# Patient Record
Sex: FEMALE | Race: BLACK OR AFRICAN AMERICAN | NOT HISPANIC OR LATINO | Employment: UNEMPLOYED | URBAN - METROPOLITAN AREA
[De-identification: names, ages, dates, MRNs, and addresses within clinical notes are randomized per-mention and may not be internally consistent; named-entity substitution may affect disease eponyms.]

---

## 2017-01-05 ENCOUNTER — CLINICAL SUPPORT (OUTPATIENT)
Dept: OTHER | Facility: CLINIC | Age: 52
End: 2017-01-05
Payer: COMMERCIAL

## 2017-01-05 VITALS
BODY MASS INDEX: 27.13 KG/M2 | WEIGHT: 179 LBS | SYSTOLIC BLOOD PRESSURE: 146 MMHG | DIASTOLIC BLOOD PRESSURE: 92 MMHG | HEIGHT: 68 IN

## 2017-01-05 DIAGNOSIS — Z00.8 HEALTH EXAMINATION IN POPULATION SURVEYS: Primary | ICD-10-CM

## 2017-01-05 LAB
GLUCOSE SERPL-MCNC: 115 MG/DL (ref 60–140)
POC CHOLESTEROL, HDL: 81 MG/DL (ref 40–?)
POC CHOLESTEROL, LDL: 98 MG/DL (ref ?–160)
POC CHOLESTEROL, TOTAL: 200 MG/DL (ref ?–240)
POC GLUCOSE FASTING: NORMAL MG/DL (ref 60–110)
POC TOTAL CHOLESTEROL / HDL RATIO: 2.5 (ref ?–6)
POC TRIGLYCERIDES: 104 MG/DL (ref ?–160)

## 2017-01-05 PROCEDURE — 82947 ASSAY GLUCOSE BLOOD QUANT: CPT | Mod: QW,S$GLB,, | Performed by: INTERNAL MEDICINE

## 2017-01-05 PROCEDURE — 80061 LIPID PANEL: CPT | Mod: QW,S$GLB,, | Performed by: INTERNAL MEDICINE

## 2017-01-05 PROCEDURE — 99401 PREV MED CNSL INDIV APPRX 15: CPT | Mod: S$GLB,,, | Performed by: INTERNAL MEDICINE

## 2017-01-05 NOTE — PROGRESS NOTES
Biometrics completed.     Results reviewed with a Registered Nurse; understanding of results and educational materials was verbalized.

## 2017-01-06 ENCOUNTER — TELEPHONE (OUTPATIENT)
Dept: OTHER | Facility: CLINIC | Age: 52
End: 2017-01-06

## 2017-01-09 ENCOUNTER — OFFICE VISIT (OUTPATIENT)
Dept: FAMILY MEDICINE | Facility: CLINIC | Age: 52
End: 2017-01-09
Payer: COMMERCIAL

## 2017-01-09 VITALS
HEART RATE: 85 BPM | SYSTOLIC BLOOD PRESSURE: 160 MMHG | BODY MASS INDEX: 27.45 KG/M2 | HEIGHT: 68 IN | OXYGEN SATURATION: 99 % | DIASTOLIC BLOOD PRESSURE: 100 MMHG | WEIGHT: 181.13 LBS | TEMPERATURE: 98 F

## 2017-01-09 DIAGNOSIS — Z12.11 COLON CANCER SCREENING: ICD-10-CM

## 2017-01-09 DIAGNOSIS — N89.8 VAGINAL DISCHARGE: ICD-10-CM

## 2017-01-09 DIAGNOSIS — Z12.4 PAP SMEAR FOR CERVICAL CANCER SCREENING: ICD-10-CM

## 2017-01-09 DIAGNOSIS — Z00.00 ANNUAL PHYSICAL EXAM: Primary | ICD-10-CM

## 2017-01-09 DIAGNOSIS — Z12.31 ENCOUNTER FOR SCREENING MAMMOGRAM FOR BREAST CANCER: ICD-10-CM

## 2017-01-09 PROCEDURE — 99999 PR PBB SHADOW E&M-EST. PATIENT-LVL IV: CPT | Mod: PBBFAC,,, | Performed by: FAMILY MEDICINE

## 2017-01-09 PROCEDURE — 88175 CYTOPATH C/V AUTO FLUID REDO: CPT

## 2017-01-09 PROCEDURE — 87480 CANDIDA DNA DIR PROBE: CPT

## 2017-01-09 PROCEDURE — 99386 PREV VISIT NEW AGE 40-64: CPT | Mod: S$GLB,,, | Performed by: FAMILY MEDICINE

## 2017-01-09 PROCEDURE — 87591 N.GONORRHOEAE DNA AMP PROB: CPT

## 2017-01-09 NOTE — MR AVS SNAPSHOT
[facility]  [address] [place] [address]  Phone: [phone]  Fax: [phone]                  Chris De La Paz   2017 2:30 PM   Office Visit    Description:  Female : 1965   Provider:  Ammy Jordan MD   Department:  [facility]           Reason for Visit     Annual Exam     Vaginal Discharge           Diagnoses this Visit        Comments    Encounter for screening mammogram for breast cancer    -  Primary     Annual physical exam         Colon cancer screening         Pap smear for cervical cancer screening         Vaginal discharge                To Do List           Future Appointments        Provider Department Dept Phone    2017 10:00 AM LAB, [place] [facility] [phone]    2017 5:45 PM [facility] MAMMO1 [facility] [phone]      Goals (5 Years of Data)     None      [facility]     [facility] Nurse Care Line -  Assistance  Registered nurses in the [facility] provide clinical advisement, health education, appointment booking, and other advisory services.  Call for this free service at [phone].             Medications           Message regarding Medications     Verify the changes and/or additions to your medication regime listed below are the same as discussed with your clinician today.  If any of these changes or additions are incorrect, please notify your healthcare provider.             Verify that the below list of medications is an accurate representation of the medications you are currently taking.  If none reported, the list may be blank. If incorrect, please contact your healthcare provider. Carry this list with you in case of emergency.                Clinical Reference Information           Vital Signs - Last Recorded  Most recent update: 2017  2:08 PM by Clovis Cedillo MA    BP Pulse Temp Ht Wt LMP    (!) 160/100 (BP Location: Left arm, Patient  "Position: Sitting, BP Method: Manual) 85 98 °F (36.7 °C) (Oral) 5' 8" (1.727 m) 82.2 kg (181 lb 1.7 oz) 09/13/2016    SpO2 BMI             99% 27.54 kg/m2         Blood Pressure          Most Recent Value    BP  (!)  160/100      Allergies as of 1/9/2017     No Known Allergies      Immunizations Administered on Date of Encounter - 1/9/2017     None      Orders Placed During Today's Visit      Normal Orders This Visit    C. trachomatis/N. gonorrhoeae by AMP DNA Cervix     Case request GI: COLONOSCOPY     Liquid-based pap smear, screening     Vaginosis Screen by DNA Probe     Future Labs/Procedures Expected by Expires    CBC auto differential  1/9/2017 3/10/2018    Comprehensive metabolic panel  1/9/2017 3/10/2018    Hemoglobin A1c  1/9/2017 3/10/2018    Lipid panel  1/9/2017 3/10/2018    Mammo Digital Screening Bilat with CAD  1/9/2017 3/12/2018    TSH  1/9/2017 3/10/2018      [facility] Sign-Up     Activating your [facility] account is as easy as 1-2-3!     1) Visit [email or web address], select Sign Up Now, enter this activation code and your date of birth, then select Next.  [email or web address]  Expires: 2/23/2017  2:41 PM      2) Create a username and password to use when you visit [facility] in the future and select a security question in case you lose your password and select Next.    3) Enter your e-mail address and click Sign Up!    Additional Information  If you have questions, please e-mail [email or web address] or call [phone] to talk to our [facility] staff. Remember, [facility] is NOT to be used for urgent needs. For medical emergencies, dial 911.         Smoking Cessation     If you would like to quit smoking:   You may be eligible for free services if you are a [place] resident and started smoking cigarettes before September 1, 1988.  Call the [employer]oking [employer]ess[employer] ([employer]) toll free at (565) 353-9197 or (859) 392-7507.   Call 5-762-QUIT-NOW if you do not meet the above criteria.            "

## 2017-01-10 ENCOUNTER — PATIENT MESSAGE (OUTPATIENT)
Dept: FAMILY MEDICINE | Facility: CLINIC | Age: 52
End: 2017-01-10

## 2017-01-10 LAB
CANDIDA RRNA VAG QL PROBE: POSITIVE
G VAGINALIS RRNA GENITAL QL PROBE: POSITIVE
T VAGINALIS RRNA GENITAL QL PROBE: NEGATIVE

## 2017-01-10 NOTE — PROGRESS NOTES
Routine Office Visit    Patient Name: Chris De La Paz    : 1965  MRN: [id number]    Subjective:  Chris is a 51 y.o. female who presents today for     1. Annual exam / establish care / new to me  2. Vaginal discharge - pt started a few days ago - pt is sexually active with same partner. No pain with sex.       Review of Systems   Constitutional: Negative for chills and fever.   HENT: Negative for congestion.    Eyes: Negative for blurred vision.   Respiratory: Negative for cough.    Cardiovascular: Negative for chest pain.   Gastrointestinal: Negative for abdominal pain, constipation, diarrhea, heartburn, nausea and vomiting.   Genitourinary: Negative for dysuria.   Musculoskeletal: Negative for myalgias.   Skin: Negative for itching and rash.   Neurological: Negative for dizziness and headaches.   Psychiatric/Behavioral: Negative for depression.       Active Problem List  There is no problem list on file for this patient.      Past Surgical History  Past Surgical History   Procedure Laterality Date    Tubal ligation         Family History  Family History   Problem Relation Age of Onset    Hypertension Mother     Hypotension Father        Social History  Social History     Social History    Marital status: Single     Spouse name: N/A    Number of children: N/A    Years of education: N/A     Occupational History    Not on file.     Social History Main Topics    Smoking status: Current Every Day Smoker     Packs/day: 0.50     Types: Cigarettes    Smokeless tobacco: Never Used    Alcohol use Yes      Comment: socially    Drug use: No    Sexual activity: Yes     Partners: Male     Birth control/ protection: None     Other Topics Concern    Not on file     Social History Narrative       Medications and Allergies  Reviewed and updated.       Physical Exam  Visit Vitals    BP (!) 160/100 (BP Location: Left arm, Patient Position: Sitting, BP Method: Manual)    Pulse 85    Temp 98 °F (36.7 °C)  "(Oral)    Ht 5' 8" (1.727 m)    Wt 82.2 kg (181 lb 1.7 oz)    LMP 09/13/2016    SpO2 99%    BMI 27.54 kg/m2     Physical Exam   Constitutional: She is oriented to person, place, and time. She appears well-developed and well-nourished.   HENT:   Head: Normocephalic and atraumatic.   Eyes: Conjunctivae and EOM are normal. Pupils are equal, round, and reactive to light.   Neck: Normal range of motion. Neck supple.   Cardiovascular: Normal rate, regular rhythm and normal heart sounds.  Exam reveals no gallop and no friction rub.    No murmur heard.  Pulmonary/Chest: Breath sounds normal. No respiratory distress. Breasts are symmetrical. There is no breast swelling.   Abdominal: Soft. Bowel sounds are normal. She exhibits no distension. There is no tenderness.   Genitourinary: Vagina normal and uterus normal. No breast tenderness, discharge or bleeding. Cervix exhibits friability. Right adnexum displays no mass. Left adnexum displays no mass.   Musculoskeletal: Normal range of motion.   Lymphadenopathy:     She has no cervical adenopathy.   Neurological: She is alert and oriented to person, place, and time.   Skin: Skin is warm.   Psychiatric: She has a normal mood and affect.         Assessment/Plan:  Chirs De La Paz is a 51 y.o. female who presents today for :    Annual physical exam  -     Lipid panel; Future; Expected date: 1/9/17  -     TSH; Future; Expected date: 1/9/17  -     CBC auto differential; Future; Expected date: 1/9/17  -     Comprehensive metabolic panel; Future; Expected date: 1/9/17  -     Hemoglobin A1c; Future; Expected date: 1/9/17    Encounter for screening mammogram for breast cancer  -     Mammo Digital Screening Bilat with CAD; Future; Expected date: 1/9/17    Colon cancer screening  -     Case request GI: COLONOSCOPY    Pap smear for cervical cancer screening  -     Liquid-based pap smear, screening    Vaginal discharge  -     Vaginosis Screen by DNA Probe  -     C. trachomatis/N. " gonorrhoeae by AMP DNA Cervix    Elevated blood pressure  Pt had elevated blood pressure. Reviewed previous blood pressures in chart - wnl   The patient is asked to make an attempt to improve diet and exercise patterns to aid in medical management of this problem.      Return in about 3 months (around 4/9/2017), or if symptoms worsen or fail to improve.

## 2017-01-11 LAB
C TRACH DNA SPEC QL NAA+PROBE: NEGATIVE
N GONORRHOEA DNA SPEC QL NAA+PROBE: NEGATIVE

## 2017-01-13 RX ORDER — METRONIDAZOLE 500 MG/1
500 TABLET ORAL EVERY 12 HOURS
Qty: 14 TABLET | Refills: 0 | Status: SHIPPED | OUTPATIENT
Start: 2017-01-13 | End: 2017-01-20

## 2017-01-13 RX ORDER — FLUCONAZOLE 150 MG/1
150 TABLET ORAL ONCE
Qty: 1 TABLET | Refills: 0 | Status: SHIPPED | OUTPATIENT
Start: 2017-01-13 | End: 2017-01-13

## 2017-01-22 ENCOUNTER — PATIENT MESSAGE (OUTPATIENT)
Dept: FAMILY MEDICINE | Facility: CLINIC | Age: 52
End: 2017-01-22

## 2017-01-22 ENCOUNTER — TELEPHONE (OUTPATIENT)
Dept: FAMILY MEDICINE | Facility: CLINIC | Age: 52
End: 2017-01-22

## 2017-01-22 NOTE — TELEPHONE ENCOUNTER
----- Message from Deandrajaneth Nevarez sent at 1/12/2017  3:59 PM CST -----  Contact: self  Pt want to know if her medication is being call into the pharmacy. She came for an ov and has some test done and stating that there is supposed to be medication send to the pharmacy for her. Please contact the pt at [phone]. Thanks!

## 2017-05-18 ENCOUNTER — PATIENT MESSAGE (OUTPATIENT)
Dept: FAMILY MEDICINE | Facility: CLINIC | Age: 52
End: 2017-05-18

## 2017-05-18 ENCOUNTER — OFFICE VISIT (OUTPATIENT)
Dept: FAMILY MEDICINE | Facility: CLINIC | Age: 52
End: 2017-05-18
Payer: COMMERCIAL

## 2017-05-18 ENCOUNTER — LAB VISIT (OUTPATIENT)
Dept: LAB | Facility: HOSPITAL | Age: 52
End: 2017-05-18
Attending: FAMILY MEDICINE
Payer: COMMERCIAL

## 2017-05-18 VITALS
HEART RATE: 110 BPM | BODY MASS INDEX: 26.12 KG/M2 | WEIGHT: 172.31 LBS | TEMPERATURE: 99 F | OXYGEN SATURATION: 97 % | HEIGHT: 68 IN | SYSTOLIC BLOOD PRESSURE: 160 MMHG | DIASTOLIC BLOOD PRESSURE: 98 MMHG

## 2017-05-18 DIAGNOSIS — M54.50 ACUTE BILATERAL LOW BACK PAIN WITHOUT SCIATICA: Primary | ICD-10-CM

## 2017-05-18 DIAGNOSIS — Z13.220 SCREENING CHOLESTEROL LEVEL: ICD-10-CM

## 2017-05-18 DIAGNOSIS — Z12.11 COLON CANCER SCREENING: ICD-10-CM

## 2017-05-18 DIAGNOSIS — R03.0 ELEVATED BLOOD PRESSURE READING WITHOUT DIAGNOSIS OF HYPERTENSION: ICD-10-CM

## 2017-05-18 DIAGNOSIS — Z23 NEED FOR TDAP VACCINATION: ICD-10-CM

## 2017-05-18 DIAGNOSIS — Z00.00 ANNUAL PHYSICAL EXAM: ICD-10-CM

## 2017-05-18 DIAGNOSIS — Z12.31 OTHER SCREENING MAMMOGRAM: ICD-10-CM

## 2017-05-18 DIAGNOSIS — Z23 NEED FOR PNEUMOCOCCAL VACCINE: ICD-10-CM

## 2017-05-18 LAB
ALBUMIN SERPL BCP-MCNC: 3.3 G/DL
ALP SERPL-CCNC: 96 U/L
ALT SERPL W/O P-5'-P-CCNC: 23 U/L
ANION GAP SERPL CALC-SCNC: 10 MMOL/L
AST SERPL-CCNC: 27 U/L
BASOPHILS # BLD AUTO: 0.01 K/UL
BASOPHILS NFR BLD: 0.1 %
BILIRUB SERPL-MCNC: 0.4 MG/DL
BUN SERPL-MCNC: 12 MG/DL
CALCIUM SERPL-MCNC: 9.7 MG/DL
CHLORIDE SERPL-SCNC: 104 MMOL/L
CHOLEST/HDLC SERPL: 2.1 {RATIO}
CO2 SERPL-SCNC: 24 MMOL/L
CREAT SERPL-MCNC: 0.9 MG/DL
DIFFERENTIAL METHOD: ABNORMAL
EOSINOPHIL # BLD AUTO: 0 K/UL
EOSINOPHIL NFR BLD: 0 %
ERYTHROCYTE [DISTWIDTH] IN BLOOD BY AUTOMATED COUNT: 20.3 %
EST. GFR  (AFRICAN AMERICAN): >60 ML/MIN/1.73 M^2
EST. GFR  (NON AFRICAN AMERICAN): >60 ML/MIN/1.73 M^2
ESTIMATED AVG GLUCOSE: 117 MG/DL
GLUCOSE SERPL-MCNC: 87 MG/DL
HBA1C MFR BLD HPLC: 5.7 %
HCT VFR BLD AUTO: 29.1 %
HDL/CHOLESTEROL RATIO: 47.7 %
HDLC SERPL-MCNC: 174 MG/DL
HDLC SERPL-MCNC: 83 MG/DL
HGB BLD-MCNC: 8.7 G/DL
LDLC SERPL CALC-MCNC: 79 MG/DL
LYMPHOCYTES # BLD AUTO: 1.3 K/UL
LYMPHOCYTES NFR BLD: 17.4 %
MCH RBC QN AUTO: 21.7 PG
MCHC RBC AUTO-ENTMCNC: 29.9 %
MCV RBC AUTO: 73 FL
MONOCYTES # BLD AUTO: 0.7 K/UL
MONOCYTES NFR BLD: 9.6 %
NEUTROPHILS # BLD AUTO: 5.6 K/UL
NEUTROPHILS NFR BLD: 72.8 %
NONHDLC SERPL-MCNC: 91 MG/DL
PLATELET # BLD AUTO: 359 K/UL
PMV BLD AUTO: 9.3 FL
POTASSIUM SERPL-SCNC: 3.8 MMOL/L
PROT SERPL-MCNC: 7.6 G/DL
RBC # BLD AUTO: 4.01 M/UL
SODIUM SERPL-SCNC: 138 MMOL/L
TRIGL SERPL-MCNC: 60 MG/DL
TSH SERPL DL<=0.005 MIU/L-ACNC: 1.15 UIU/ML
WBC # BLD AUTO: 7.69 K/UL

## 2017-05-18 PROCEDURE — 80053 COMPREHEN METABOLIC PANEL: CPT

## 2017-05-18 PROCEDURE — 85025 COMPLETE CBC W/AUTO DIFF WBC: CPT

## 2017-05-18 PROCEDURE — 83036 HEMOGLOBIN GLYCOSYLATED A1C: CPT

## 2017-05-18 PROCEDURE — 99999 PR PBB SHADOW E&M-EST. PATIENT-LVL III: CPT | Mod: PBBFAC,,, | Performed by: NURSE PRACTITIONER

## 2017-05-18 PROCEDURE — 90732 PPSV23 VACC 2 YRS+ SUBQ/IM: CPT | Mod: S$GLB,,, | Performed by: NURSE PRACTITIONER

## 2017-05-18 PROCEDURE — 36415 COLL VENOUS BLD VENIPUNCTURE: CPT | Mod: PO

## 2017-05-18 PROCEDURE — 99214 OFFICE O/P EST MOD 30 MIN: CPT | Mod: 25,S$GLB,, | Performed by: NURSE PRACTITIONER

## 2017-05-18 PROCEDURE — 84443 ASSAY THYROID STIM HORMONE: CPT

## 2017-05-18 PROCEDURE — 80061 LIPID PANEL: CPT

## 2017-05-18 PROCEDURE — 1160F RVW MEDS BY RX/DR IN RCRD: CPT | Mod: S$GLB,,, | Performed by: NURSE PRACTITIONER

## 2017-05-18 PROCEDURE — 96372 THER/PROPH/DIAG INJ SC/IM: CPT | Mod: S$GLB,,, | Performed by: NURSE PRACTITIONER

## 2017-05-18 PROCEDURE — 90471 IMMUNIZATION ADMIN: CPT | Mod: S$GLB,,, | Performed by: NURSE PRACTITIONER

## 2017-05-18 PROCEDURE — 90715 TDAP VACCINE 7 YRS/> IM: CPT | Mod: S$GLB,,, | Performed by: NURSE PRACTITIONER

## 2017-05-18 PROCEDURE — 90472 IMMUNIZATION ADMIN EACH ADD: CPT | Mod: S$GLB,,, | Performed by: NURSE PRACTITIONER

## 2017-05-18 RX ORDER — NAPROXEN 500 MG/1
500 TABLET ORAL 2 TIMES DAILY PRN
Qty: 30 TABLET | Refills: 0 | Status: SHIPPED | OUTPATIENT
Start: 2017-05-18 | End: 2017-11-05

## 2017-05-18 RX ORDER — DEXAMETHASONE SODIUM PHOSPHATE 4 MG/ML
8 INJECTION, SOLUTION INTRA-ARTICULAR; INTRALESIONAL; INTRAMUSCULAR; INTRAVENOUS; SOFT TISSUE
Status: COMPLETED | OUTPATIENT
Start: 2017-05-18 | End: 2017-05-18

## 2017-05-18 RX ORDER — METHOCARBAMOL 500 MG/1
500 TABLET, FILM COATED ORAL EVERY 12 HOURS PRN
Qty: 30 TABLET | Refills: 0 | Status: SHIPPED | OUTPATIENT
Start: 2017-05-18 | End: 2017-05-21

## 2017-05-18 RX ADMIN — DEXAMETHASONE SODIUM PHOSPHATE 8 MG: 4 INJECTION, SOLUTION INTRA-ARTICULAR; INTRALESIONAL; INTRAMUSCULAR; INTRAVENOUS; SOFT TISSUE at 08:05

## 2017-05-18 NOTE — PATIENT INSTRUCTIONS
Self-Care for Low Back Pain    Most people have low back pain now and then. In many cases, it isnt serious and self-care can help. Sometimes low back pain can be a sign of a bigger problem. Call your healthcare provider if your pain returns often or gets worse over time. For the long-term care of your back, get regular exercise, lose any excess weight and learn good posture.  Take a short rest  Lying down during the day may be beneficial for short periods of time if severe pain increases with sitting or standing. Long-term bed rest could be detrimental.  Reduce pain and swelling  Cold reduces swelling. Both cold and heat can reduce pain. Protect your skin by placing a towel between your body and the ice or heat source.  · For the first few days, apply an ice pack for 15 to 20 minutes .  · After the first few days, try heat for 15 minutes at a time to ease pain. Never sleep on a heating pad.  · Over-the-counter medicine can help control pain and swelling. Try aspirin or ibuprofen.  Exercise  Exercise can help your back heal. It also helps your back get stronger and more flexible, preventing any reinjury. Ask your healthcare provider about specific exercises for your back.  Use good posture to avoid reinjury  · When moving, bend at the hips and knees. Dont bend at the waist or twist around.  · When lifting, keep the object close to your body. Dont try to lift more than you can handle.  · When sitting, keep your lower back supported. Use a rolled-up towel as needed.  Seek immediate medical care if:  · Youre unable to stand or walk.  · You have a temperature over 100.4°F (38.0°C)  · You have frequent, painful, or bloody urination.  · You have severe abdominal pain.  · You have a sharp, stabbing pain.  · Your pain is constant.  · You have pain or numbness in your leg.  · You feel pain in a new area of your back.  · You notice that the pain isnt decreasing after more than a week.   Date Last Reviewed: 9/29/2015  ©  8307-6362 [employer]. [address] [place] [address], [place] [address]. All rights reserved. This information is not intended as a substitute for professional medical care. Always follow your healthcare professional's instructions.        Back Pain (Acute or Chronic)    Back pain is one of the most common problems. The good news is that most people feel better in 1 to 2 weeks, and most of the rest in 1 to 2 months. Most people can remain active.  People experience and describe pain differently; not everyone is the same.  · The pain can be sharp, stabbing, shooting, aching, cramping or burning.  · Movement, standing, bending, lifting, sitting, or walking may worsen pain.  · It can be localized to one spot or area, or it can be more generalized.  · It can spread or radiate upwards, to the front, or go down your arms or legs (sciatica).  · It can cause muscle spasm.  Most of the time, mechanical problems with the muscles or spine cause the pain. Mechanical problems are usually caused by an injury to the muscles or ligaments. While illness can cause back pain, it is usually not caused by a serious illness. Mechanical problems include:   · Physical activity such as sports, exercise, work, or normal activity  · Overexertion, lifting, pushing, pulling incorrectly or too aggressively  · Sudden twisting, bending, or stretching from an accident, or accidental movement  · Poor posture  · Stretching or moving wrong, without noticing pain at the time  · Poor coordination, lack of regular exercise (check with your doctor about this)  · Spinal disc disease or arthritis  · Stress  Pain can also be related to pregnancy, or illness like appendicitis, bladder or kidney infections, pelvic infections, and many other things.  Acute back pain usually gets better in 1 to 2 weeks. Back pain related to disk disease, arthritis in the spinal joints or spinal stenosis (narrowing of the spinal canal) can become chronic and last for  months or years.  Unless you had a physical injury (for example, a car accident or fall) X-rays are usually not needed for the initial evaluation of back pain. If pain continues and does not respond to medical treatment, X-rays and other tests may be needed.  Home care  Try these home care recommendations:  · When in bed, try to find a position of comfort. A firm mattress is best. Try lying flat on your back with pillows under your knees. You can also try lying on your side with your knees bent up towards your chest and a pillow between your knees.  · At first, do not try to stretch out the sore spots. If there is a strain, it is not like the good soreness you get after exercising without an injury. In this case, stretching may make it worse.  · Avoid prolong sitting, long car rides, or travel. This puts more stress on the lower back than standing or walking.  · During the first 24 to 72 hours after an acute injury or flare up of chronic back pain, apply an ice pack to the painful area for 20 minutes and then remove it for 20 minutes. Do this over a period of 60 to 90 minutes or several times a day. This will reduce swelling and pain. Wrap the ice pack in a thin towel or plastic to protect your skin.  · You can start with ice, then switch to heat. Heat (hot shower, hot bath, or heating pad) reduces pain and works well for muscle spasms. Heat can be applied to the painful area for 20 minutes then remove it for 20 minutes. Do this over a period of 60 to 90 minutes or several times a day. Do not sleep on a heating pad. It can lead to skin burns or tissue damage.  · You can alternate ice and heat therapy. Talk with your doctor about the best treatment for your back pain.  · Therapeutic massage can help relax the back muscles without stretching them.  · Be aware of safe lifting methods and do not lift anything without stretching first.  Medicines  Talk to your doctor before using medicine, especially if you have other  medical problems or are taking other medicines.  · You may use over-the-counter medicine as directed on the bottle to control pain, unless another pain medicine was prescribed. If you have chronic conditions like diabetes, liver or kidney disease, stomach ulcers, or gastrointestinal bleeding, or are taking blood thinners, talk to your doctor before taking any medicine.  · Be careful if you are given a prescription medicines, narcotics, or medicine for muscle spasms. They can cause drowsiness, affect your coordination, reflexes, and judgement. Do not drive or operate heavy machinery.  Follow-up care  Follow up with your healthcare provider, or as advised.   A radiologist will review any X-rays that were taken. Your provide will notify you of any new findings that may affect your care.  Call 911  Call emergency services if any of the following occur:  · Trouble breathing  · Confusion  · Very drowsy or trouble awakening  · Fainting or loss of consciousness  · Rapid or very slow heart rate  · Loss of bowel or bladder control  When to seek medical advice  Call your healthcare provider right away if any of these occur:   · Pain becomes worse or spreads to your legs  · Weakness or numbness in one or both legs  · Numbness in the groin or genital area  Date Last Reviewed: 7/1/2016  © 1234-3546 [employer]. [address] [place] [address], [place] [address]. All rights reserved. This information is not intended as a substitute for professional medical care. Always follow your healthcare professional's instructions.        Back Spasm (No Trauma)    Spasm of the back muscles can occur after a sudden forceful twisting or bending force (such as in a car accident), after a simple awkward movement, or after lifting something heavy with poor body positioning. In any case, muscle spasm adds to the pain. Sleeping in an awkward position or on a poor quality mattress can also cause this. Some people respond to emotional stress by  tensing the muscles of their back.  Pain that continues may need further evaluation or other types of treatment such as physical therapy.  You don't always need X-rays for the initial evaluation of back pain, unless you had a physical injury such as from a car accident or fall. If your pain continues and doesn't respond to medical treatment, X-rays and other tests may then be done.   Home care  · As soon as possible, start sitting or walking again to avoid problems from prolonged bed rest (muscle weakness, worsening back stiffness and pain, blood clots in the legs).  · When in bed, try to find a position of comfort. A firm mattress is best. Try lying flat on your back with pillows under your knees. You can also try lying on your side with your knees bent up toward your chest and a pillow between your knees.  · Avoid prolonged sitting, long car rides, or travel. This puts more stress on the lower back than standing or walking.   · During the first 24 to 72 hours after an injury or flare-up, apply an ice pack to the painful area for 20 minutes, then remove it for 20 minutes. Do this over a period of 60 to 90 minutes or several times a day. This will reduce swelling and pain. Always wrap ice packs in a thin towel.  · You can start with ice, then switch to heat. Heat (hot shower, hot bath, or heating pad) reduces pain, and works well for muscle spasms. Apply heat to the painful area for 20 minutes, then remove it for 20 minutes. Do this over a period of 60 to 90 minutes or several times a day. Do not sleep on a heating pad as it can burn or damage skin.  · Alternate ice and heat therapies.  · Be aware of safe lifting methods and do not lift anything over 15 pounds until all the pain is gone.  Gentle stretching will help your back heal faster. Do this simple routine 2 to 3 times a day until your back is feeling better.  · Lie on your back with your knees bent and both feet on the ground  · Slowly raise your left knee to  your chest as you flatten your lower back against the floor. Hold for 20 to 30 seconds.  · Relax and repeat the exercise with your right knee.  · Do 2 to 3 of these exercises for each leg.  · Repeat, hugging both knees to your chest at the same time.  · Do not bounce, but use a gentle pull.  Medicines  Talk to your doctor before using medicine, especially if you have other medical problems or are taking other medicines.  You may use acetaminophen or ibuprofen to control pain, unless your healthcare provider prescribed another pain medicine. If you have a chronic condition such as diabetes, liver or kidney disease, stomach ulcer, or gastrointestinal bleeding, or are taking blood thinners, talk with your healthcare provider before taking any medicines.  Be careful if you are given prescription pain medicine, narcotics, or medicine for muscle spasm. They can cause drowsiness, affect your coordination, reflexes, or judgment. Do not drive or operate heavy machinery when taking these medicines. Take pain medicine only as prescribed by your healthcare provider.  Follow-up care  Follow up with your doctor, or as advised. Physical therapy or further tests may be needed.  If X-rays were taken, they may be reviewed by a radiologist. You will be notified of any new findings that may affect your care.  Call 911  Seek emergency medical care if any of these occur:  · Trouble breathing  · Confusion  · Drowsiness or trouble awakening  · Fainting or loss of consciousness  · Rapid or very slow heart rate  · Loss of bowel or bladder control  When to seek medical advice  Call your healthcare provider right away if any of these occur:  · Pain becomes worse or spreads to your legs  · Weakness or numbness in one or both legs  · Numbness in the groin or genital area  · Unexplained fever over 100.4ºF (38.0ºC)  · Burning or pain when passing urine  Date Last Reviewed: 6/1/2016  © 5935-0204 [employer]. [address] [facility],  [place] [address]. All rights reserved. This information is not intended as a substitute for professional medical care. Always follow your healthcare professional's instructions.      Back Exercise to Keep Fit for Low Back Pain  To help in your recovery and to prevent further back problems, keep your back, abdominal muscles and legs strong. Walk daily as soon as you can. Gradually add other physical activities such as swimming and biking, which can help improve lower back strength. Begin as soon as you can do them comfortably. Do not do any exercises that make your pain a lot worse. The following are some back exercises that can help relieve low back pain.     Pelvic tilt   Repeat 5-10 times, twice per day.  Lie flat on your back (or stand with your back to a wall), knees bent, feet flat on the floor, body relaxed. Tighten your abdominal and buttock muscles, and tilt your pelvis. The curve of the small of your back should flatten towards the floor (or wall). Hold 10 seconds and then relax.       Knee raise     Repeat 5-10 times, twice per day.    Lie flat on your back, knees bent. Bring one knee slowly to your chest. Hug your knee gently. Then lower your leg toward the floor, keeping your knee bent. Do not straighten your legs. Repeat exercise with your other leg.              Partial press-up     Lie face down on a soft, firm surface. Do not turn your head to either side. Rest your arms bent at the elbows alongside your body. Relax for a few minutes. Then raise your upper body enough to lean on your elbows. Relax your lower back and legs as much as possible. Hold this position for 30 seconds at first. Gradually work up to five minutes. Or try slow press-ups. Hold each for five seconds, and repeat five to six times.              Copyright © 2012 by [employer]   Evangelina MACIEL, Abel LA, Gina JARRELL, Joby B, Shiva R, Layo B, Cody K, Mitchell C, Ron B, Malik S, Vasquez RHODES,  Monique CANTRELL [employer]. Adult Acute and Subacute Low Back Pain. Updated November 2012.     Back Exercises: Lower Back Rotation    To start, lie on your back with your knees bent and feet flat on the floor. Dont press your neck or lower back to the floor. Breathe deeply. You should feel comfortable and relaxed in this position.  · Drop both knees to one side. Turn your head to the other side. Keep your shoulders flat on the floor.  · Do not push through pain.  · Hold for 20 seconds.  · Slowly switch sides.  · Repeat 2 to 5 times.  Date Last Reviewed: 10/11/2015  © 7311-9185 [employer]. [address] [place] [address]. All rights reserved. This information is not intended as a substitute for professional medical care. Always follow your healthcare professional's instructions.        Back Exercises: Lower Back Stretch                        To start, sit in a chair with your feet flat on the floor. Shift your weight slightly forward. Relax, and keep your ears, shoulders, and hips aligned.  · Sit with your feet well apart.  · Bend forward and touch the floor with the backs of your hands. Relax and let your body drop.  · Hold for 20 seconds. Return to starting position.  · Repeat 2 times.   Date Last Reviewed: 8/16/2015  © 3365-2810 [employer]. [address] [place] [address]. All rights reserved. This information is not intended as a substitute for professional medical care. Always follow your healthcare professional's instructions.

## 2017-05-18 NOTE — LETTER
May 18, 2017      Chris De La Paz   [address]   [place] [address]             [facility]  [address] [facility] [facility]  [place] [address]  Phone: [phone]  Fax: [phone] Chris De La Paz    Was treated here on 05/18/2017    May Return to work/school on 05/22/2017    No Restrictions        REGINE BernardC

## 2017-05-18 NOTE — MR AVS SNAPSHOT
[facility]  [address]  [place] [address]  Phone: [phone]  Fax: [phone]                  Chris De La Paz   2017 8:00 AM   Office Visit    Description:  Female : 1965   Provider:  LISSA Connelly   Department:  [facility]           Reason for Visit     Low-back Pain     Letter for School/Work           Diagnoses this Visit        Comments    Acute bilateral low back pain without sciatica    -  Primary     Other screening mammogram         Colon cancer screening         Screening cholesterol level                To Do List           Goals (5 Years of Data)     None      Follow-Up and Disposition     Return if symptoms worsen or fail to improve.       These Medications        Disp Refills Start End    methocarbamol (ROBAXIN) 500 MG Tab 30 tablet 0 2017    Take 1 tablet (500 mg total) by mouth every 12 (twelve) hours as needed. - Oral    Pharmacy: [facility] [address] [place] [address] [address] [facility] Ph #: [phone]       naproxen (NAPROSYN) 500 MG tablet 30 tablet 0 2017     Take 1 tablet (500 mg total) by mouth 2 (two) times daily as needed. - Oral    Pharmacy: [facility] - [place] - [address] [facility] Ph #: [phone]         [facility]     [facility] On Call Nurse Care Line - 24/7 Assistance  Unless otherwise directed by your provider, please contact [facility], our nurse care line that is available for 24/7 assistance.     Registered nurses in the [facility] provide: appointment scheduling, clinical advisement, health education, and other advisory services.  Call: [phone] (toll free)               Medications           Message regarding Medications     Verify the changes and/or additions to your medication regime listed below are the same as discussed with your clinician today.  If any of these changes or additions are incorrect, please  "notify your healthcare provider.        START taking these NEW medications        Refills    methocarbamol (ROBAXIN) 500 MG Tab 0    Sig: Take 1 tablet (500 mg total) by mouth every 12 (twelve) hours as needed.    Class: Normal    Route: Oral    naproxen (NAPROSYN) 500 MG tablet 0    Sig: Take 1 tablet (500 mg total) by mouth 2 (two) times daily as needed.    Class: Normal    Route: Oral      These medications were administered today        Dose Freq    dexamethasone injection 8 mg 8 mg Clinic/HOD 1 time    Sig: Inject 2 mLs (8 mg total) into the muscle one time.    Class: Normal    Route: Intramuscular           Verify that the below list of medications is an accurate representation of the medications you are currently taking.  If none reported, the list may be blank. If incorrect, please contact your healthcare provider. Carry this list with you in case of emergency.           Current Medications     methocarbamol (ROBAXIN) 500 MG Tab Take 1 tablet (500 mg total) by mouth every 12 (twelve) hours as needed.    naproxen (NAPROSYN) 500 MG tablet Take 1 tablet (500 mg total) by mouth 2 (two) times daily as needed.           Clinical Reference Information           Your Vitals Were     BP Pulse Temp Height Weight Last Period    160/98 (BP Location: Right arm, Patient Position: Sitting, BP Method: Manual) 110 98.5 °F (36.9 °C) (Oral) 5' 8" (1.727 m) 78.1 kg (172 lb 4.6 oz) 09/13/2016    SpO2 BMI             97% 26.2 kg/m2         Blood Pressure          Most Recent Value    BP  (!)  160/98      Allergies as of 5/18/2017     No Known Allergies      Immunizations Administered on Date of Encounter - 5/18/2017     Name Date Dose VIS Date Route    Pneumococcal Polysaccharide - 23 Valent  Incomplete 0.5 mL 4/24/2015 Intramuscular      Orders Placed During Today's Visit      Normal Orders This Visit    Case request GI: COLONOSCOPY     Pneumococcal Polysaccharide Vaccine (23 Valent) (SQ/IM)     Future Labs/Procedures Expected " by Expires    Mammo Digital Screening Bilat with CAD  5/18/2017 7/16/2018      Instructions      Self-Care for Low Back Pain    Most people have low back pain now and then. In many cases, it isnt serious and self-care can help. Sometimes low back pain can be a sign of a bigger problem. Call your healthcare provider if your pain returns often or gets worse over time. For the long-term care of your back, get regular exercise, lose any excess weight and learn good posture.  Take a short rest  Lying down during the day may be beneficial for short periods of time if severe pain increases with sitting or standing. Long-term bed rest could be detrimental.  Reduce pain and swelling  Cold reduces swelling. Both cold and heat can reduce pain. Protect your skin by placing a towel between your body and the ice or heat source.  · For the first few days, apply an ice pack for 15 to 20 minutes .  · After the first few days, try heat for 15 minutes at a time to ease pain. Never sleep on a heating pad.  · Over-the-counter medicine can help control pain and swelling. Try aspirin or ibuprofen.  Exercise  Exercise can help your back heal. It also helps your back get stronger and more flexible, preventing any reinjury. Ask your healthcare provider about specific exercises for your back.  Use good posture to avoid reinjury  · When moving, bend at the hips and knees. Dont bend at the waist or twist around.  · When lifting, keep the object close to your body. Dont try to lift more than you can handle.  · When sitting, keep your lower back supported. Use a rolled-up towel as needed.  Seek immediate medical care if:  · Youre unable to stand or walk.  · You have a temperature over 100.4°F (38.0°C)  · You have frequent, painful, or bloody urination.  · You have severe abdominal pain.  · You have a sharp, stabbing pain.  · Your pain is constant.  · You have pain or numbness in your leg.  · You feel pain in a new area of your back.  · You  notice that the pain isnt decreasing after more than a week.   Date Last Reviewed: 9/29/2015  © 0170-8323 [employer]. [address] [place] [address], [place] [address]. All rights reserved. This information is not intended as a substitute for professional medical care. Always follow your healthcare professional's instructions.        Back Pain (Acute or Chronic)    Back pain is one of the most common problems. The good news is that most people feel better in 1 to 2 weeks, and most of the rest in 1 to 2 months. Most people can remain active.  People experience and describe pain differently; not everyone is the same.  · The pain can be sharp, stabbing, shooting, aching, cramping or burning.  · Movement, standing, bending, lifting, sitting, or walking may worsen pain.  · It can be localized to one spot or area, or it can be more generalized.  · It can spread or radiate upwards, to the front, or go down your arms or legs (sciatica).  · It can cause muscle spasm.  Most of the time, mechanical problems with the muscles or spine cause the pain. Mechanical problems are usually caused by an injury to the muscles or ligaments. While illness can cause back pain, it is usually not caused by a serious illness. Mechanical problems include:   · Physical activity such as sports, exercise, work, or normal activity  · Overexertion, lifting, pushing, pulling incorrectly or too aggressively  · Sudden twisting, bending, or stretching from an accident, or accidental movement  · Poor posture  · Stretching or moving wrong, without noticing pain at the time  · Poor coordination, lack of regular exercise (check with your doctor about this)  · Spinal disc disease or arthritis  · Stress  Pain can also be related to pregnancy, or illness like appendicitis, bladder or kidney infections, pelvic infections, and many other things.  Acute back pain usually gets better in 1 to 2 weeks. Back pain related to disk disease, arthritis in the  spinal joints or spinal stenosis (narrowing of the spinal canal) can become chronic and last for months or years.  Unless you had a physical injury (for example, a car accident or fall) X-rays are usually not needed for the initial evaluation of back pain. If pain continues and does not respond to medical treatment, X-rays and other tests may be needed.  Home care  Try these home care recommendations:  · When in bed, try to find a position of comfort. A firm mattress is best. Try lying flat on your back with pillows under your knees. You can also try lying on your side with your knees bent up towards your chest and a pillow between your knees.  · At first, do not try to stretch out the sore spots. If there is a strain, it is not like the good soreness you get after exercising without an injury. In this case, stretching may make it worse.  · Avoid prolong sitting, long car rides, or travel. This puts more stress on the lower back than standing or walking.  · During the first 24 to 72 hours after an acute injury or flare up of chronic back pain, apply an ice pack to the painful area for 20 minutes and then remove it for 20 minutes. Do this over a period of 60 to 90 minutes or several times a day. This will reduce swelling and pain. Wrap the ice pack in a thin towel or plastic to protect your skin.  · You can start with ice, then switch to heat. Heat (hot shower, hot bath, or heating pad) reduces pain and works well for muscle spasms. Heat can be applied to the painful area for 20 minutes then remove it for 20 minutes. Do this over a period of 60 to 90 minutes or several times a day. Do not sleep on a heating pad. It can lead to skin burns or tissue damage.  · You can alternate ice and heat therapy. Talk with your doctor about the best treatment for your back pain.  · Therapeutic massage can help relax the back muscles without stretching them.  · Be aware of safe lifting methods and do not lift anything without  stretching first.  Medicines  Talk to your doctor before using medicine, especially if you have other medical problems or are taking other medicines.  · You may use over-the-counter medicine as directed on the bottle to control pain, unless another pain medicine was prescribed. If you have chronic conditions like diabetes, liver or kidney disease, stomach ulcers, or gastrointestinal bleeding, or are taking blood thinners, talk to your doctor before taking any medicine.  · Be careful if you are given a prescription medicines, narcotics, or medicine for muscle spasms. They can cause drowsiness, affect your coordination, reflexes, and judgement. Do not drive or operate heavy machinery.  Follow-up care  Follow up with your healthcare provider, or as advised.   A radiologist will review any X-rays that were taken. Your provide will notify you of any new findings that may affect your care.  Call 911  Call emergency services if any of the following occur:  · Trouble breathing  · Confusion  · Very drowsy or trouble awakening  · Fainting or loss of consciousness  · Rapid or very slow heart rate  · Loss of bowel or bladder control  When to seek medical advice  Call your healthcare provider right away if any of these occur:   · Pain becomes worse or spreads to your legs  · Weakness or numbness in one or both legs  · Numbness in the groin or genital area  Date Last Reviewed: 7/1/2016 © 2000-2016 [employer]. [address] [place] [address], [place] [address]. All rights reserved. This information is not intended as a substitute for professional medical care. Always follow your healthcare professional's instructions.        Back Spasm (No Trauma)    Spasm of the back muscles can occur after a sudden forceful twisting or bending force (such as in a car accident), after a simple awkward movement, or after lifting something heavy with poor body positioning. In any case, muscle spasm adds to the pain. Sleeping in an awkward  position or on a poor quality mattress can also cause this. Some people respond to emotional stress by tensing the muscles of their back.  Pain that continues may need further evaluation or other types of treatment such as physical therapy.  You don't always need X-rays for the initial evaluation of back pain, unless you had a physical injury such as from a car accident or fall. If your pain continues and doesn't respond to medical treatment, X-rays and other tests may then be done.   Home care  · As soon as possible, start sitting or walking again to avoid problems from prolonged bed rest (muscle weakness, worsening back stiffness and pain, blood clots in the legs).  · When in bed, try to find a position of comfort. A firm mattress is best. Try lying flat on your back with pillows under your knees. You can also try lying on your side with your knees bent up toward your chest and a pillow between your knees.  · Avoid prolonged sitting, long car rides, or travel. This puts more stress on the lower back than standing or walking.   · During the first 24 to 72 hours after an injury or flare-up, apply an ice pack to the painful area for 20 minutes, then remove it for 20 minutes. Do this over a period of 60 to 90 minutes or several times a day. This will reduce swelling and pain. Always wrap ice packs in a thin towel.  · You can start with ice, then switch to heat. Heat (hot shower, hot bath, or heating pad) reduces pain, and works well for muscle spasms. Apply heat to the painful area for 20 minutes, then remove it for 20 minutes. Do this over a period of 60 to 90 minutes or several times a day. Do not sleep on a heating pad as it can burn or damage skin.  · Alternate ice and heat therapies.  · Be aware of safe lifting methods and do not lift anything over 15 pounds until all the pain is gone.  Gentle stretching will help your back heal faster. Do this simple routine 2 to 3 times a day until your back is feeling  better.  · Lie on your back with your knees bent and both feet on the ground  · Slowly raise your left knee to your chest as you flatten your lower back against the floor. Hold for 20 to 30 seconds.  · Relax and repeat the exercise with your right knee.  · Do 2 to 3 of these exercises for each leg.  · Repeat, hugging both knees to your chest at the same time.  · Do not bounce, but use a gentle pull.  Medicines  Talk to your doctor before using medicine, especially if you have other medical problems or are taking other medicines.  You may use acetaminophen or ibuprofen to control pain, unless your healthcare provider prescribed another pain medicine. If you have a chronic condition such as diabetes, liver or kidney disease, stomach ulcer, or gastrointestinal bleeding, or are taking blood thinners, talk with your healthcare provider before taking any medicines.  Be careful if you are given prescription pain medicine, narcotics, or medicine for muscle spasm. They can cause drowsiness, affect your coordination, reflexes, or judgment. Do not drive or operate heavy machinery when taking these medicines. Take pain medicine only as prescribed by your healthcare provider.  Follow-up care  Follow up with your doctor, or as advised. Physical therapy or further tests may be needed.  If X-rays were taken, they may be reviewed by a radiologist. You will be notified of any new findings that may affect your care.  Call 911  Seek emergency medical care if any of these occur:  · Trouble breathing  · Confusion  · Drowsiness or trouble awakening  · Fainting or loss of consciousness  · Rapid or very slow heart rate  · Loss of bowel or bladder control  When to seek medical advice  Call your healthcare provider right away if any of these occur:  · Pain becomes worse or spreads to your legs  · Weakness or numbness in one or both legs  · Numbness in the groin or genital area  · Unexplained fever over 100.4ºF (38.0ºC)  · Burning or pain when  passing urine  Date Last Reviewed: 6/1/2016 © 2000-2016 [employer]. [address] [place] [address], [place] [address]. All rights reserved. This information is not intended as a substitute for professional medical care. Always follow your healthcare professional's instructions.      Back Exercise to Keep Fit for Low Back Pain  To help in your recovery and to prevent further back problems, keep your back, abdominal muscles and legs strong. Walk daily as soon as you can. Gradually add other physical activities such as swimming and biking, which can help improve lower back strength. Begin as soon as you can do them comfortably. Do not do any exercises that make your pain a lot worse. The following are some back exercises that can help relieve low back pain.     Pelvic tilt   Repeat 5-10 times, twice per day.  Lie flat on your back (or stand with your back to a wall), knees bent, feet flat on the floor, body relaxed. Tighten your abdominal and buttock muscles, and tilt your pelvis. The curve of the small of your back should flatten towards the floor (or wall). Hold 10 seconds and then relax.       Knee raise     Repeat 5-10 times, twice per day.    Lie flat on your back, knees bent. Bring one knee slowly to your chest. Hug your knee gently. Then lower your leg toward the floor, keeping your knee bent. Do not straighten your legs. Repeat exercise with your other leg.              Partial press-up     Lie face down on a soft, firm surface. Do not turn your head to either side. Rest your arms bent at the elbows alongside your body. Relax for a few minutes. Then raise your upper body enough to lean on your elbows. Relax your lower back and legs as much as possible. Hold this position for 30 seconds at first. Gradually work up to five minutes. Or try slow press-ups. Hold each for five seconds, and repeat five to six times.              Copyright © 2012 by [employer]   Abel Jewell  D, Gina JARRELL, Joby B, Shiva R, Layo B, Cody K, Mitchell C, Ron B, Malik S, Vasquez L, Monique R. [employer]. Adult Acute and Subacute Low Back Pain. Updated November 2012.     Back Exercises: Lower Back Rotation    To start, lie on your back with your knees bent and feet flat on the floor. Dont press your neck or lower back to the floor. Breathe deeply. You should feel comfortable and relaxed in this position.  · Drop both knees to one side. Turn your head to the other side. Keep your shoulders flat on the floor.  · Do not push through pain.  · Hold for 20 seconds.  · Slowly switch sides.  · Repeat 2 to 5 times.  Date Last Reviewed: 10/11/2015  © 9669-6312 [employer]. [address] [place] [address]. All rights reserved. This information is not intended as a substitute for professional medical care. Always follow your healthcare professional's instructions.        Back Exercises: Lower Back Stretch                        To start, sit in a chair with your feet flat on the floor. Shift your weight slightly forward. Relax, and keep your ears, shoulders, and hips aligned.  · Sit with your feet well apart.  · Bend forward and touch the floor with the backs of your hands. Relax and let your body drop.  · Hold for 20 seconds. Return to starting position.  · Repeat 2 times.   Date Last Reviewed: 8/16/2015  © 5767-7105 [employer]. [address] [place] [address]. All rights reserved. This information is not intended as a substitute for professional medical care. Always follow your healthcare professional's instructions.             Smoking Cessation     If you would like to quit smoking:   You may be eligible for free services if you are a [place] resident and started smoking cigarettes before September 1, 1988.  Call the Smoking Cessation Trust (SCT) toll free at (377) 411-0824 or (737) 367-5756.   Call 9-769-QUIT-NOW if  you do not meet the above criteria.   Contact us via email: [email or web address]   View our website for more information: [email or web address]        Language Assistance Services     ATTENTION: Language assistance services are available, free of charge. Please call [phone].      ATENCIÓN: Si habstephanie hobbs, tiene a vincent disposición servicios gratuitos de asistencia lingüística. Llame al [phone].     CHÚ Ý: N?u b?n nói Ti?ng Vi?t, có các d?ch v? h? tr? ngôn ng? mi?n phí dành cho b?n. G?i s? [phone].         [facility] complies with applicable Federal civil rights laws and does not discriminate on the basis of race, color, national origin, age, disability, or sex.

## 2017-05-18 NOTE — PROGRESS NOTES
"Subjective:       Patient ID: Chris De La Paz is a 51 y.o. female.    Chief Complaint: Low-back Pain and Letter for School/Work (Need  Note)    Back Pain   This is a new problem. The current episode started in the past 7 days. The problem occurs constantly. The problem has been gradually worsening since onset. The pain is present in the lumbar spine. The pain is at a severity of 9/10. The symptoms are aggravated by bending and sitting. Associated symptoms include numbness. Pertinent negatives include no bladder incontinence, bowel incontinence or tingling. She has tried muscle relaxant for the symptoms. The treatment provided mild relief.       Past Medical History:   Diagnosis Date    Hypertension        Social History     Social History    Marital status: Single     Spouse name: N/A    Number of children: N/A    Years of education: N/A     Occupational History    Not on file.     Social History Main Topics    Smoking status: Current Every Day Smoker     Packs/day: 0.50     Types: Cigarettes    Smokeless tobacco: Never Used    Alcohol use Yes      Comment: socially    Drug use: No    Sexual activity: Yes     Partners: Male     Birth control/ protection: None     Other Topics Concern    Not on file     Social History Narrative       Past Surgical History:   Procedure Laterality Date    TUBAL LIGATION         Review of Systems   Gastrointestinal: Negative for bowel incontinence.   Genitourinary: Negative for bladder incontinence.   Musculoskeletal: Positive for back pain. Negative for gait problem and joint swelling.   Neurological: Positive for numbness. Negative for tingling.       Objective:   BP (!) 160/98 (BP Location: Right arm, Patient Position: Sitting, BP Method: Manual)  Pulse 110  Temp 98.5 °F (36.9 °C) (Oral)   Ht 5' 8" (1.727 m)  Wt 78.1 kg (172 lb 4.6 oz)  LMP 09/13/2016  SpO2 97%  BMI 26.2 kg/m2     Physical Exam   Constitutional: She is oriented to person, place, and time. She " appears well-developed and well-nourished.   HENT:   Head: Normocephalic and atraumatic.   Cardiovascular: Normal rate, regular rhythm and normal heart sounds.    Musculoskeletal:        Lumbar back: She exhibits pain and spasm. She exhibits no swelling and no edema.   Neurological: She is alert and oriented to person, place, and time.   Skin: Skin is warm, dry and intact. She is not diaphoretic. No pallor.   Psychiatric: She has a normal mood and affect. Her speech is normal and behavior is normal.       Assessment:       1. Acute bilateral low back pain without sciatica    2. Elevated blood pressure reading without diagnosis of hypertension    3. Colon cancer screening    4. Other screening mammogram    5. Screening cholesterol level    6. Need for pneumococcal vaccine    7. Need for Tdap vaccination        Plan:       Chris was seen today for low-back pain and letter for school/work.    Diagnoses and all orders for this visit:    Acute bilateral low back pain without sciatica  -     methocarbamol (ROBAXIN) 500 MG Tab; Take 1 tablet (500 mg total) by mouth every 12 (twelve) hours as needed.  -     naproxen (NAPROSYN) 500 MG tablet; Take 1 tablet (500 mg total) by mouth 2 (two) times daily as needed.  -     dexamethasone injection 8 mg; Inject 2 mLs (8 mg total) into the muscle one time.    Elevated blood pressure reading without diagnosis of hypertension  -     She will monitor her blood pressure at home. If no improvement in her blood pressure, she is to follow up in 2 weeks.    Colon cancer screening  -     Case request GI: COLONOSCOPY    Other screening mammogram  -     Mammo Digital Screening Bilat with CAD; Future    Screening cholesterol level  -     Cancel: Lipid panel; Future    Need for pneumococcal vaccine  -     Pneumococcal Polysaccharide Vaccine (23 Valent) (SQ/IM)    Need for Tdap vaccination  -     Tdap Vaccine        Return if symptoms worsen or fail to improve.

## 2017-11-05 ENCOUNTER — HOSPITAL ENCOUNTER (INPATIENT)
Facility: HOSPITAL | Age: 52
LOS: 2 days | Discharge: HOME | DRG: 066 | End: 2017-11-07
Attending: EMERGENCY MEDICINE | Admitting: EMERGENCY MEDICINE
Payer: COMMERCIAL

## 2017-11-05 DIAGNOSIS — I63.9 ACUTE CVA (CEREBROVASCULAR ACCIDENT): ICD-10-CM

## 2017-11-05 DIAGNOSIS — I63.9 CEREBROVASCULAR ACCIDENT (CVA), UNSPECIFIED MECHANISM: Primary | ICD-10-CM

## 2017-11-05 DIAGNOSIS — I10 MALIGNANT HYPERTENSION: ICD-10-CM

## 2017-11-05 DIAGNOSIS — R47.01 EXPRESSIVE APHASIA: ICD-10-CM

## 2017-11-05 DIAGNOSIS — I10 ESSENTIAL HYPERTENSION: Chronic | ICD-10-CM

## 2017-11-05 DIAGNOSIS — D64.9 ANEMIA, UNSPECIFIED TYPE: ICD-10-CM

## 2017-11-05 PROBLEM — Z72.0 TOBACCO ABUSE: Chronic | Status: ACTIVE | Noted: 2017-11-05

## 2017-11-05 PROBLEM — R47.1 DYSARTHRIA: Status: ACTIVE | Noted: 2017-11-05

## 2017-11-05 PROBLEM — D50.9 MICROCYTIC ANEMIA: Chronic | Status: ACTIVE | Noted: 2017-11-05

## 2017-11-05 LAB
ALBUMIN SERPL BCP-MCNC: 3.4 G/DL
ALP SERPL-CCNC: 94 U/L
ALT SERPL W/O P-5'-P-CCNC: 23 U/L
ANION GAP SERPL CALC-SCNC: 9 MMOL/L
ANISOCYTOSIS BLD QL SMEAR: SLIGHT
AST SERPL-CCNC: 27 U/L
BASOPHILS # BLD AUTO: 0.01 K/UL
BASOPHILS NFR BLD: 0.2 %
BILIRUB SERPL-MCNC: 0.4 MG/DL
BNP SERPL-MCNC: 54 PG/ML
BUN SERPL-MCNC: 13 MG/DL
CALCIUM SERPL-MCNC: 9.9 MG/DL
CHLORIDE SERPL-SCNC: 107 MMOL/L
CHOLEST SERPL-MCNC: 183 MG/DL
CHOLEST/HDLC SERPL: 2.4 {RATIO}
CO2 SERPL-SCNC: 23 MMOL/L
CREAT SERPL-MCNC: 1.1 MG/DL
DIFFERENTIAL METHOD: ABNORMAL
EOSINOPHIL # BLD AUTO: 0 K/UL
EOSINOPHIL NFR BLD: 0 %
ERYTHROCYTE [DISTWIDTH] IN BLOOD BY AUTOMATED COUNT: 18.7 %
EST. GFR  (AFRICAN AMERICAN): >60 ML/MIN/1.73 M^2
EST. GFR  (NON AFRICAN AMERICAN): 59 ML/MIN/1.73 M^2
GLUCOSE SERPL-MCNC: 131 MG/DL
HCT VFR BLD AUTO: 28.5 %
HDLC SERPL-MCNC: 76 MG/DL
HDLC SERPL: 41.5 %
HGB BLD-MCNC: 8.7 G/DL
HYPOCHROMIA BLD QL SMEAR: ABNORMAL
INR PPP: 0.9
LDLC SERPL CALC-MCNC: 81.8 MG/DL
LYMPHOCYTES # BLD AUTO: 1.8 K/UL
LYMPHOCYTES NFR BLD: 30.9 %
MCH RBC QN AUTO: 20.8 PG
MCHC RBC AUTO-ENTMCNC: 30.5 G/DL
MCV RBC AUTO: 68 FL
MONOCYTES # BLD AUTO: 0.6 K/UL
MONOCYTES NFR BLD: 9.9 %
NEUTROPHILS # BLD AUTO: 3.4 K/UL
NEUTROPHILS NFR BLD: 59 %
NONHDLC SERPL-MCNC: 107 MG/DL
PLATELET # BLD AUTO: 324 K/UL
PMV BLD AUTO: 9.7 FL
POCT GLUCOSE: 144 MG/DL (ref 70–110)
POTASSIUM SERPL-SCNC: 3.5 MMOL/L
PROT SERPL-MCNC: 7.9 G/DL
PROTHROMBIN TIME: 10 SEC
RBC # BLD AUTO: 4.18 M/UL
SODIUM SERPL-SCNC: 139 MMOL/L
TRIGL SERPL-MCNC: 126 MG/DL
TROPONIN I SERPL DL<=0.01 NG/ML-MCNC: 0.03 NG/ML
TSH SERPL DL<=0.005 MIU/L-ACNC: 0.98 UIU/ML
WBC # BLD AUTO: 5.83 K/UL

## 2017-11-05 PROCEDURE — 99285 EMERGENCY DEPT VISIT HI MDM: CPT | Mod: 25

## 2017-11-05 PROCEDURE — 83036 HEMOGLOBIN GLYCOSYLATED A1C: CPT

## 2017-11-05 PROCEDURE — 93005 ELECTROCARDIOGRAM TRACING: CPT

## 2017-11-05 PROCEDURE — 80053 COMPREHEN METABOLIC PANEL: CPT

## 2017-11-05 PROCEDURE — 36415 COLL VENOUS BLD VENIPUNCTURE: CPT

## 2017-11-05 PROCEDURE — 85610 PROTHROMBIN TIME: CPT

## 2017-11-05 PROCEDURE — 83880 ASSAY OF NATRIURETIC PEPTIDE: CPT

## 2017-11-05 PROCEDURE — 84484 ASSAY OF TROPONIN QUANT: CPT

## 2017-11-05 PROCEDURE — 25000003 PHARM REV CODE 250: Performed by: EMERGENCY MEDICINE

## 2017-11-05 PROCEDURE — 63600175 PHARM REV CODE 636 W HCPCS: Performed by: INTERNAL MEDICINE

## 2017-11-05 PROCEDURE — 85025 COMPLETE CBC W/AUTO DIFF WBC: CPT

## 2017-11-05 PROCEDURE — 82962 GLUCOSE BLOOD TEST: CPT

## 2017-11-05 PROCEDURE — 84443 ASSAY THYROID STIM HORMONE: CPT

## 2017-11-05 PROCEDURE — 21400001 HC TELEMETRY ROOM

## 2017-11-05 PROCEDURE — 93010 ELECTROCARDIOGRAM REPORT: CPT | Mod: ,,, | Performed by: INTERNAL MEDICINE

## 2017-11-05 PROCEDURE — A4216 STERILE WATER/SALINE, 10 ML: HCPCS | Performed by: EMERGENCY MEDICINE

## 2017-11-05 PROCEDURE — 80061 LIPID PANEL: CPT

## 2017-11-05 RX ORDER — ENOXAPARIN SODIUM 100 MG/ML
40 INJECTION SUBCUTANEOUS EVERY 24 HOURS
Status: DISCONTINUED | OUTPATIENT
Start: 2017-11-05 | End: 2017-11-07 | Stop reason: HOSPADM

## 2017-11-05 RX ORDER — ASPIRIN 325 MG
325 TABLET, DELAYED RELEASE (ENTERIC COATED) ORAL
Status: COMPLETED | OUTPATIENT
Start: 2017-11-05 | End: 2017-11-05

## 2017-11-05 RX ORDER — LABETALOL HYDROCHLORIDE 5 MG/ML
10 INJECTION, SOLUTION INTRAVENOUS EVERY 6 HOURS PRN
Status: DISCONTINUED | OUTPATIENT
Start: 2017-11-05 | End: 2017-11-07 | Stop reason: HOSPADM

## 2017-11-05 RX ORDER — ASPIRIN 325 MG
325 TABLET, DELAYED RELEASE (ENTERIC COATED) ORAL DAILY
Status: DISCONTINUED | OUTPATIENT
Start: 2017-11-05 | End: 2017-11-07 | Stop reason: HOSPADM

## 2017-11-05 RX ORDER — RAMELTEON 8 MG/1
8 TABLET ORAL NIGHTLY PRN
Status: DISCONTINUED | OUTPATIENT
Start: 2017-11-05 | End: 2017-11-07 | Stop reason: HOSPADM

## 2017-11-05 RX ORDER — ONDANSETRON 2 MG/ML
8 INJECTION INTRAMUSCULAR; INTRAVENOUS EVERY 8 HOURS PRN
Status: DISCONTINUED | OUTPATIENT
Start: 2017-11-05 | End: 2017-11-07 | Stop reason: HOSPADM

## 2017-11-05 RX ORDER — SODIUM CHLORIDE 0.9 % (FLUSH) 0.9 %
3 SYRINGE (ML) INJECTION EVERY 8 HOURS
Status: DISCONTINUED | OUTPATIENT
Start: 2017-11-05 | End: 2017-11-05

## 2017-11-05 RX ORDER — ACETAMINOPHEN 500 MG
500 TABLET ORAL EVERY 6 HOURS PRN
Status: DISCONTINUED | OUTPATIENT
Start: 2017-11-05 | End: 2017-11-07 | Stop reason: HOSPADM

## 2017-11-05 RX ORDER — ROSUVASTATIN CALCIUM 10 MG/1
20 TABLET, COATED ORAL NIGHTLY
Status: DISCONTINUED | OUTPATIENT
Start: 2017-11-05 | End: 2017-11-07 | Stop reason: HOSPADM

## 2017-11-05 RX ADMIN — ROSUVASTATIN CALCIUM 20 MG: 10 TABLET, FILM COATED ORAL at 10:11

## 2017-11-05 RX ADMIN — Medication 3 ML: at 02:11

## 2017-11-05 RX ADMIN — ASPIRIN 325 MG: 325 TABLET, DELAYED RELEASE ORAL at 11:11

## 2017-11-05 RX ADMIN — ASPIRIN 325 MG: 325 TABLET, DELAYED RELEASE ORAL at 02:11

## 2017-11-05 RX ADMIN — ENOXAPARIN SODIUM 40 MG: 100 INJECTION SUBCUTANEOUS at 10:11

## 2017-11-05 NOTE — ED PROVIDER NOTES
Encounter Date: 11/5/2017    SCRIBE #1 NOTE: I, Barbie Monet, am scribing for, and in the presence of,  Osmany Grey MD. I have scribed the following portions of the note - Other sections scribed: HPI and ROS.       History     Chief Complaint   Patient presents with    Aphasia     slurred speech and right side facial numbness started last night at 9-10 pm    Numbness     CC: Slurred Speech and Facial Numbness    HPI: This 51 y.o. Female with PMHx of HTN presents to the ED c/o slurred speech and right side facial numbness that began last night; approximately 14 hours ago. Pt notes she has difficulty pronouncing words. Pt reports hands numbness with an onset that lasted half a day approximately 2 weeks ago. Pt reports a family hx of stroke (father). Pt denies a hx of stroke. Pt also denies shortness of breath, chest pain, and headaches.       The history is provided by the patient and the spouse. No  was used.     Review of patient's allergies indicates:  No Known Allergies  Past Medical History:   Diagnosis Date    Hypertension      Past Surgical History:   Procedure Laterality Date    TUBAL LIGATION       Family History   Problem Relation Age of Onset    Hypertension Mother     Hypotension Father      Social History   Substance Use Topics    Smoking status: Current Every Day Smoker     Packs/day: 0.50     Types: Cigarettes    Smokeless tobacco: Never Used    Alcohol use Yes      Comment: socially     Review of Systems   Constitutional: Negative for chills, diaphoresis and fever.   HENT: Negative for ear pain and sore throat.    Eyes: Negative for pain.   Respiratory: Negative for cough and shortness of breath.    Cardiovascular: Negative for chest pain.   Gastrointestinal: Negative for abdominal pain, diarrhea, nausea and vomiting.   Genitourinary: Negative for dysuria.   Musculoskeletal: Negative for back pain.   Skin: Negative for rash.   Neurological: Positive for speech  difficulty (slurred speech) and numbness (right side facial). Negative for headaches.       Physical Exam     Initial Vitals [11/05/17 0949]   BP Pulse Resp Temp SpO2   (!) 215/104 101 20 98.3 °F (36.8 °C) 100 %      MAP       141         Physical Exam    Nursing note and vitals reviewed.  Constitutional: She appears well-developed and well-nourished.   Eyes: EOM are normal. Pupils are equal, round, and reactive to light.   Neck: Normal range of motion. Neck supple. No thyromegaly present. No JVD present.   Cardiovascular: Normal rate, regular rhythm, normal heart sounds and intact distal pulses. Exam reveals no gallop and no friction rub.    No murmur heard.  Pulmonary/Chest: Breath sounds normal. No respiratory distress. She has no wheezes. She has no rhonchi. She has no rales. She exhibits no tenderness.   Abdominal: Soft. Bowel sounds are normal. There is no tenderness. There is no rebound and no guarding.   Musculoskeletal: Normal range of motion. She exhibits no edema or tenderness.   Neurological: She is alert and oriented to person, place, and time.   Mild weakness to the right arm.  Patient has mild right central facial droop and slurred speech.   Skin: Skin is warm and dry. Capillary refill takes less than 2 seconds.         ED Course   Critical Care  Date/Time: 11/5/2017 12:00 PM  Performed by: JONATHAN PRATT.  Authorized by: JONATHAN PRATT   Direct patient critical care time: 15 minutes  Additional history critical care time: 5 minutes  Ordering / reviewing critical care time: 10 minutes  Documentation critical care time: 10 minutes  Consulting other physicians critical care time: 5 minutes  Other critical care time: 8 (admission) minutes  Total critical care time (exclusive of procedural time) : 53 minutes  Critical care time was exclusive of separately billable procedures and treating other patients and teaching time.  Critical care was necessary to treat or prevent imminent or  life-threatening deterioration of the following conditions: CNS failure or compromise.  Critical care was time spent personally by me on the following activities: development of treatment plan with patient or surrogate, discussions with primary provider, interpretation of cardiac output measurements, evaluation of patient's response to treatment, examination of patient, ordering and performing treatments and interventions, obtaining history from patient or surrogate, ordering and review of laboratory studies, ordering and review of radiographic studies and re-evaluation of patient's condition.        Labs Reviewed   CBC W/ AUTO DIFFERENTIAL - Abnormal; Notable for the following:        Result Value    Hemoglobin 8.7 (*)     Hematocrit 28.5 (*)     MCV 68 (*)     MCH 20.8 (*)     MCHC 30.5 (*)     RDW 18.7 (*)     All other components within normal limits   COMPREHENSIVE METABOLIC PANEL - Abnormal; Notable for the following:     Glucose 131 (*)     Albumin 3.4 (*)     eGFR if non  59 (*)     All other components within normal limits   LIPID PANEL - Abnormal; Notable for the following:     HDL 76 (*)     All other components within normal limits   TROPONIN I - Abnormal; Notable for the following:     Troponin I 0.028 (*)     All other components within normal limits   POCT GLUCOSE - Abnormal; Notable for the following:     POCT Glucose 144 (*)     All other components within normal limits   PROTIME-INR   TSH   B-TYPE NATRIURETIC PEPTIDE   POCT GLUCOSE        Patient's symptoms are concerning for stroke.  Symptoms started yesterday evening and patient is outside the window for TPA.  We'll admit for stroke.                Scribe Attestation:   Scribe #1: I performed the above scribed service and the documentation accurately describes the services I performed. I attest to the accuracy of the note.    Attending Attestation:           Physician Attestation for Scribe:  Physician Attestation Statement for  Scribe #1: I, Osmany Grey MD, reviewed documentation, as scribed by Barbie Monet in my presence, and it is both accurate and complete.                 ED Course      Clinical Impression:   The primary encounter diagnosis was Cerebrovascular accident (CVA), unspecified mechanism. Diagnoses of Anemia, unspecified type, Malignant hypertension, and Acute CVA (cerebrovascular accident) were also pertinent to this visit.                           Osmany Grey MD  11/07/17 3391

## 2017-11-05 NOTE — LETTER
November 7, 2017         [place] [id number]  Phone: [phone]       Patient: Chris De La Paz   YOB: 1965  Date of Visit: 11/07/2017    To Whom It May Concern:    Juan De La Paz  was at [facility] on 11/5/2017 to  11/07/2017. She may return to work when cleared by PCP. If you have any questions or concerns, or if I can be of further assistance, please do not hesitate to contact me.    Sincerely,    Michell Leigh MD

## 2017-11-05 NOTE — ED TRIAGE NOTES
"Arrived via personal transportation. Slurred speech that started at about 9pm last night. Numbness to rt side of face. Pt states "my arm went numb about 2 weeks ago for about 1/2 a day but it's not numb anymore". Denies headache. Denies SOB and NVD. AAox3.  at bedside  "

## 2017-11-05 NOTE — PLAN OF CARE
11/05/17 1540   Discharge Assessment   Assessment Type Discharge Planning Assessment   Confirmed/corrected address and phone number on facesheet? Yes   Assessment information obtained from? Patient   Communicated expected length of stay with patient/caregiver no   Prior to hospitilization cognitive status: Alert/Oriented   Prior to hospitalization functional status: Independent   Current cognitive status: Alert/Oriented  (Slurred speech)   Current Functional Status: Independent   Facility Arrived From: Home   Lives With alone   Able to Return to Prior Arrangements yes  (Patient is unsure of needs at this time)   Is patient able to care for self after discharge? Yes  (Patient is alone; no one to assist; possible HH or higher level of care if apporpriate)   Who are your caregiver(s) and their phone number(s)? Betsy: [phone]   Patient's perception of discharge disposition home or selfcare  (Possible HH or higher level of care depending on progress)   Readmission Within The Last 30 Days no previous admission in last 30 days   Patient currently being followed by outpatient case management? No   Patient currently receives any other outside agency services? No   Equipment Currently Used at Home none   Is the patient taking medications as prescribed? yes   Does the patient have transportation home? Yes   Transportation Available car   Does the patient receive services at the [facility]? No   Discharge Plan A Home  (Possible HH or higher level of care if appropriate)   Discharge Plan B Other  (TBD)   Patient/Family In Agreement With Plan yes   Does the patient have transportation to healthcare appointments? Yes     [facility]: [facility]SH[facility] Sandoval    Patient alone at home and independent; no one available to help out or assist at home; patient is unsure of discharge needs, depending on progress    Warning signs/symptoms reviewed with and provided to patient-blue folder. Role and responsibilities of patient  to manage healthcare at home discussed, reviewed, and repeated.

## 2017-11-06 LAB
ALBUMIN SERPL BCP-MCNC: 3.2 G/DL
ALP SERPL-CCNC: 87 U/L
ALT SERPL W/O P-5'-P-CCNC: 20 U/L
ANION GAP SERPL CALC-SCNC: 7 MMOL/L
ANISOCYTOSIS BLD QL SMEAR: SLIGHT
AST SERPL-CCNC: 25 U/L
BASOPHILS # BLD AUTO: 0.01 K/UL
BASOPHILS NFR BLD: 0.2 %
BILIRUB SERPL-MCNC: 0.3 MG/DL
BUN SERPL-MCNC: 14 MG/DL
CALCIUM SERPL-MCNC: 9.8 MG/DL
CHLORIDE SERPL-SCNC: 106 MMOL/L
CO2 SERPL-SCNC: 25 MMOL/L
CREAT SERPL-MCNC: 1.1 MG/DL
DIASTOLIC DYSFUNCTION: NO
DIFFERENTIAL METHOD: ABNORMAL
EOSINOPHIL # BLD AUTO: 0 K/UL
EOSINOPHIL NFR BLD: 0 %
ERYTHROCYTE [DISTWIDTH] IN BLOOD BY AUTOMATED COUNT: 18.4 %
EST. GFR  (AFRICAN AMERICAN): >60 ML/MIN/1.73 M^2
EST. GFR  (NON AFRICAN AMERICAN): 59 ML/MIN/1.73 M^2
ESTIMATED AVG GLUCOSE: 105 MG/DL
FERRITIN SERPL-MCNC: 13 NG/ML
FOLATE SERPL-MCNC: 9.4 NG/ML
GLOBAL PERICARDIAL EFFUSION: NORMAL
GLUCOSE SERPL-MCNC: 103 MG/DL
HBA1C MFR BLD HPLC: 5.3 %
HCT VFR BLD AUTO: 27.4 %
HGB BLD-MCNC: 8.1 G/DL
HYPOCHROMIA BLD QL SMEAR: ABNORMAL
IRON SERPL-MCNC: 28 UG/DL
LYMPHOCYTES # BLD AUTO: 1.6 K/UL
LYMPHOCYTES NFR BLD: 29.4 %
MAGNESIUM SERPL-MCNC: 1.9 MG/DL
MCH RBC QN AUTO: 20.6 PG
MCHC RBC AUTO-ENTMCNC: 29.6 G/DL
MCV RBC AUTO: 70 FL
MITRAL VALVE MOBILITY: NORMAL
MITRAL VALVE REGURGITATION: NORMAL
MONOCYTES # BLD AUTO: 0.6 K/UL
MONOCYTES NFR BLD: 11.1 %
NEUTROPHILS # BLD AUTO: 3.1 K/UL
NEUTROPHILS NFR BLD: 59.5 %
PHOSPHATE SERPL-MCNC: 3.5 MG/DL
PLATELET # BLD AUTO: 290 K/UL
PMV BLD AUTO: 9.6 FL
POCT GLUCOSE: 80 MG/DL (ref 70–110)
POTASSIUM SERPL-SCNC: 3.7 MMOL/L
PROT SERPL-MCNC: 7.5 G/DL
RBC # BLD AUTO: 3.94 M/UL
RETICS/RBC NFR AUTO: 2.2 %
RETIRED EF AND QEF - SEE NOTES: 55 (ref 55–65)
SATURATED IRON: 5 %
SODIUM SERPL-SCNC: 138 MMOL/L
TARGETS BLD QL SMEAR: ABNORMAL
TOTAL IRON BINDING CAPACITY: 568 UG/DL
TRANSFERRIN SERPL-MCNC: 384 MG/DL
TRANSFERRIN SERPL-MCNC: 384 MG/DL
TRICUSPID VALVE REGURGITATION: NORMAL
VIT B12 SERPL-MCNC: 323 PG/ML
WBC # BLD AUTO: 5.3 K/UL

## 2017-11-06 PROCEDURE — 83735 ASSAY OF MAGNESIUM: CPT

## 2017-11-06 PROCEDURE — 92523 SPEECH SOUND LANG COMPREHEN: CPT

## 2017-11-06 PROCEDURE — 82607 VITAMIN B-12: CPT

## 2017-11-06 PROCEDURE — 80053 COMPREHEN METABOLIC PANEL: CPT

## 2017-11-06 PROCEDURE — 97165 OT EVAL LOW COMPLEX 30 MIN: CPT

## 2017-11-06 PROCEDURE — 82746 ASSAY OF FOLIC ACID SERUM: CPT

## 2017-11-06 PROCEDURE — 85025 COMPLETE CBC W/AUTO DIFF WBC: CPT

## 2017-11-06 PROCEDURE — 84100 ASSAY OF PHOSPHORUS: CPT

## 2017-11-06 PROCEDURE — 82728 ASSAY OF FERRITIN: CPT

## 2017-11-06 PROCEDURE — 97110 THERAPEUTIC EXERCISES: CPT

## 2017-11-06 PROCEDURE — 25000003 PHARM REV CODE 250: Performed by: INTERNAL MEDICINE

## 2017-11-06 PROCEDURE — 92610 EVALUATE SWALLOWING FUNCTION: CPT

## 2017-11-06 PROCEDURE — G9186 MOTOR SPEECH GOAL STATUS: HCPCS | Mod: CI

## 2017-11-06 PROCEDURE — 97161 PT EVAL LOW COMPLEX 20 MIN: CPT

## 2017-11-06 PROCEDURE — 85045 AUTOMATED RETICULOCYTE COUNT: CPT

## 2017-11-06 PROCEDURE — 21400001 HC TELEMETRY ROOM

## 2017-11-06 PROCEDURE — 99222 1ST HOSP IP/OBS MODERATE 55: CPT | Mod: ,,, | Performed by: PSYCHIATRY & NEUROLOGY

## 2017-11-06 PROCEDURE — 83540 ASSAY OF IRON: CPT

## 2017-11-06 PROCEDURE — G8999 MOTOR SPEECH CURRENT STATUS: HCPCS | Mod: CJ

## 2017-11-06 PROCEDURE — 25000003 PHARM REV CODE 250: Performed by: EMERGENCY MEDICINE

## 2017-11-06 PROCEDURE — 63600175 PHARM REV CODE 636 W HCPCS: Performed by: INTERNAL MEDICINE

## 2017-11-06 PROCEDURE — 93306 TTE W/DOPPLER COMPLETE: CPT | Mod: 26,,, | Performed by: INTERNAL MEDICINE

## 2017-11-06 PROCEDURE — 93306 TTE W/DOPPLER COMPLETE: CPT

## 2017-11-06 PROCEDURE — 36415 COLL VENOUS BLD VENIPUNCTURE: CPT

## 2017-11-06 PROCEDURE — 25500020 PHARM REV CODE 255: Performed by: HOSPITALIST

## 2017-11-06 PROCEDURE — A9585 GADOBUTROL INJECTION: HCPCS | Performed by: HOSPITALIST

## 2017-11-06 RX ORDER — GADOBUTROL 604.72 MG/ML
8.5 INJECTION INTRAVENOUS
Status: COMPLETED | OUTPATIENT
Start: 2017-11-06 | End: 2017-11-06

## 2017-11-06 RX ADMIN — ASPIRIN 325 MG: 325 TABLET, DELAYED RELEASE ORAL at 08:11

## 2017-11-06 RX ADMIN — RAMELTEON 8 MG: 8 TABLET, FILM COATED ORAL at 08:11

## 2017-11-06 RX ADMIN — ROSUVASTATIN CALCIUM 20 MG: 10 TABLET, FILM COATED ORAL at 08:11

## 2017-11-06 RX ADMIN — GADOBUTROL 8.5 ML: 604.72 INJECTION INTRAVENOUS at 10:11

## 2017-11-06 RX ADMIN — ENOXAPARIN SODIUM 40 MG: 100 INJECTION SUBCUTANEOUS at 05:11

## 2017-11-06 NOTE — PLAN OF CARE
Problem: Physical Therapy Goal  Goal: Physical Therapy Goal  Patient at baseline with ambulation and has no PT needs. Patient is able to ambulate with supervision with nursing staff.

## 2017-11-06 NOTE — H&P
[facility] Hospital Medicine  History & Physical    Patient Name: Chris De La Paz  MRN: [id number]  Admission Date: 11/5/2017  Attending Physician: Teo Peguero MD   Primary Care Provider: Ammy Jordan MD         Patient information was obtained from patient.     Subjective:     Principal Problem:Expressive aphasia    Chief Complaint: Slurred speech since last night.    HPI: Ms. Chris De La Paz is a 51 y.o. female with essential hypertension, microcytic anemia, and tobacco abuse who presents to [facility] ED with complaints of aphasia last night.  She reports that the symptoms started about 9:00 PM last night and was associated with right facial numbness without drooping.  She went to sleep and her symptoms did not improve, prompting her presentation here today.  She denies any headaches, neck stiffness, nor any swallowing difficulties, but did report some occasional blurry vision.  She denies any arm or leg numbness nor weakness, and did not have any gait abnormalities.  Interestingly, she had a similar episode about 2 weeks ago that comprised of right arm weakness and numbness, right facial numbness, and speech difficulties.  She said that these symptoms lasted about half a day and resolved spontaneously.  She had some recurring symptoms that wasn't as severe.  She denies any previous strokes but does say that her father had a heart attack and perhaps a stroke.    Chart Review:  Patient has not had any recent hospitalizations within the system.    Outpatient Follow-Up  Date of Visit Physician Service   Mar 2017 Jesse Martin NP Urgent Care   Jan 2017 Ammy Jordan MD Primary Care     Past Medical History:   Diagnosis Date    Hypertension        Past Surgical History:   Procedure Laterality Date    TUBAL LIGATION         Review of patient's allergies indicates:  No Known Allergies    No current facility-administered medications on file prior to encounter.      Current Outpatient Prescriptions on  File Prior to Encounter   Medication Sig    [DISCONTINUED] naproxen (NAPROSYN) 500 MG tablet Take 1 tablet (500 mg total) by mouth 2 (two) times daily as needed.     Family History     Problem Relation (Age of Onset)    Hypertension Mother    Hypotension Father        Social History Main Topics    Smoking status: Current Every Day Smoker     Packs/day: 0.50     Types: Cigarettes    Smokeless tobacco: Never Used    Alcohol use Yes      Comment: socially    Drug use: No    Sexual activity: Yes     Partners: Male     Birth control/ protection: None     Review of Systems   Constitutional: Negative for activity change, appetite change, chills, diaphoresis, fatigue, fever and unexpected weight change.   HENT: Negative.    Eyes: Negative.    Respiratory: Negative for cough, chest tightness, shortness of breath and wheezing.    Cardiovascular: Negative for chest pain, palpitations and leg swelling.   Gastrointestinal: Negative for abdominal distention, abdominal pain, blood in stool, constipation, diarrhea, nausea and vomiting.   Genitourinary: Negative for dysuria and hematuria.   Musculoskeletal: Negative.    Skin: Negative.    Neurological: Positive for speech difficulty, weakness and numbness. Negative for dizziness, tremors, seizures, syncope, facial asymmetry, light-headedness and headaches.   Psychiatric/Behavioral: Negative.      Objective:     Vital Signs (Most Recent):  Temp: 97.6 °F (36.4 °C) (11/05/17 2008)  Pulse: 86 (11/05/17 2008)  Resp: 18 (11/05/17 2008)  BP: (!) 162/88 (11/05/17 2008)  SpO2: 97 % (11/05/17 2008) Vital Signs (24h Range):  Temp:  [97.6 °F (36.4 °C)-98.6 °F (37 °C)] 97.6 °F (36.4 °C)  Pulse:  [] 86  Resp:  [16-20] 18  SpO2:  [97 %-100 %] 97 %  BP: (162-215)/() 162/88     Weight: 74.8 kg (165 lb)  Body mass index is 25.09 kg/m².    Physical Exam   Constitutional: She is oriented to person, place, and time. She appears well-developed and well-nourished. No distress.   HENT:    Head: Normocephalic and atraumatic.   Right Ear: External ear normal.   Left Ear: External ear normal.   Nose: Nose normal.   Eyes: Right eye exhibits no discharge. Left eye exhibits no discharge.   Neck: Normal range of motion.   Cardiovascular: Normal rate, regular rhythm, normal heart sounds and intact distal pulses.  Exam reveals no gallop and no friction rub.    No murmur heard.  Pulmonary/Chest: Effort normal and breath sounds normal. No respiratory distress. She has no wheezes. She has no rales. She exhibits no tenderness.   Abdominal: Soft. Bowel sounds are normal. She exhibits no distension. There is no tenderness. There is no rebound and no guarding.   Musculoskeletal: Normal range of motion. She exhibits no edema.   Neurological: She is alert and oriented to person, place, and time. She has normal strength. No cranial nerve deficit or sensory deficit. GCS eye subscore is 4. GCS verbal subscore is 5. GCS motor subscore is 6.   Reflex Scores:       Bicep reflexes are 2+ on the right side and 2+ on the left side.       Brachioradialis reflexes are 2+ on the right side and 2+ on the left side.       Patellar reflexes are 3+ on the right side and 3+ on the left side.  5/5 strength throughout, sensation intact, no dysmetria, no pronator drift, no facial asymmetry, notable for expressive aphasia, midline tongue   Skin: Skin is warm and dry. She is not diaphoretic. No erythema.   Psychiatric: She has a normal mood and affect. Her behavior is normal. Judgment and thought content normal.   Nursing note and vitals reviewed.       Significant Labs: All pertinent labs within the past 24 hours have been reviewed.    Significant Imaging: I have reviewed and interpreted all pertinent imaging results/findings within the past 24 hours.    Assessment/Plan:     * Expressive aphasia    Her symptoms are consistent with a stroke but definitive diagnosis is pending an MRI-brain.  CT-head was negative for acute intracranial  hemorrhage, and the MRI/MRA-brain are pending.  I have reviewed the EKG and it reveals normal sinus rhythm without evidence of tachyarrhythmias.  2D-echo is pending. Patient is not within the therapeutic window for tPA. Patient is aspirin-naive, so will start aspirin; obtain a lipid panel; allow for permissive hypertension in order not to extend the penumbra; consult PT/OT and Speech Therapy for evaluation; consult  for discharge planning; and consult Neurology for further recommendations.         Essential hypertension    As addressed above.        Microcytic anemia    The patient's H/H is stable and consistent with previous laboratory measurements, and the patient exhibits no signs or symptoms of acute bleeding; there is no indication for transfusion.  Will obtain an anemia panel to help elucidate the cause of her anemia.        Tobacco abuse    Patient was counseled on smoking cessation and she will be provided a nicotine transdermal patch applied while inpatient.  Will provide additional smoking cessation counseling prior to discharge.          VTE Risk Mitigation         Ordered     enoxaparin injection 40 mg  Daily     Route:  Subcutaneous        11/05/17 2020     Medium Risk of VTE  Once      11/05/17 2020     Place sequential compression device  Until discontinued      11/05/17 2020             Total time spent on case: 45 minutes.        Torres Jerez M.D.  Staff Nocturnist  Department of Hospital Medicine  [facility]  Pager: [phone]

## 2017-11-06 NOTE — SUBJECTIVE & OBJECTIVE
Past Medical History:   Diagnosis Date    Hypertension        Past Surgical History:   Procedure Laterality Date    TUBAL LIGATION         Review of patient's allergies indicates:  No Known Allergies    No current facility-administered medications on file prior to encounter.      Current Outpatient Prescriptions on File Prior to Encounter   Medication Sig    [DISCONTINUED] naproxen (NAPROSYN) 500 MG tablet Take 1 tablet (500 mg total) by mouth 2 (two) times daily as needed.     Family History     Problem Relation (Age of Onset)    Hypertension Mother    Hypotension Father        Social History Main Topics    Smoking status: Current Every Day Smoker     Packs/day: 0.50     Types: Cigarettes    Smokeless tobacco: Never Used    Alcohol use Yes      Comment: socially    Drug use: No    Sexual activity: Yes     Partners: Male     Birth control/ protection: None     Review of Systems   Constitutional: Negative for activity change, appetite change, chills, diaphoresis, fatigue, fever and unexpected weight change.   HENT: Negative.    Eyes: Negative.    Respiratory: Negative for cough, chest tightness, shortness of breath and wheezing.    Cardiovascular: Negative for chest pain, palpitations and leg swelling.   Gastrointestinal: Negative for abdominal distention, abdominal pain, blood in stool, constipation, diarrhea, nausea and vomiting.   Genitourinary: Negative for dysuria and hematuria.   Musculoskeletal: Negative.    Skin: Negative.    Neurological: Positive for speech difficulty, weakness and numbness. Negative for dizziness, tremors, seizures, syncope, facial asymmetry, light-headedness and headaches.   Psychiatric/Behavioral: Negative.      Objective:     Vital Signs (Most Recent):  Temp: 97.6 °F (36.4 °C) (11/05/17 2008)  Pulse: 86 (11/05/17 2008)  Resp: 18 (11/05/17 2008)  BP: (!) 162/88 (11/05/17 2008)  SpO2: 97 % (11/05/17 2008) Vital Signs (24h Range):  Temp:  [97.6 °F (36.4 °C)-98.6 °F (37 °C)] 97.6 °F  (36.4 °C)  Pulse:  [] 86  Resp:  [16-20] 18  SpO2:  [97 %-100 %] 97 %  BP: (162-215)/() 162/88     Weight: 74.8 kg (165 lb)  Body mass index is 25.09 kg/m².    Physical Exam   Constitutional: She is oriented to person, place, and time. She appears well-developed and well-nourished. No distress.   HENT:   Head: Normocephalic and atraumatic.   Right Ear: External ear normal.   Left Ear: External ear normal.   Nose: Nose normal.   Eyes: Right eye exhibits no discharge. Left eye exhibits no discharge.   Neck: Normal range of motion.   Cardiovascular: Normal rate, regular rhythm, normal heart sounds and intact distal pulses.  Exam reveals no gallop and no friction rub.    No murmur heard.  Pulmonary/Chest: Effort normal and breath sounds normal. No respiratory distress. She has no wheezes. She has no rales. She exhibits no tenderness.   Abdominal: Soft. Bowel sounds are normal. She exhibits no distension. There is no tenderness. There is no rebound and no guarding.   Musculoskeletal: Normal range of motion. She exhibits no edema.   Neurological: She is alert and oriented to person, place, and time. She has normal strength. No cranial nerve deficit or sensory deficit. GCS eye subscore is 4. GCS verbal subscore is 5. GCS motor subscore is 6.   Reflex Scores:       Bicep reflexes are 2+ on the right side and 2+ on the left side.       Brachioradialis reflexes are 2+ on the right side and 2+ on the left side.       Patellar reflexes are 3+ on the right side and 3+ on the left side.  5/5 strength throughout, sensation intact, no dysmetria, no pronator drift, no facial asymmetry, notable for expressive aphasia, midline tongue   Skin: Skin is warm and dry. She is not diaphoretic. No erythema.   Psychiatric: She has a normal mood and affect. Her behavior is normal. Judgment and thought content normal.   Nursing note and vitals reviewed.       Significant Labs: All pertinent labs within the past 24 hours have been  reviewed.    Significant Imaging: I have reviewed and interpreted all pertinent imaging results/findings within the past 24 hours.

## 2017-11-06 NOTE — PLAN OF CARE
Problem: Occupational Therapy Goal  Goal: Occupational Therapy Goal  Outcome: Outcome(s) achieved Date Met: 11/06/17  OT completed evaluation.  REC: Outpatient OT and Outpatient SLP.

## 2017-11-06 NOTE — PT/OT/SLP PROGRESS
Physical Therapy      Chris De La Paz  MRN: [id number]    Patient not seen today secondary to MRI. Will follow-up again as able.    Libra Mc, SPT

## 2017-11-06 NOTE — HPI
Ms. Chris De La Paz is a 51 y.o. female with essential hypertension, microcytic anemia, and tobacco abuse who presents to [facility] ED with complaints of aphasia last night.  She reports that the symptoms started about 9:00 PM last night and was associated with right facial numbness without drooping.  She went to sleep and her symptoms did not improve, prompting her presentation here today.  She denies any headaches, neck stiffness, nor any swallowing difficulties, but did report some occasional blurry vision.  She denies any arm or leg numbness nor weakness, and did not have any gait abnormalities.  Interestingly, she had a similar episode about 2 weeks ago that comprised of right arm weakness and numbness, right facial numbness, and speech difficulties.  She said that these symptoms lasted about half a day and resolved spontaneously.  She had some recurring symptoms that wasn't as severe.  She denies any previous strokes but does say that her father had a heart attack and perhaps a stroke.

## 2017-11-06 NOTE — PT/OT/SLP EVAL
Physical Therapy  Evaluation/Discharge    Chris De La Paz   MRN: [id number]   Admitting Diagnosis: Expressive aphasia    PT Received On: 11/06/17  PT Start Time: 1534     PT Stop Time: 1550    PT Total Time (min): 16 min       Billable Minutes:  Evaluation 8 and Therapeutic Activity 8    Diagnosis: Expressive aphasia    Past Medical History:   Diagnosis Date    Hypertension       Past Surgical History:   Procedure Laterality Date    TUBAL LIGATION         Referring physician: Teo Peguero MD  Date referred to PT: 11/5/17    General Precautions: Standard, aphasia, fall  Orthopedic Precautions: N/A   Braces: N/A       Do you have any cultural, spiritual, Confucianist conflicts, given your current situation?: none    Patient History:  Lives With: spouse  Living Arrangements: house  Home Accessibility: stairs within home  Home Layout: Bedroom on 2nd floor (1 flight of stairs)  Number of Stairs to Enter Home: 0  Stair Railings at Home: inside, present on right side  Transportation Available: family or friend will provide  Living Environment Comment: bedroom on second floor but no difficulties with ambulating around sections of the house. Patient does not have family assistance during the day due to work. 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  Equipment Currently Used at Home: none  DME owned (not currently used): none    Previous Level of Function:  Ambulation Skills: independent  Transfer Skills: independent  ADL Skills: independent  Work/Leisure Activity: independent    Subjective:  Communicated with nurse Rondon prior to session.  Patient was agreeable to PT evaluation.  Chief Complaint: R sided facial numbness and some difficulty speaking.  Patient goals: To get back home ASAP.    Objective:   Patient found with: peripheral IV, telemetry     Cognitive Exam:  Oriented to: Person, Place, Time and Situation    Follows Commands/attention: Follows multistep  commands  Communication: expressive  aphasia  Safety awareness/insight to disability: intact    Physical Exam:  Postural examination/scapula alignment: Rounded shoulder and Head forward    Skin integrity: Visible skin intact  Edema: None noted    Sensation:   Impaired  R side of face (~50% decrease in sensation as compared to L)    Lower Extremity Range of Motion:  Right Lower Extremity: WNL  Left Lower Extremity: WNL    Lower Extremity Strength:  Right Lower Extremity: 4/5  Left Lower Extremity: 4-/5     Fine motor coordination:  Intact    Gross motor coordination: WFL    Functional Mobility:  Bed Mobility:  Supine to sit: Independent    Transfers:  Sit <> Stand Assistance: Independent  Sit <> Stand Assistive Device: No Assistive Device    Gait:   Gait Distance: ~500ft  Assistance 1: Supervision  Gait Assistive Device: No device  Gait Pattern: swing-through gait  Gait Deviation(s): none    Stairs:  Pt ascended/descend 9 stairs with No Assistive Device with right handrailing with Supervision.    Balance:   Static Sit: GOOD+: Takes MAXIMAL challenges from all directions.    Dynamic Sit: GOOD+: Maintains balance through MAXIMAL excursions of active trunk motion  Static Stand: GOOD: Takes MODERATE challenges from all directions  Dynamic stand: GOOD: Needs SUPERVISION only during gait and able to self right with moderate     Therapeutic Activities and Exercises:  Patient performed standing LE therex: x30 hip flex, LAQ, hip abd/add. Patient instructed on when to discontinue if onset of symptoms and how to challenge therex by discontinuing holding onto support when supervised.     AM-PAC 6 CLICK MOBILITY  How much help from another person does this patient currently need?   1 = Unable, Total/Dependent Assistance  2 = A lot, Maximum/Moderate Assistance  3 = A little, Minimum/Contact Guard/Supervision  4 = None, Modified Brady/Independent    Turning over in bed (including adjusting bedclothes, sheets and blankets)?: 4  Sitting down on and standing up  from a chair with arms (e.g., wheelchair, bedside commode, etc.): 4  Moving from lying on back to sitting on the side of the bed?: 4  Moving to and from a bed to a chair (including a wheelchair)?: 4  Need to walk in hospital room?: 4  Climbing 3-5 steps with a railing?: 4  Total Score: 24     AM-PAC Raw Score CMS G-Code Modifier Level of Impairment Assistance   6 % Total / Unable   7 - 9 CM 80 - 100% Maximal Assist   10 - 14 CL 60 - 80% Moderate Assist   15 - 19 CK 40 - 60% Moderate Assist   20 - 22 CJ 20 - 40% Minimal Assist   23 CI 1-20% SBA / CGA   24 CH 0% Independent/ Mod I     Patient left seated at EOB with all lines intact, call button in reach and nurse Nela notified.    Assessment:   Chris De La Paz is a 51 y.o. female with a medical diagnosis of Expressive aphasia and presents with decreased sensation of R face. Patient at baseline with ambulation and stairs and has no complaints of pain. Patient currently has no PT needs and is able to ambulate with supervision with nursing staff. PT to sign off.    Rehab identified problem list/impairments: Rehab identified problem list/impairments: weakness, impaired sensation.    Rehab potential is good.    Activity tolerance: Good    Discharge recommendations: Discharge Facility/Level Of Care Needs: outpatient speech therapy     Barriers to discharge: Barriers to Discharge: None    Equipment recommendations: Equipment Needed After Discharge: none     GOALS:    Physical Therapy Goals        Problem: Physical Therapy Goal    Goal Priority Disciplines Outcome Goal Variances Interventions   Physical Therapy Goal     PT/OT, PT                      PLAN:    Plan of Care reviewed with: patient    Libra Mc, SPT  11/06/2017

## 2017-11-06 NOTE — PLAN OF CARE
Problem: Patient Care Overview  Goal: Plan of Care Review   11/05/17 1800   Coping/Psychosocial   Plan Of Care Reviewed With patient       Problem: Stroke (Ischemic) (Adult)  Goal: Signs and Symptoms of Listed Potential Problems Will be Absent, Minimized or Managed (Stroke)  Signs and symptoms of listed potential problems will be absent, minimized or managed by discharge/transition of care (reference Stroke (Ischemic) (Adult) CPG).  Outcome: Ongoing (interventions implemented as appropriate)   11/05/17 1800   Stroke (Ischemic)   Problems Assessed (Stroke (Ischemic)/TIA) all   Problems Present (Stroke (Ischemic)/TIA) communication impairment       Comments: Plan of care reviewed with patient and sister at bedside. Defecits assessed and include R sided facial droop and slurred speech. Educated on fall risk and instructed to call for assistance. To be seen by PT/OT/ST tomorrow. Will have MRI/MRA and be seen by neurologist. Reports no pain. BP remains 180s/90s. No concerns at this time. Will continue to monitor.    Keya Oneill RN  11/5/2017  5:56 PM

## 2017-11-06 NOTE — PROGRESS NOTES
[facility] Hospital Medicine  Progress Note    Patient Name: Chris De La Paz  MRN: [id number]  Patient Class: IP- Inpatient   Admission Date: 11/5/2017  Length of Stay: 1 days  Attending Physician: Teo Peguero MD  Primary Care Provider: Ammy Jordan MD        Subjective:     Principal Problem:Expressive aphasia    HPI:  Ms. Chris De La Paz is a 51 y.o. female with essential hypertension, microcytic anemia, and tobacco abuse who presents to [facility] ED with complaints of aphasia last night.  She reports that the symptoms started about 9:00 PM last night and was associated with right facial numbness without drooping.  She went to sleep and her symptoms did not improve, prompting her presentation here today.  She denies any headaches, neck stiffness, nor any swallowing difficulties, but did report some occasional blurry vision.  She denies any arm or leg numbness nor weakness, and did not have any gait abnormalities.  Interestingly, she had a similar episode about 2 weeks ago that comprised of right arm weakness and numbness, right facial numbness, and speech difficulties.  She said that these symptoms lasted about half a day and resolved spontaneously.  She had some recurring symptoms that wasn't as severe.  She denies any previous strokes but does say that her father had a heart attack and perhaps a stroke.    Hospital Course:  52 y/o female presented with expressive aphasia.  MRI showing acute left posterior frontal infarct.  PT/OT/ST evaluation.  Neurology consult.    Interval History: Feeling better.    Review of Systems   HENT: Negative for ear discharge and ear pain.    Eyes: Negative for pain and itching.   Cardiovascular: Negative for chest pain and palpitations.   Gastrointestinal: Negative for abdominal distention and abdominal pain.     Objective:     Vital Signs (Most Recent):  Temp: 98.8 °F (37.1 °C) (11/06/17 1136)  Pulse: 92 (11/06/17 1136)  Resp: 18 (11/06/17 1136)  BP: (!) 156/89  (11/06/17 1136)  SpO2: 100 % (11/06/17 1136) Vital Signs (24h Range):  Temp:  [97.6 °F (36.4 °C)-98.8 °F (37.1 °C)] 98.8 °F (37.1 °C)  Pulse:  [83-98] 92  Resp:  [16-18] 18  SpO2:  [97 %-100 %] 100 %  BP: (124-186)/(69-99) 156/89     Weight: 84.7 kg (186 lb 11.7 oz)  Body mass index is 28.4 kg/m².    Intake/Output Summary (Last 24 hours) at 11/06/17 1233  Last data filed at 11/05/17 2136   Gross per 24 hour   Intake              120 ml   Output                0 ml   Net              120 ml      Physical Exam   Constitutional: She is oriented to person, place, and time. She appears well-developed and well-nourished. No distress.   HENT:   Head: Normocephalic and atraumatic.   Right Ear: External ear normal.   Left Ear: External ear normal.   Nose: Nose normal.   Eyes: Right eye exhibits no discharge. Left eye exhibits no discharge.   Neck: Normal range of motion.   Cardiovascular: Normal rate, regular rhythm, normal heart sounds and intact distal pulses.  Exam reveals no gallop and no friction rub.    No murmur heard.  Pulmonary/Chest: Effort normal and breath sounds normal. No respiratory distress. She has no wheezes. She has no rales. She exhibits no tenderness.   Abdominal: Soft. Bowel sounds are normal. She exhibits no distension. There is no tenderness. There is no rebound and no guarding.   Musculoskeletal: Normal range of motion. She exhibits no edema.   Neurological: She is alert and oriented to person, place, and time. She has normal strength. No cranial nerve deficit or sensory deficit. GCS eye subscore is 4. GCS verbal subscore is 5. GCS motor subscore is 6.   Reflex Scores:       Bicep reflexes are 2+ on the right side and 2+ on the left side.       Brachioradialis reflexes are 2+ on the right side and 2+ on the left side.       Patellar reflexes are 3+ on the right side and 3+ on the left side.  5/5 strength throughout, sensation intact, no dysmetria, no pronator drift, no facial asymmetry, notable for  expressive aphasia, midline tongue   Skin: Skin is warm and dry. She is not diaphoretic. No erythema.   Psychiatric: She has a normal mood and affect. Her behavior is normal. Judgment and thought content normal.   Nursing note and vitals reviewed.      Significant Labs:   BMP:   Recent Labs  Lab 11/06/17  0522         K 3.7      CO2 25   BUN 14   CREATININE 1.1   CALCIUM 9.8   MG 1.9     CBC:   Recent Labs  Lab 11/05/17  1010 11/06/17 0522   WBC 5.83 5.30   HGB 8.7* 8.1*   HCT 28.5* 27.4*    290       Significant Imaging: I have reviewed all pertinent imaging results/findings within the past 24 hours.    Assessment/Plan:      * Expressive aphasia    Secondary to acute, ischemic infarct involving the posterior left frontal lobe.  Appreciate Neuro input.  Permissive HTN.  ASA and Statin.  Reduce CVA risk factors.  PT/OT/ST evaluation.          Tobacco abuse    Patient was counseled on smoking cessation and she will be provided a nicotine transdermal patch applied while inpatient.  Will provide additional smoking cessation counseling prior to discharge.        Microcytic anemia    The patient's H/H is stable and consistent with previous laboratory measurements, and the patient exhibits no signs or symptoms of acute bleeding; there is no indication for transfusion.          Essential hypertension    Permissive HTN for now.          VTE Risk Mitigation         Ordered     enoxaparin injection 40 mg  Daily     Route:  Subcutaneous        11/05/17 2020     Medium Risk of VTE  Once      11/05/17 2020     Place sequential compression device  Until discontinued      11/05/17 2020              Teo Peguero MD  Department of Hospital Medicine   [facility]

## 2017-11-06 NOTE — NURSING
Patient received from ED. Admission completed. Reports no pain. Defecits including generalized weakness, slurred speecha dn R facial droop. No concerns at this time.    Keya Oneill RN  11/5/2017  2:00 PM

## 2017-11-06 NOTE — PLAN OF CARE
Problem: Stroke (Ischemic) (Adult)  Intervention: Monitor/Assist with Self Care   11/06/17 1633   Functional Level Current   Ambulation 3 - assistive equipment and person   Transferring 4 - completely dependent   Bathing 2 - assistive person   Dressing 2 - assistive person   Eating 2 - assistive person   Communication 0 - understands/communicates without difficulty   Swallowing 2 - difficulty swallowing liquids/foods   Activity   Activity Assistance Provided assistance, 2 people   Daily Care Interventions   Self-Care Promotion meal setup provided       Goal: Signs and Symptoms of Listed Potential Problems Will be Absent, Minimized or Managed (Stroke)  Signs and symptoms of listed potential problems will be absent, minimized or managed by discharge/transition of care (reference Stroke (Ischemic) (Adult) CPG).    11/06/17 1633   Stroke (Ischemic)   Problems Assessed (Stroke (Ischemic)/TIA) eating/swallowing impairment;communication impairment;muscle tone abnormal;situational response   Problems Present (Stroke (Ischemic)/TIA) bladder/bowel dysfunction;communication impairment;eating/swallowing impairment;situational response

## 2017-11-06 NOTE — ASSESSMENT & PLAN NOTE
Secondary to acute, ischemic infarct involving the posterior left frontal lobe.  Appreciate Neuro input.  Permissive HTN.  ASA and Statin.  Reduce CVA risk factors.  PT/OT/ST evaluation.

## 2017-11-06 NOTE — HOSPITAL COURSE
50 y/o female presented with expressive aphasia.  MRI showing acute left posterior frontal infarct.  PT/OT/ST evaluation.  Neurology consult.    Held off on BP medications to allow permissive HTN. Start altace on dc home 11/8/2017.  Continue on aspirin and statin. Establish with neurology and follow up PCP as scheduled. Referral to outpatient OT/PT/ST.

## 2017-11-06 NOTE — ASSESSMENT & PLAN NOTE
The patient's H/H is stable and consistent with previous laboratory measurements, and the patient exhibits no signs or symptoms of acute bleeding; there is no indication for transfusion.

## 2017-11-06 NOTE — PLAN OF CARE
Recommendations     Recommendation/Intervention:   1. Rec SLP eval: patient c/o coughing with liquids   2. Provided stroke nutrition therapy education   3. RD to monitor     Goals: Safety to po; Meet >85% EEN  Nutrition Goal Status: new  Communication of RD Recs:  (plan of care/sticky note)     Continuum of Care Plan     Referral to Outpatient Services:  (D/C planning: Cardiac Diet)

## 2017-11-06 NOTE — CONSULTS
"  [facility]  Adult Nutrition  Consult Note    SUMMARY     Recommendations    Recommendation/Intervention:   1. Rec SLP eval: patient c/o coughing with liquids   2. Provided stroke nutrition therapy education   3. RD to monitor    Goals: Safety to po; Meet >85% EEN  Nutrition Goal Status: new  Communication of RD Recs:  (plan of care/sticky note)    Continuum of Care Plan    Referral to Outpatient Services:  (D/C planning: Cardiac Diet)    Reason for Assessment    Reason for Assessment: physician consult  Diagnosis:  (Stroke)  Relevent Medical History: HTN   Interdisciplinary Rounds: did not attend     General Information Comments: Diet advanced to Cardiac. Patient reports no hx/o chewing or swallowing issues but recent issues of coughing with liquids since 11/4. Denies issues with n/v/appetite issue/weight loss or pain with eating. Tolerated breakfast (75%). Reviewed stroke nutrition therapy with patient and discussed benefits of DASH diet. Patient receptive. Provided f/u RD phone number for future issues.    Nutrition Prescription Ordered    Current Diet Order: Cardiac      Evaluation of Received Nutrients/Fluid Intake     I/O: not fully recorded at this time.   Comments: LBM: 11/4  Tolerance:  (coughing with liquids)    % Intake of Estimated Energy Needs: %   Meal Intake: 75% x 1 meal    Nutrition Risk Screen     Nutrition Risk Screen: no indicators present    Nutrition/Diet History     Food Preferences: Denies cultural, Scientologist food preferences,   Factors Affecting Nutritional Intake: difficulty/impaired swallowing (coughs with liquids)        Labs/Tests/Procedures/Meds     Pertinent Labs Reviewed: reviewed   Pertinent Medications Reviewed: reviewed     Physical Findings    Overall Physical Appearance: nourished   Oral/Mouth Cavity: WDL  Skin: intact (Tyrone 23)    Anthropometrics    Temp: 98.8 °F (37.1 °C)     Height: 5' 7" (170.2 cm)  Weight Method: Bed Scale  Weight: 84.7 kg (186 lb " 11.7 oz)     Ideal Body Weight (IBW), Female: 135 lb     % Ideal Body Weight, Female (lb): 138.32 lb  BMI (Calculated): 29.3  BMI Grade: 25 - 29.9 - overweight     Estimated/Assessed Needs    Weight Used For Calorie Calculations: 84.7 kg (186 lb 11.7 oz)      Energy Calorie Requirements (kcal): 0569-5080 kcal  Energy Need Method: [place]-St Jeor     RMR ([place]-St. Jeor Equation): 1499.62      Weight Used For Protein Calculations: 84.7 kg (186 lb 11.7 oz)  Protein Requirements: 65 g     Fluid Need Method: RDA Method      RDA Method (mL): 1800       CHO Requirement: n/a     Assessment and Plan    Nutrition dx: Difficulty swallowing r/t s/p stroke as evidenced by: patient reports coughing with liquids  Nutrition Dx status: new    Monitor and Evaluation    Food and Nutrient Intake: energy intake, food and beverage intake  Food and Nutrient Adminstration: diet order  Knowledge/Beliefs/Attitudes: food and nutrition knowledge/skill  Physical Activity and Function: nutrition-related ADLs and IADLs  Anthropometric Measurements: weight, weight change  Biochemical Data, Medical Tests and Procedures: electrolyte and renal panel  Nutrition-Focused Physical Findings: overall appearance    Nutrition Risk    Level of Risk:  (1 x week)    Nutrition Follow-Up    RD Follow-up?: Yes

## 2017-11-06 NOTE — CONSULTS
[facility] [place]  [place]ult Note    Patient Name: Chris De La Paz  MRN: [id number]  Admission Date: 11/5/2017  Hospital Length of Stay: 1 days  Code Status: Full Code   Attending Provider: Teo Peguero MD   Consulting Provider: Yaakov Perez MD  Primary Care Physician: Ammy Jordan MD  Principal Problem:Expressive aphasia    Inpatient consult to Neurology  Consult performed by: YAAKOV PEREZ  Consult ordered by: JOSELIN CARY        Subjective:     Chief Complaint:  Slurred speed; numbness on right side of face     HPI: 50 y/o female with medical Hx as listed below comes to ED after experiencing sudden onset of slurred speech and numbness on ht right side of face. Symptoms began the night before; pt went to sleep and upon waking up and no resolution of symptoms decided to come to ED today. Denies loss of vision, vertigo, weakness or numbness of extremities. Does reports similar episode but with right arm weakness around two weeks prior; symptoms resolved completely then. Pt denies Hx of stroke but her head CT has two areas of possible old infarction.    Past Medical History:   Diagnosis Date    Hypertension        Past Surgical History:   Procedure Laterality Date    TUBAL LIGATION         Review of patient's allergies indicates:  No Known Allergies    Current Neurological Medications:     No current facility-administered medications on file prior to encounter.      No current outpatient prescriptions on file prior to encounter.      Family History     Problem Relation (Age of Onset)    Hypertension Mother    Hypotension Father        Social History Main Topics    Smoking status: Current Every Day Smoker     Packs/day: 0.50     Types: Cigarettes    Smokeless tobacco: Never Used    Alcohol use Yes      Comment: socially    Drug use: No    Sexual activity: Yes     Partners: Male     Birth control/ protection: None     Review of Systems   Constitutional: Negative for fever and unexpected  weight change.   HENT: Negative for trouble swallowing.    Eyes: Negative for photophobia and visual disturbance.   Respiratory: Negative for chest tightness and shortness of breath.    Cardiovascular: Negative for chest pain and palpitations.   Gastrointestinal: Negative for abdominal pain.   Genitourinary: Negative for dysuria.   Musculoskeletal: Negative for arthralgias, back pain and gait problem.   Neurological: Positive for facial asymmetry, speech difficulty and numbness. Negative for dizziness, tremors, seizures, syncope, weakness, light-headedness and headaches.   Psychiatric/Behavioral: Negative for confusion and hallucinations.          Objective:     Vital Signs (Most Recent):  Temp: 98.4 °F (36.9 °C) (11/06/17 0718)  Pulse: 84 (11/06/17 0718)  Resp: 18 (11/06/17 0718)  BP: 139/75 (11/06/17 0718)  SpO2: 99 % (11/06/17 0718) Vital Signs (24h Range):  Temp:  [97.6 °F (36.4 °C)-98.6 °F (37 °C)] 98.4 °F (36.9 °C)  Pulse:  [83-98] 84  Resp:  [16-18] 18  SpO2:  [97 %-100 %] 99 %  BP: (124-186)/(69-99) 139/75     Weight: 84.7 kg (186 lb 11.7 oz)  Body mass index is 28.4 kg/m².    Physical Exam   Constitutional: She is oriented to person, place, and time. She appears well-developed and well-nourished.   Eyes: Conjunctivae and EOM are normal. Pupils are equal, round, and reactive to light. Right eye exhibits no discharge. Left eye exhibits no discharge. No scleral icterus.   Neck: Normal range of motion. Neck supple.   Cardiovascular: Normal rate and regular rhythm.    Pulmonary/Chest: Effort normal and breath sounds normal.   Abdominal: Bowel sounds are normal. She exhibits no distension.   Musculoskeletal: Normal range of motion.   Neurological: She is oriented to person, place, and time. She has normal strength.       NEUROLOGICAL EXAMINATION:     MENTAL STATUS   Oriented to person, place, and time.   Level of consciousness: alert       Partial expressive aphasia     CRANIAL NERVES     CN III, IV, VI   Pupils  are equal, round, and reactive to light.  Extraocular motions are normal.   Right pupil: Size: 2 mm.   Left pupil: Size: 2 mm.     CN VII   Right facial weakness: central  Left facial weakness: none    CN IX, X   CN IX normal.   CN X normal.     CN XI   CN XI normal.     CN XII   CN XII normal.     MOTOR EXAM   Muscle bulk: normal  Overall muscle tone: normal  Right arm tone: normal  Left arm tone: normal  Right leg tone: normal  Left leg tone: normal    Strength   Strength 5/5 throughout.     SENSORY EXAM   Right arm light touch: normal  Left arm light touch: normal  Right arm vibration: normal  Left arm vibration: normal      Significant Labs:   CBC:   Recent Labs  Lab 11/05/17  1010 11/06/17  0522   WBC 5.83 5.30   HGB 8.7* 8.1*   HCT 28.5* 27.4*    290     CMP:   Recent Labs  Lab 11/05/17  1010 11/06/17  0522   * 103    138   K 3.5 3.7    106   CO2 23 25   BUN 13 14   CREATININE 1.1 1.1   CALCIUM 9.9 9.8   MG  --  1.9   PROT 7.9 7.5   ALBUMIN 3.4* 3.2*   BILITOT 0.4 0.3   ALKPHOS 94 87   AST 27 25   ALT 23 20   ANIONGAP 9 7*   EGFRNONAA 59* 59*     LIPIDS/LFTS:    Recent Labs  Lab 01/05/17  1339 05/18/17  0837 11/05/17  1010   POC CHOLESTEROL, TOTAL 200  --   --    CHOLESTEROL  --  174 183   TRIGLYCERIDES  --  60 126   HDL  --  83 H 76 H   LDL CHOLESTEROL  --  79.0 81.8   NON-HDL CHOLESTEROL  --  91 107       Significant Imaging:  Head CT  Findings: There is remote encephalomalacia of the right parietal occipital temporal junction. No acute bleed, mass, or mass effect seen. No skull lesion or skull fracture seen.   Impression      No acute process seen.      Electronically signed by: AMPARO JO MD  Date: 11/05/17  Time: 10:14          Assessment and Plan:     50 y/o female with stroke    1. Stroke: likely located in left hemisphere. Pt seem to have other areas of infarction; etiology not clear at this point. She does have uncontrolled HTN and is a smoker.   -MRI/MRA brain and  neck   -Permissive HTN for now   -Echo   -ASA and statin   -Smoking cessation   -ST    Active Diagnoses:    Diagnosis Date Noted POA    PRINCIPAL PROBLEM:  Expressive aphasia [R47.01] 11/05/2017 Yes    Essential hypertension [I10] 11/05/2017 Yes     Chronic    Microcytic anemia [D50.9] 11/05/2017 Yes     Chronic    Tobacco abuse [Z72.0] 11/05/2017 Yes     Chronic      Problems Resolved During this Admission:    Diagnosis Date Noted Date Resolved POA       VTE Risk Mitigation         Ordered     enoxaparin injection 40 mg  Daily     Route:  Subcutaneous        11/05/17 2020     Medium Risk of VTE  Once      11/05/17 2020     Place sequential compression device  Until discontinued      11/05/17 2020          Thank you for your consult. I will follow-up with patient. Please contact us if you have any additional questions.    Yaakov Dorantes MD  Neurology  [facility]

## 2017-11-06 NOTE — SUBJECTIVE & OBJECTIVE
Interval History: Feeling better.    Review of Systems   HENT: Negative for ear discharge and ear pain.    Eyes: Negative for pain and itching.   Cardiovascular: Negative for chest pain and palpitations.   Gastrointestinal: Negative for abdominal distention and abdominal pain.     Objective:     Vital Signs (Most Recent):  Temp: 98.8 °F (37.1 °C) (11/06/17 1136)  Pulse: 92 (11/06/17 1136)  Resp: 18 (11/06/17 1136)  BP: (!) 156/89 (11/06/17 1136)  SpO2: 100 % (11/06/17 1136) Vital Signs (24h Range):  Temp:  [97.6 °F (36.4 °C)-98.8 °F (37.1 °C)] 98.8 °F (37.1 °C)  Pulse:  [83-98] 92  Resp:  [16-18] 18  SpO2:  [97 %-100 %] 100 %  BP: (124-186)/(69-99) 156/89     Weight: 84.7 kg (186 lb 11.7 oz)  Body mass index is 28.4 kg/m².    Intake/Output Summary (Last 24 hours) at 11/06/17 1233  Last data filed at 11/05/17 2136   Gross per 24 hour   Intake              120 ml   Output                0 ml   Net              120 ml      Physical Exam   Constitutional: She is oriented to person, place, and time. She appears well-developed and well-nourished. No distress.   HENT:   Head: Normocephalic and atraumatic.   Right Ear: External ear normal.   Left Ear: External ear normal.   Nose: Nose normal.   Eyes: Right eye exhibits no discharge. Left eye exhibits no discharge.   Neck: Normal range of motion.   Cardiovascular: Normal rate, regular rhythm, normal heart sounds and intact distal pulses.  Exam reveals no gallop and no friction rub.    No murmur heard.  Pulmonary/Chest: Effort normal and breath sounds normal. No respiratory distress. She has no wheezes. She has no rales. She exhibits no tenderness.   Abdominal: Soft. Bowel sounds are normal. She exhibits no distension. There is no tenderness. There is no rebound and no guarding.   Musculoskeletal: Normal range of motion. She exhibits no edema.   Neurological: She is alert and oriented to person, place, and time. She has normal strength. No cranial nerve deficit or sensory  deficit. GCS eye subscore is 4. GCS verbal subscore is 5. GCS motor subscore is 6.   Reflex Scores:       Bicep reflexes are 2+ on the right side and 2+ on the left side.       Brachioradialis reflexes are 2+ on the right side and 2+ on the left side.       Patellar reflexes are 3+ on the right side and 3+ on the left side.  5/5 strength throughout, sensation intact, no dysmetria, no pronator drift, no facial asymmetry, notable for expressive aphasia, midline tongue   Skin: Skin is warm and dry. She is not diaphoretic. No erythema.   Psychiatric: She has a normal mood and affect. Her behavior is normal. Judgment and thought content normal.   Nursing note and vitals reviewed.      Significant Labs:   BMP:   Recent Labs  Lab 11/06/17  0522         K 3.7      CO2 25   BUN 14   CREATININE 1.1   CALCIUM 9.8   MG 1.9     CBC:   Recent Labs  Lab 11/05/17  1010 11/06/17  0522   WBC 5.83 5.30   HGB 8.7* 8.1*   HCT 28.5* 27.4*    290       Significant Imaging: I have reviewed all pertinent imaging results/findings within the past 24 hours.

## 2017-11-06 NOTE — PLAN OF CARE
Problem: Stroke (Ischemic) (Adult)  Intervention: Monitor/Assist with Self Care   11/05/17 1940 11/06/17 0030 11/06/17 0744   Functional Level Current   Ambulation --  --  0 - independent   Transferring --  --  0 - independent   Toileting --  --  0 - independent   Bathing --  --  0 - independent   Dressing --  --  0 - independent   Eating --  --  0 - independent   Communication --  --  0 - understands/communicates without difficulty   Swallowing --  --  0 - swallows foods/liquids without difficulty   Daily Care Interventions   Self-Care Promotion independence encouraged;BADL personal objects within reach --  --    Activity   Activity Assistance Provided --  assistance, stand-by --      Intervention: Provide Oxygenation/Ventilation/Perfusion Support   11/06/17 0744   Activity   Activity Type bedrest with bathroom privileges   Positioning   Head of Bed (HOB) HOB elevated   Respiratory Interventions   Airway/Ventilation Management airway patency maintained     Intervention: Promote Effective Elimination   11/06/17 0744   Monitor/Manage Chemotherapy Gastrointestinal Effects   Bowel Dysfunction Management relaxation techniques promoted   Genitourinary () Interventions   Urinary Elimination Promotion absorbent pad/diaper use encouraged;frequent voiding encouraged     Intervention: Support Psychosocial Response to Stroke   11/05/17 1940   Coping/Psychosocial Interventions   Supportive Measures active listening utilized;counseling provided;goal setting facilitated;positive reinforcement provided;self-care encouraged;verbalization of feelings encouraged     Intervention: Manage Hypertension/Promote Hemodynamic Stability   11/05/17 1940   Safety Interventions   Bleeding Precautions blood pressure closely monitored;coagulation study results reviewed;monitored for signs of bleeding     Intervention: Protect/Optimize Cerebral Perfusion   11/05/17 1940   Neurological Interventions   Cerebral Edema Prevention/Management blood  pressure monitored     Intervention: Protect Affected Joints/Extremities   11/06/17 0744   Positioning   Body Position positioned/repositioned independently     Intervention: Prevent/Minimize Sheer/Friction Injuries   11/06/17 0744   Skin Interventions   Pressure Reduction Devices pressure-redistributing mattress utilized   Pressure Reduction Techniques frequent weight shift encouraged     Intervention: Prevent/Manage DVT/VTE Risk   11/05/17 1940 11/06/17 0724   Minimize Embolism Risk   VTE Prevention/Management ambulation promoted;bleeding precautions maintained;bleeding risk assessed;bleeding risk factor(s) identified, provider notified;dorsiflexion/plantar flexion performed;ROM (active) performed;ROM (passive) performed --    OTHER   VTE Required Core Measure --  Pharmacological prophylaxis initiated/maintained     Intervention: Promote Effective Communication   11/05/17 1940   Cognitive Interventions   Communication Enhancement Strategies extra time allowed for response;one-step directions provided;verbal communication attempts encouraged     Intervention: Promote/Optimize Sensory/Motor Ability   11/05/17 1940   Cognitive Interventions   Sensory Stimulation Regulation care clustered;lighting decreased       Goal: Signs and Symptoms of Listed Potential Problems Will be Absent, Minimized or Managed (Stroke)  Signs and symptoms of listed potential problems will be absent, minimized or managed by discharge/transition of care (reference Stroke (Ischemic) (Adult) CPG).    11/06/17 0744   Stroke (Ischemic)   Problems Assessed (Stroke (Ischemic)/TIA) all   Problems Present (Stroke (Ischemic)/TIA) communication impairment

## 2017-11-06 NOTE — PROGRESS NOTES
Note that patient taken for MRI; MRI (akhil) was called early this am to determine if patient could eat and take meds; Med given and patient ate breakfast; Patient denies complaints at this time; Will continue to f/u;

## 2017-11-06 NOTE — ASSESSMENT & PLAN NOTE
The patient's H/H is stable and consistent with previous laboratory measurements, and the patient exhibits no signs or symptoms of acute bleeding; there is no indication for transfusion.  Will obtain an anemia panel to help elucidate the cause of her anemia.

## 2017-11-06 NOTE — PT/OT/SLP EVAL
"Speech Language Pathology Evaluation  Bedside Swallow Study and Speech Language Eval    Chris De La Paz   MRN: [id number]   Admitting Diagnosis: Expressive aphasia    Diet recommendations: Solid Diet Level: Regular  Liquid Diet Level: Thin HOB to 90 degrees    SLP Treatment Date: 11/06/17  Speech Start Time: 1500     Speech Stop Time: 1550     Speech Total (min): 50 min       TREATMENT BILLABLE MINUTES:  Eval 40  and Treatment Swallowing Dysfunction 10    Diagnosis: Expressive aphasia  CVA    Past Medical History:   Diagnosis Date    Hypertension      Past Surgical History:   Procedure Laterality Date    TUBAL LIGATION         Has the patient been evaluated by SLP for swallowing? : No  Keep patient NPO?: No   General Precautions: Standard, aphasia          Social Hx: Patient independent for ADLs    Prior diet: unrestricted    Occupational/hobbies/homemaking: - she reports she "loves her job"    Subjective:  Pt became teary when asking about her prognosis for resolve of dysarthria.   Patient goals: articulation to baseline.     Pain/Comfort  Pain Rating 1: 0/10    Objective:   Patient found with: peripheral IV Pt self repositioned upright in bed for oral mech (see below) and trials of thin liquids and solids. Multiple self presented trials of thin liquids via straw across ignacio. 4 oz revealed adequate oral prep and no overt s/s of aspiration. Solid trial revealed decreased coordination during oral prep; Pt reporting she was biting her cheek and tongue as well as apraxia of swallow. Increased oral prep time with significant delay of swallow initiation. Pt reporting sensation of bolus in oral cavity is different than prior to CVA which negatively impacts her swallow initiation. She was able to initiate A-P transport on command; no overt s/s of aspiration.     Oral Musculature Evaluation  Oral Musculature: right weakness (mild right numbness/weakness)  Dentition: present and adequate  Mandibular " Strength and Mobility: WFL  Oral Labial Strength and Mobility: other (see comments), functional seal, functional pursing (right weakness)  Lingual Strength and Mobility: WFL  Velar Elevation: WFL  Buccal Strength and Mobility: WFL     Informal means were used to assess Pt's cognitive linguistic skills, results as follows:  Attention to tasks and conversation was wfl. Speech was ignacio. 85% intelligible in unknown context; intelligibility miryam to 99% when Pt was cued to utilize speech intelligibility strategies. Frequent dysfluencies during multiple syllabic words at level of conversation were noted errors characterized by interchanging syllable order, Pt was grossly able to self correct during episodes of dysfluency.   Immediate recall, long term recall, short term recall were wfl.   Pt answered simple and complex y/n questions with 100% acc. Pt performed 1,2,3 step commands without difficulty. Automatic speech acts were performed wfl. Expressive language at level of conversation revealed periodic episodes of anomia. Responsive naming task revealed delayed response 1 of 5 trials. Pt named average of 17 concrete category members give one minute where 15 is considered wfl however this is possibly below her baseline level of function as perseveration on previous targets and delays were noted. Convergent naming and problem solving skills were wfl.     ST POC and ST diagnoses were discussed with Pt who reports intended compliance. She asked when she would be seen next.     FIM:  Social Interaction: 7 Complete [place]--The patient interacts appropriately with staff, other patients, and family members (e.g., controls temper, accepts criticism, is aware that words and actions have an impact on others), and does not require medication for               Assessment:  Chris De La Paz is a 51 y.o. female with diagnosis of CVA she presents with mild apraxia of speech and swallow and mild-moderate dysarthria. Pt's prognosis is  excellent as she is motivated and compliant and was able to demonstrate use of intelligibility strategies after minimal instruction.         Discharge recommendations: Discharge Facility/Level Of Care Needs: outpatient speech therapy     Goals:    SLP Goals        Problem: SLP Goal    Goal Priority Disciplines Outcome   SLP Goal    Medium SLP    Description:  STGS  1. Pt will tolerate regular with thin liquids without overt s/s of aspiration.   2. Pt will utilize speech intelligibility strategies (I) at level of conversation.   3. Pt will utilize speech fluency strategies (I) at level of conversation.   4. Pt will utilize word finding strategies (I) at the level of conversation.  5. Pt will perform complex divergent naming tasks with min assist.                      Plan:   Patient to be seen Therapy Frequency: 5 x/week, 4 x/week   Plan of Care expires: 11/06/17  Plan of Care reviewed with:  Pt   SLP Follow-up?: Yes         SLP G-Codes  Functional Assessment Tool Used: FIMS  Score: 5  Functional Limitations: Motor speech  Motor Speech Current Status (): [place]  Motor Speech Goal Status (): LOPEZ Nance CCC-SLP  11/06/2017

## 2017-11-06 NOTE — ASSESSMENT & PLAN NOTE
Her symptoms are consistent with a stroke but definitive diagnosis is pending an MRI-brain.  CT-head was negative for acute intracranial hemorrhage, and the MRI/MRA-brain are pending.  I have reviewed the EKG and it reveals normal sinus rhythm without evidence of tachyarrhythmias.  2D-echo is pending. Patient is not within the therapeutic window for tPA. Patient is aspirin-naive, so will start aspirin; obtain a lipid panel; allow for permissive hypertension in order not to extend the penumbra; consult PT/OT and Speech Therapy for evaluation; consult  for discharge planning; and consult Neurology for further recommendations.

## 2017-11-06 NOTE — PT/OT/SLP EVAL
Occupational Therapy  Evaluation/Discharge    Chris De La Paz   MRN: [id number]   Admitting Diagnosis: Expressive aphasia    OT Date of Treatment: 11/06/17   OT Start Time: 1310  OT Stop Time: 1325  OT Total Time (min): 15 min    Billable Minutes:  Evaluation 15    Diagnosis: Expressive aphasia       Past Medical History:   Diagnosis Date    Hypertension       Past Surgical History:   Procedure Laterality Date    TUBAL LIGATION         Referring physician: Sudhir  Date referred to OT: 11.5.17    General Precautions: Standard, aphasia  Orthopedic Precautions: N/A  Braces:            Patient History:  Living Environment  Lives With: child(beryl), dependent (Pt lives with 18 yr old son that is graduating in May and he does not drive)  Living Arrangements: apartment  Home Accessibility: stairs to enter home  Number of Stairs to Enter Home: 14  Transportation Available: car  Equipment Currently Used at Home: none    Prior level of function:   Bed Mobility/Transfers: independent  Grooming: independent  Bathing: independent  Upper Body Dressing: independent  Lower Body Dressing: independent  Toileting: independent  Home Management Skills: independent  Homemaking Responsibilities: Yes  Driving License: Yes  Mode of Transportation: Car  Occupation: Full time employment (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    Chief Complaint: Speech  Patient/Family stated goals: to return to work and work on speech    Pain/Comfort  Pain Rating 1: 0/10  Pain Rating Post-Intervention 2: 0/10    Objective:  Patient found with: peripheral IV    Cognitive Exam:  Oriented to: Person, Place, Time and Situation  Follows Commands/attention: Follows two-step commands  Communication: expressive aphasia  Memory:  No Deficits noted  Safety awareness/insight to disability: intact  Coping skills/emotional control: Appropriate to  situation    Visual/perceptual:  Impaired  Pt states that she has times when it feel like her eyes are covered    Physical Exam:  Postural examination/scapula alignment: No postural abnormalities identified  Skin integrity: Visible skin intact  Edema: None noted     Sensation:   Intact    Upper Extremity Range of Motion:  Right Upper Extremity: WFL  Left Upper Extremity: WFL    Upper Extremity Strength:  Right Upper Extremity: WFL  Left Upper Extremity: WFL   Strength: fair    Fine motor coordination:   Impaired  decreased coordination noted in right hand    Gross motor coordination: WFL    Functional Mobility:  Bed Mobility:  Rolling/Turning to Left: Modified independent  Rolling/Turning Right: Modified independent  Scooting/Bridging: Modified Independent  Supine to Sit: Modified Independent  Sit to Supine: Modified Independent    Transfers:  Sit <> Stand Assistance: Modified Independent  Sit <> Stand Assistive Device: No Assistive Device  Bed <> Chair Technique: Stand Pivot  Bed <> Chair Transfer Assistance: Modified Independent    Functional Ambulation: Pt able to ambulate in room without any problems with no assistance device independently    Activities of Daily Living:  UE Dressing Level of Assistance: Modified independent  LE Dressing Level of Assistance: Modified independent    Balance:   Static Sit: GOOD: Takes MODERATE challenges from all directions  Dynamic Sit: GOOD+: Maintains balance through MAXIMAL excursions of active trunk motion  Static Stand: NORMAL: No deviations seen in posture held statically  Dynamic stand: NORMAL: No deviations seen in posture held dynamically      AM-PAC 6 CLICK ADL  How much help from another person does this patient currently need?  1 = Unable, Total/Dependent Assistance  2 = A lot, Maximum/Moderate Assistance  3 = A little, Minimum/Contact Guard/Supervision  4 = None, Modified Powell/Independent    Putting on and taking off regular lower body clothing? :  "3  Bathing (including washing, rinsing, drying)?: 3  Toileting, which includes using toilet, bedpan, or urinal? : 4  Putting on and taking off regular upper body clothing?: 4  Taking care of personal grooming such as brushing teeth?: 4  Eating meals?: 4  Total Score: 22    AM-PAC Raw Score CMS "G-Code Modifier Level of Impairment Assistance   6 % Total / Unable   7 - 9 CM 80 - 100% Maximal Assist   10-14 CL 60 - 80% Moderate Assist   15 - 19 CK 40 - 60% Moderate Assist   20 - 22 CJ 20 - 40% Minimal Assist   23 CI 1-20% SBA / CGA   24 CH 0% Independent/ Mod I       Patient left supine with all lines intact and call button in reach    Assessment:  Chris De La Paz is a 51 y.o. female with a medical diagnosis of Expressive aphasia and presents with dysarthria. Pt has slight limitation with coordination in right upper extremty. Pt would benefit from outpatient OT to focus on higher level activities of daily living.     Rehab identified problem list/impairments: Rehab identified problem list/impairments: decreased coordination, impaired coordination, impaired fine motor (impaired speech)    Rehab potential is good.    Activity tolerance: Good    Discharge recommendations: Discharge Facility/Level Of Care Needs: outpatient OT, outpatient speech therapy     Barriers to discharge: Barriers to Discharge: None    Equipment recommendations: none     GOALS:    Occupational Therapy Goals     Not on file          Multidisciplinary Problems (Resolved)        Problem: Occupational Therapy Goal    Goal Priority Disciplines Outcome Interventions   Occupational Therapy Goal   (Resolved)     OT, PT/OT Outcome(s) achieved                    PLAN:  Pt would benefit from Outpatient OT and Outpatient SLP    Plan of Care reviewed with: patient         Onelia Perrin OT  11/06/2017  "

## 2017-11-07 VITALS
OXYGEN SATURATION: 100 % | SYSTOLIC BLOOD PRESSURE: 145 MMHG | TEMPERATURE: 99 F | RESPIRATION RATE: 18 BRPM | BODY MASS INDEX: 29.13 KG/M2 | HEIGHT: 67 IN | DIASTOLIC BLOOD PRESSURE: 84 MMHG | WEIGHT: 185.63 LBS | HEART RATE: 101 BPM

## 2017-11-07 LAB — POCT GLUCOSE: 97 MG/DL (ref 70–110)

## 2017-11-07 PROCEDURE — 25000003 PHARM REV CODE 250: Performed by: EMERGENCY MEDICINE

## 2017-11-07 PROCEDURE — 99232 SBSQ HOSP IP/OBS MODERATE 35: CPT | Mod: ,,, | Performed by: PSYCHIATRY & NEUROLOGY

## 2017-11-07 PROCEDURE — 92507 TX SP LANG VOICE COMM INDIV: CPT

## 2017-11-07 PROCEDURE — 90471 IMMUNIZATION ADMIN: CPT | Performed by: HOSPITALIST

## 2017-11-07 PROCEDURE — G8999 MOTOR SPEECH CURRENT STATUS: HCPCS | Mod: CI

## 2017-11-07 PROCEDURE — 81241 F5 GENE: CPT

## 2017-11-07 PROCEDURE — G9186 MOTOR SPEECH GOAL STATUS: HCPCS | Mod: CI

## 2017-11-07 PROCEDURE — 25000003 PHARM REV CODE 250: Performed by: PSYCHIATRY & NEUROLOGY

## 2017-11-07 PROCEDURE — 86147 CARDIOLIPIN ANTIBODY EA IG: CPT

## 2017-11-07 PROCEDURE — S4991 NICOTINE PATCH NONLEGEND: HCPCS | Performed by: PSYCHIATRY & NEUROLOGY

## 2017-11-07 PROCEDURE — 90686 IIV4 VACC NO PRSV 0.5 ML IM: CPT | Performed by: HOSPITALIST

## 2017-11-07 PROCEDURE — 63600175 PHARM REV CODE 636 W HCPCS: Performed by: HOSPITALIST

## 2017-11-07 PROCEDURE — 36415 COLL VENOUS BLD VENIPUNCTURE: CPT

## 2017-11-07 RX ORDER — IBUPROFEN 200 MG
1 TABLET ORAL DAILY
Refills: 0 | COMMUNITY
Start: 2017-11-08 | End: 2018-01-28

## 2017-11-07 RX ORDER — ASPIRIN 325 MG
325 TABLET, DELAYED RELEASE (ENTERIC COATED) ORAL DAILY
Refills: 0 | COMMUNITY
Start: 2017-11-08 | End: 2018-01-28

## 2017-11-07 RX ORDER — RAMIPRIL 5 MG/1
5 CAPSULE ORAL DAILY
Qty: 90 CAPSULE | Refills: 3 | Status: SHIPPED | OUTPATIENT
Start: 2017-11-08 | End: 2018-01-18

## 2017-11-07 RX ORDER — IBUPROFEN 200 MG
1 TABLET ORAL DAILY
Status: DISCONTINUED | OUTPATIENT
Start: 2017-11-07 | End: 2017-11-07 | Stop reason: HOSPADM

## 2017-11-07 RX ORDER — ROSUVASTATIN CALCIUM 20 MG/1
20 TABLET, COATED ORAL NIGHTLY
Qty: 90 TABLET | Refills: 3 | Status: SHIPPED | OUTPATIENT
Start: 2017-11-07 | End: 2018-11-07

## 2017-11-07 RX ADMIN — NICOTINE 1 PATCH: 14 PATCH, EXTENDED RELEASE TRANSDERMAL at 09:11

## 2017-11-07 RX ADMIN — INFLUENZA A VIRUS A/MICHIGAN/45/2015 X-275 (H1N1) ANTIGEN (FORMALDEHYDE INACTIVATED), INFLUENZA A VIRUS A/HONG KONG/4801/2014 X-263B (H3N2) ANTIGEN (FORMALDEHYDE INACTIVATED), INFLUENZA B VIRUS B/PHUKET/3073/2013 ANTIGEN (FORMALDEHYDE INACTIVATED), AND INFLUENZA B VIRUS B/BRISBANE/60/2008 ANTIGEN (FORMALDEHYDE INACTIVATED) 0.5 ML: 15; 15; 15; 15 INJECTION, SUSPENSION INTRAMUSCULAR at 03:11

## 2017-11-07 RX ADMIN — ASPIRIN 325 MG: 325 TABLET, DELAYED RELEASE ORAL at 09:11

## 2017-11-07 NOTE — PLAN OF CARE
Problem: Patient Care Overview  Goal: Plan of Care Review  Outcome: Ongoing (interventions implemented as appropriate)   17 1515   Coping/Psychosocial   Plan Of Care Reviewed With patient       Problem: Fall Risk (Adult)  Intervention: Monitor/Assist with Self Care   17 1503   Activity   Activity Assistance Provided assistance, stand-by     Intervention: Reduce Risk/Promote Restraint Free Environment   17 1515   Safety Interventions   Environmental Safety Modification assistive device/personal items within reach;clutter free environment maintained   Prevent [place] Drop/Fall   Safety/Security Measures bed alarm set     Intervention: Review Medications/Identify Contributors to Fall Risk   17 1515   Safety Interventions   Medication Review/Management medications reviewed     Intervention: Patient Rounds   17 1503   Safety Interventions   Patient Rounds bed in low position;bed wheels locked;clutter free environment maintained;call light in reach;placement of personal items at bedside;ID band on;toileting offered;visualized patient     Intervention: Safety Promotion/Fall Prevention   17 1503   Safety Interventions   Safety Promotion/Fall Prevention bed alarm set;Fall Risk reviewed with patient/family;lighting adjusted;medications reviewed;nonskid shoes/socks when out of bed;room near unit station;side rails raised x 3;instructed to call staff for mobility       Goal: Identify Related Risk Factors and Signs and Symptoms  Related risk factors and signs and symptoms are identified upon initiation of Human Response Clinical Practice Guideline (CPG)   Outcome: Ongoing (interventions implemented as appropriate)   17 1800   Fall Risk   Related Risk Factors (Fall Risk) gait/mobility problems;neuro disease/injury   Signs and Symptoms (Fall Risk) presence of risk factors     Goal: Absence of Falls  Patient will demonstrate the desired outcomes by discharge/transition of care.   Outcome:  Ongoing (interventions implemented as appropriate)   11/07/17 0218   Fall Risk (Adult)   Absence of Falls making progress toward outcome

## 2017-11-07 NOTE — NURSING
Discharge instructions given to patient and spouse at bedside. Patient verbalized understanding of instructions. Patient states willingness to comply.

## 2017-11-07 NOTE — NURSING
Report received from EVELYN Bazan. Patient resting comfortably, no complaints, no acute distress noted. Plan of care reviewed with patient. Instructed patient to call for assistance before ambulating, side rails up x3, bed alarm set, call light in reach, non skid socks in use. Patient verbalized understanding of instructions.     Patient requests Nicotine patch for smoking cravings, MD Dorantes notified, new orders given and acknowledged.

## 2017-11-07 NOTE — NURSING
In preparation for discharge, d/c'ed patient's tele monitor, NSR prior to removal, d/c'ed patient's saline lock, applied pressure to site, secured site with tape and gauze.

## 2017-11-07 NOTE — DISCHARGE SUMMARY
[facility] Hospital Medicine  Discharge Summary      Patient Name: Chris De La Paz  MRN: [id number]  Admission Date: 11/5/2017  Hospital Length of Stay: 2 days  Discharge Date and Time:  11/07/2017 2:32 PM  Attending Physician: Michell Leigh MD   Discharging Provider: Michell Leigh MD  Primary Care Provider: Ammy Jordan MD      HPI:   Ms. Chris De La Paz is a 51 y.o. female with essential hypertension, microcytic anemia, and tobacco abuse who presents to [facility] ED with complaints of aphasia last night.  She reports that the symptoms started about 9:00 PM last night and was associated with right facial numbness without drooping.  She went to sleep and her symptoms did not improve, prompting her presentation here today.  She denies any headaches, neck stiffness, nor any swallowing difficulties, but did report some occasional blurry vision.  She denies any arm or leg numbness nor weakness, and did not have any gait abnormalities.  Interestingly, she had a similar episode about 2 weeks ago that comprised of right arm weakness and numbness, right facial numbness, and speech difficulties.  She said that these symptoms lasted about half a day and resolved spontaneously.  She had some recurring symptoms that wasn't as severe.  She denies any previous strokes but does say that her father had a heart attack and perhaps a stroke.    * No surgery found *      Hospital Course:   52 y/o female presented with expressive aphasia.  MRI showing acute left posterior frontal infarct.  PT/OT/ST evaluation.  Neurology consult.    Held off on BP medications to allow permissive HTN. Start altace on dc home 11/8/2017.  Continue on aspirin and statin. Establish with neurology and follow up PCP as scheduled. Referral to outpatient OT/PT/ST.      Consults:   Consults         Status Ordering Provider     Inpatient consult to Neurology  Once     Provider:  Yaakov Dorantes MD    Completed ROSEANNA HILL consult to  case management/social work  Once     Provider:  (Not yet assigned)    Acknowledged JONATHAN PRATT     IP consult to dietary  Once     Provider:  (Not yet assigned)    Completed JONATHAN PRATT          No new Assessment & Plan notes have been filed under this hospital service since the last note was generated.  Service: Hospital Medicine    Final Active Diagnoses:    Diagnosis Date Noted POA    PRINCIPAL PROBLEM:  Expressive aphasia [R47.01] 11/05/2017 Yes    Essential hypertension [I10] 11/05/2017 Yes     Chronic    Microcytic anemia [D50.9] 11/05/2017 Yes     Chronic    Tobacco abuse [Z72.0] 11/05/2017 Yes     Chronic      Problems Resolved During this Admission:    Diagnosis Date Noted Date Resolved POA       Discharged Condition: good    Disposition: Home or Self Care    Follow Up:  Follow-up Information     Ammy Jordan MD In 1 week.    Specialty:  Family Medicine  Contact information:  [address] [facility] [id number] [phone]             Anupam Hoffman MD In 3 weeks.    Specialty:  Neurology  Contact information:  [address]  [facility] [address] [phone]                 Patient Instructions:     Ambulatory Referral to Physical/Occupational Therapy   Referral Priority: Routine Referral Type: Physical Medicine   Referral Reason: Specialty Services Required    Number of Visits Requested: 1      Ambulatory Referral to Speech Therapy   Referral Priority: Routine Referral Type: Speech Therapy   Referral Reason: Specialty Services Required    Requested Specialty: Speech Pathology    Number of Visits Requested: 1      Diet Cardiac     Activity as tolerated     Call MD for:  persistent dizziness, light-headedness, or visual disturbances     Call MD for:  severe persistent headache           Pending Diagnostic Studies:     Procedure Component Value Units Date/Time    Cardiolipin antibody [[id number]] Collected:  11/07/17 1203    Order Status:  Sent Lab Status:  In process  Updated:  11/07/17 1204    Specimen:  Blood from Blood     Factor 5 leiden [[id number]] Collected:  11/07/17 1203    Order Status:  Sent Lab Status:  In process Updated:  11/07/17 1215    Specimen:  Blood from Blood          Medications:  Reconciled Home Medications:   Current Discharge Medication List      START taking these medications    Details   aspirin (ECOTRIN) 325 MG EC tablet Take 1 tablet (325 mg total) by mouth once daily.  Refills: 0      nicotine (NICODERM CQ) 14 mg/24 hr Place 1 patch onto the skin once daily.  Refills: 0      ramipril (ALTACE) 5 MG capsule Take 1 capsule (5 mg total) by mouth once daily.  Qty: 90 capsule, Refills: 3      rosuvastatin (CRESTOR) 20 MG tablet Take 1 tablet (20 mg total) by mouth every evening.  Qty: 90 tablet, Refills: 3         STOP taking these medications       GUAIFEN/PHENYLEPH/ACETAMINOPHN (THERAFLU SEVERE COLD AND COUGH ORAL) Comments:   Reason for Stopping:               Indwelling Lines/Drains at time of discharge:   Lines/Drains/Airways          No matching active lines, drains, or airways          Time spent on the discharge of patient: 40 minutes  Patient was seen and examined on the date of discharge and determined to be suitable for discharge.         Michell Leigh MD  Department of Hospital Medicine  [facility]

## 2017-11-07 NOTE — PLAN OF CARE
Problem: Patient Care Overview  Goal: Plan of Care Review  Outcome: Ongoing (interventions implemented as appropriate)   11/07/17 0218   Coping/Psychosocial   Plan Of Care Reviewed With patient       Problem: Stroke (Ischemic) (Adult)  Goal: Signs and Symptoms of Listed Potential Problems Will be Absent, Minimized or Managed (Stroke)  Signs and symptoms of listed potential problems will be absent, minimized or managed by discharge/transition of care (reference Stroke (Ischemic) (Adult) CPG).   Outcome: Ongoing (interventions implemented as appropriate)   11/07/17 0218   Stroke (Ischemic)   Problems Assessed (Stroke (Ischemic)/TIA) all   Problems Present (Stroke (Ischemic)/TIA) none       Problem: Fall Risk (Adult)  Goal: Absence of Falls  Patient will demonstrate the desired outcomes by discharge/transition of care.   Outcome: Ongoing (interventions implemented as appropriate)   11/07/17 0218   Fall Risk (Adult)   Absence of Falls making progress toward outcome

## 2017-11-07 NOTE — PLAN OF CARE
Problem: SLP Goal  Goal: SLP Goal  STGS  1. Pt will tolerate regular with thin liquids without overt s/s of aspiration.   2. Pt will utilize speech intelligibility strategies (I) at level of conversation.   3. Pt will utilize speech fluency strategies (I) at level of conversation.   4. Pt will utilize word finding strategies (I) at the level of conversation.  5. Pt will perform complex divergent naming tasks with min assist.    Outcome: Ongoing (interventions implemented as appropriate)  Pt progressing; specifically improved dysarthria

## 2017-11-07 NOTE — PLAN OF CARE
"   11/07/17 1524   Final Note   Assessment Type Final Discharge Note   Discharge Disposition Home   What phone number can be called within the next 1-3 days to see how you are doing after discharge? ([phone])   Hospital Follow Up  Appt(s) scheduled? Yes   Discharge plans and expectations educations in teach back method with documentation complete? Yes   Right Care Referral Info   Post Acute Recommendation Other   Referral Type Out Patient Speech and OT   Facility Name [facility]   EDUCATION:  Patient provided with educational information on stroke/Tia.  Information reviewed and placed in :My Healthcare Packet" to be brought home for patient to use as resource after discharge.  Information included:  signs and symptoms to look for and call the doctor if experiencing, and symptoms that may indicate a medical emergency: CALL 911.    Reminded patient things she will be responsible for to manage her healthcare at home: getting Rx filled, attending follow up appointments, and taking medication as prescribed were discussed.   Teach back method used.  All questions answered.  Patient verbalized understanding of all information.      NurseJose A notified that all CM needs are met      "

## 2017-11-07 NOTE — PROGRESS NOTES
[facility]  Neurology  Progress Note    Patient Name: Chris De La Paz  MRN: [id number]  Admission Date: 11/5/2017  Hospital Length of Stay: 2 days  Code Status: Full Code   Attending Provider: Michell Leigh MD  Primary Care Physician: Ammy Jordan MD   Principal Problem:Expressive aphasia    Subjective:     Interval History: 52 y/o female with medical Hx as listed below comes to ED after experiencing sudden onset of slurred speech and numbness on ht right side of face. Symptoms began the night before; pt went to sleep and upon waking up and no resolution of symptoms decided to come to ED today. Denies loss of vision, vertigo, weakness or numbness of extremities. Does reports similar episode but with right arm weakness around two weeks prior; symptoms resolved completely then. Pt denies Hx of stroke but her head CT has two areas of possible old infarction.    -11/7/17: No new issues. Pt denies weakness, numbness. Still with difficulties getting her words out.    Current Neurological Medications:     Current Facility-Administered Medications   Medication Dose Route Frequency Provider Last Rate Last Dose    acetaminophen tablet 500 mg  500 mg Oral Q6H PRN Torres Jerez MD        aspirin EC tablet 325 mg  325 mg Oral Daily Osmany Grey MD   325 mg at 11/06/17 0822    enoxaparin injection 40 mg  40 mg Subcutaneous Daily Torres Jerez MD   40 mg at 11/06/17 1732    influenza (FLUZONE,FLUARIX QUADRIVALENT) vaccine 0.5 mL  0.5 mL Intramuscular Prior to discharge Teo Peguero MD        labetalol injection 10 mg  10 mg Intravenous Q6H PRN Osmany Grey MD        nicotine 14 mg/24 hr 1 patch  1 patch Transdermal Daily Yaakov Dorantes MD        ondansetron injection 8 mg  8 mg Intravenous Q8H PRN Torres Jerez MD        promethazine (PHENERGAN) 12.5 mg in dextrose 5 % 50 mL IVPB  12.5 mg Intravenous Q6H PRN Torres Jerez MD        ramelteon tablet 8 mg  8 mg Oral Nightly PRN Torres MACIEL  MD Solitario   8 mg at 11/06/17 2054    rosuvastatin tablet 20 mg  20 mg Oral QHS Osmany Grey MD   20 mg at 11/06/17 2054       Review of Systems   Constitutional: Negative for fever and unexpected weight change.   HENT: Negative for trouble swallowing.    Eyes: Negative for photophobia and visual disturbance.   Respiratory: Negative for chest tightness and shortness of breath.    Cardiovascular: Negative for chest pain and palpitations.   Gastrointestinal: Negative for abdominal pain.   Genitourinary: Negative for dysuria.   Musculoskeletal: Negative for arthralgias, back pain and gait problem.   Neurological: Positive for facial asymmetry, speech difficulty and numbness. Negative for dizziness, tremors, seizures, syncope, weakness, light-headedness and headaches.   Psychiatric/Behavioral: Negative for confusion and hallucinations.     Objective:     Vital Signs (Most Recent):  Temp: 98.8 °F (37.1 °C) (11/07/17 0756)  Pulse: 70 (11/07/17 0756)  Resp: 18 (11/07/17 0756)  BP: (!) 140/83 (11/07/17 0756)  SpO2: 96 % (11/07/17 0756) Vital Signs (24h Range):  Temp:  [98.1 °F (36.7 °C)-98.8 °F (37.1 °C)] 98.8 °F (37.1 °C)  Pulse:  [] 70  Resp:  [18] 18  SpO2:  [96 %-100 %] 96 %  BP: (130-174)/(78-90) 140/83     Weight: 84.2 kg (185 lb 10 oz)  Body mass index is 29.07 kg/m².    Physical Exam  Constitutional: She is oriented to person, place, and time. She appears well-developed and well-nourished.   Eyes: Conjunctivae and EOM are normal. Pupils are equal, round, and reactive to light. Right eye exhibits no discharge. Left eye exhibits no discharge. No scleral icterus.   Neck: Normal range of motion. Neck supple.   Cardiovascular: Normal rate and regular rhythm.    Pulmonary/Chest: Effort normal and breath sounds normal.   Abdominal: Bowel sounds are normal. She exhibits no distension.   Musculoskeletal: Normal range of motion.   Neurological: She is oriented to person, place, and time. She has normal strength.          NEUROLOGICAL EXAMINATION:      MENTAL STATUS   Oriented to person, place, and time.   Level of consciousness: alert       Partial expressive aphasia      CRANIAL NERVES      CN III, IV, VI   Pupils are equal, round, and reactive to light.  Extraocular motions are normal.   Right pupil: Size: 2 mm.   Left pupil: Size: 2 mm.      CN VII   Right facial weakness: central  Left facial weakness: none     CN IX, X   CN IX normal.   CN X normal.      CN XI   CN XI normal.      CN XII   CN XII normal.      MOTOR EXAM   Muscle bulk: normal  Overall muscle tone: normal  Right arm tone: normal  Left arm tone: normal  Right leg tone: normal  Left leg tone: normal     Strength   Strength 5/5 throughout.      SENSORY EXAM   Right arm light touch: normal  Left arm light touch: normal  Right arm vibration: normal  Left arm vibration: normal       Significant Labs:   CBC:   Recent Labs  Lab 11/06/17  0522   WBC 5.30   HGB 8.1*   HCT 27.4*        CMP:   Recent Labs  Lab 11/06/17  0522         K 3.7      CO2 25   BUN 14   CREATININE 1.1   CALCIUM 9.8   MG 1.9   PROT 7.5   ALBUMIN 3.2*   BILITOT 0.3   ALKPHOS 87   AST 25   ALT 20   ANIONGAP 7*   EGFRNONAA 59*         Significant Imaging:  MRI brain  There is acute, ischemic infarct involving the posterior left frontal lobe.    This report has been flagged as abnormal in EPIC.       Electronically signed by: CHRIS CASTANEDA MD  Date: 11/06/17  Time: 11:36         Echo  CONCLUSIONS     1 - Normal left ventricular systolic function (EF 55-60%).     2 - No wall motion abnormalities.     3 - Trivial mitral regurgitation.     4 - Trivial tricuspid regurgitation.     5 - No intracardiac source of embolus noted on this exam.  If high clinical suspicion, consider ANALIA +/- bubble study.           This document has been electronically    SIGNED BY: Yevgeniy Simpson MD On: 11/06/2017 13:11    Assessment and Plan:     52 y/o female with stroke     1. Stroke:  likely located in left hemisphere. Pt seem to have other areas of infarction; etiology not clear at this point. She does have uncontrolled HTN and is a smoker. No arrhythmias on telemetry.           -MRI/MRA brain and neck showed new area of infarction on left frontal lobe correlating with her speech disturbances.           -OK for /150's           -Echo as above           -ASA and statin           -Smoking cessation           -ST   -No other recs for now. Follow up with neurology.    Active Diagnoses:    Diagnosis Date Noted POA    PRINCIPAL PROBLEM:  Expressive aphasia [R47.01] 11/05/2017 Yes    Essential hypertension [I10] 11/05/2017 Yes     Chronic    Microcytic anemia [D50.9] 11/05/2017 Yes     Chronic    Tobacco abuse [Z72.0] 11/05/2017 Yes     Chronic      Problems Resolved During this Admission:    Diagnosis Date Noted Date Resolved POA       VTE Risk Mitigation         Ordered     enoxaparin injection 40 mg  Daily     Route:  Subcutaneous        11/05/17 2020     Medium Risk of VTE  Once      11/05/17 2020     Place sequential compression device  Until discontinued      11/05/17 2020          Yaakov Dorantes MD  Neurology  [facility]

## 2017-11-07 NOTE — PT/OT/SLP PROGRESS
Speech Language Pathology  Treatment    Chris De La Paz   MRN: [id number]   Admitting Diagnosis: Expressive aphasia    Diet recommendations: Solid Diet Level: Regular  Liquid Diet Level: Thin HOB to 90 degrees    SLP Treatment Date: 11/07/17  Speech Start Time: 1150     Speech Stop Time: 1220     Speech Total (min): 30 min       TREATMENT BILLABLE MINUTES:  Speech Therapy Individual 30    Has the patient been evaluated by SLP for swallowing? : No  Keep patient NPO?: No   General Precautions: Standard, aphasia        Subjective:  Pt reporting her family told her that her articulation has improved since admit. She also reports that she feels better about her prognosis of recovery of speech language skills than she did yesterday.     Objective: Pt utilized speech intelligibility and fluency strategies for multi syllabic targets with min assist. At conversation level Pt required min-mod cuing to utilize strategies. Speech intell.  in unknown contexts has risen to ignacio. 90% as compared to yesterday's session, utilizing intell. strategies continues to improve speech to functional limits. Fluency deficits during multisylabic words persists. Pt was educated regarding word finding strategies which she was able to demonstrate use of with min cuing. Pt performed abstract divergent naming task with min assist.          Assessment:  Chris De La Paz is a 51 y.o. female with a medical diagnosis of CVA and presents with mild dysarthria, mild apraxia of speech and swallow, and trace-mild cognitive linguistic deficits.     Discharge recommendations: Discharge Facility/Level Of Care Needs: outpatient speech therapy     Goals:    SLP Goals        Problem: SLP Goal    Goal Priority Disciplines Outcome   SLP Goal    Medium SLP Ongoing (interventions implemented as appropriate)   Description:  STGS  1. Pt will tolerate regular with thin liquids without overt s/s of aspiration.   2. Pt will utilize speech intelligibility strategies (I) at level  of conversation.   3. Pt will utilize speech fluency strategies (I) at level of conversation.   4. Pt will utilize word finding strategies (I) at the level of conversation.  5. Pt will perform complex divergent naming tasks with min assist.                      Plan: Continue ST post d/c   Patient to be seen Therapy Frequency: 5 x/week, 4 x/week   Plan of Care expires: 11/06/17  Plan of Care reviewed with:    SLP Follow-up?: Yes         SLP G-Codes  Functional Assessment Tool Used: FIMS  Score: 5  Functional Limitations: Motor speech  Motor Speech Current Status (): CI  Motor Speech Goal Status (): CI    Faby Nance CCC-SLP  11/07/2017

## 2017-11-07 NOTE — PROGRESS NOTES
[facility]    WRITTEN HEALTHCARE AND DISCHARGE INFORMATION       Follow-up Information     Ammy Jordan MD On 11/14/2017.    Specialty:  Family Medicine  Why:  @10:00am for hospital follow up, Outpatient services  Contact information:  [place] [address]  [phone]             Anupam Hoffman MD. Go on 11/9/2017.    Specialty:  Neurology  Why:  Neurology follow up, please arrive at 10:20 AM  Contact information:  [address] [facility]  [address]  [place] [address]  [phone]             Out Patient Occupational and Speech Therapy.                                Help at Home           [phone]  After discharge for assistance [facility] On Call Nurse Care Line 24/7  Assistance    Things You are responsible For To Manage Your Care At Home:  1.    Getting your prescriptions filled   2.    Taking your medications as directed, DO NOT MISS ANY DOSES!  3.    Going to your follow-up doctor appointment. This is important because it  allow the doctor to monitor your progress and determine if  any changes need to made to your treatment plan.     Thank you for choosing [facility] for your care.  Please answer any calls you may receive from [facility] we want to continue to support you as you manage your healthcare needs. [facility] is happy to have the opportunity to serve you.     Sincerely,  Your [facility] Team,  EVELYN Saeed, ACM-RN; Senior Transition Navigator [phone]    Above information printed and presented to patient who verbalized understanding

## 2017-11-08 LAB
CARDIOLIPIN IGG SER IA-ACNC: <9.4 GPL
CARDIOLIPIN IGM SER IA-ACNC: <9.4 MPL

## 2017-11-09 ENCOUNTER — OFFICE VISIT (OUTPATIENT)
Dept: NEUROLOGY | Facility: CLINIC | Age: 52
End: 2017-11-09
Payer: COMMERCIAL

## 2017-11-09 VITALS
HEART RATE: 98 BPM | SYSTOLIC BLOOD PRESSURE: 143 MMHG | HEIGHT: 67 IN | WEIGHT: 185.63 LBS | BODY MASS INDEX: 29.13 KG/M2 | DIASTOLIC BLOOD PRESSURE: 78 MMHG

## 2017-11-09 DIAGNOSIS — I10 ESSENTIAL HYPERTENSION: Chronic | ICD-10-CM

## 2017-11-09 DIAGNOSIS — R47.01 EXPRESSIVE APHASIA: Primary | ICD-10-CM

## 2017-11-09 DIAGNOSIS — Z72.0 TOBACCO ABUSE: Chronic | ICD-10-CM

## 2017-11-09 PROCEDURE — 99999 PR PBB SHADOW E&M-EST. PATIENT-LVL III: CPT | Mod: PBBFAC,,, | Performed by: NEUROLOGICAL SURGERY

## 2017-11-09 PROCEDURE — 99215 OFFICE O/P EST HI 40 MIN: CPT | Mod: S$GLB,,, | Performed by: NEUROLOGICAL SURGERY

## 2017-11-09 NOTE — PROGRESS NOTES
Chief Complaint   Patient presents with    Neurologic Problem     expressive Aphasia        Chris De La Paz is a 51 y.o. female with a history of multiple medical diagnoses as listed below that presents for follow-up after being hospitalized for a stroke. She was admitted in early November for slurred speech and numbness in the right side of her face as well. The symptoms began on the night prior to her presentation, but after there was no resolution in her symptoms the next morning she presented tot [facility]. She has felt that the numbness in the face is better and the speech difficulty is much better than before. There was one prior episode when she began to have weakness in her right arm that spontaneously resolved.     PAST MEDICAL HISTORY:  Past Medical History:   Diagnosis Date    Hypertension        PAST SURGICAL HISTORY:  Past Surgical History:   Procedure Laterality Date    TUBAL LIGATION         SOCIAL HISTORY:  Social History     Social History    Marital status: Single     Spouse name: N/A    Number of children: N/A    Years of education: N/A     Occupational History    Not on file.     Social History Main Topics    Smoking status: Current Every Day Smoker     Packs/day: 0.50     Types: Cigarettes    Smokeless tobacco: Never Used    Alcohol use Yes      Comment: socially    Drug use: No    Sexual activity: Yes     Partners: Male     Birth control/ protection: None     Other Topics Concern    Not on file     Social History Narrative    No narrative on file       FAMILY HISTORY:  Family History   Problem Relation Age of Onset    Hypertension Mother     Hypotension Father        ALLERGIES AND MEDICATIONS: updated and reviewed.  Review of patient's allergies indicates:  No Known Allergies  Current Outpatient Prescriptions   Medication Sig Dispense Refill    aspirin (ECOTRIN) 325 MG EC tablet Take 1 tablet (325 mg total) by mouth once daily.  0    nicotine (NICODERM CQ) 14 mg/24 hr Place 1  patch onto the skin once daily.  0    ramipril (ALTACE) 5 MG capsule Take 1 capsule (5 mg total) by mouth once daily. 90 capsule 3    rosuvastatin (CRESTOR) 20 MG tablet Take 1 tablet (20 mg total) by mouth every evening. 90 tablet 3     No current facility-administered medications for this visit.        Review of Systems   Constitutional: Negative for activity change, appetite change, fever and unexpected weight change.   HENT: Negative for trouble swallowing and voice change.    Eyes: Negative for photophobia and visual disturbance.   Respiratory: Negative for apnea and shortness of breath.    Cardiovascular: Negative for chest pain and leg swelling.   Gastrointestinal: Negative for constipation and nausea.   Genitourinary: Negative for difficulty urinating.   Musculoskeletal: Negative for back pain, gait problem and neck pain.   Skin: Negative for color change and pallor.   Neurological: Positive for speech difficulty. Negative for dizziness, seizures, syncope, weakness and numbness.   Hematological: Negative for adenopathy.   Psychiatric/Behavioral: Negative for agitation, confusion and decreased concentration.       Neurologic Exam     Mental Status   Oriented to person, place, and time.   Registration: recalls 3 of 3 objects.   Attention: normal. Concentration: normal.   Speech: slurred   Level of consciousness: alert  Knowledge: good.     Cranial Nerves     CN II   Visual fields full to confrontation.   Right visual field deficit: none  Left visual field deficit: none     CN III, IV, VI   Pupils are equal, round, and reactive to light.  Extraocular motions are normal.   Right pupil: Size: 3 mm. Shape: regular. Accommodation: intact.   Left pupil: Size: 3 mm. Shape: regular. Accommodation: intact.   CN III: no CN III palsy  CN VI: no CN VI palsy  Nystagmus: none   Diplopia: none  Ophthalmoparesis: none  Upgaze: normal  Downgaze: normal  Conjugate gaze: present    CN V   Facial sensation intact.   Right  facial sensation deficit: none  Left facial sensation deficit: none    CN VII   Facial expression full, symmetric.   Right facial weakness: none  Left facial weakness: none    CN VIII   CN VIII normal.     CN IX, X   CN IX normal.   CN X normal.   Palate: symmetric    CN XI   CN XI normal.   Right sternocleidomastoid strength: normal  Left sternocleidomastoid strength: normal  Right trapezius strength: normal  Left trapezius strength: normal    CN XII   CN XII normal.   Tongue deviation: none    Motor Exam   Muscle bulk: normal  Overall muscle tone: normal  Right arm tone: normal  Left arm tone: normal  Right leg tone: normal  Left leg tone: normal    Strength   Strength 5/5 throughout.     Sensory Exam   Right arm light touch: normal  Left arm light touch: normal  Right leg light touch: normal  Left leg light touch: normal  Right arm vibration: normal  Left arm vibration: normal  Right leg vibration: normal  Left leg vibration: normal  Right arm proprioception: normal  Left arm proprioception: normal  Right leg proprioception: normal  Left leg proprioception: normal  Right arm pinprick: normal  Left arm pinprick: normal  Right leg pinprick: normal  Left leg pinprick: normal    Gait, Coordination, and Reflexes     Gait  Gait: normal    Coordination   Romberg: negative  Finger to nose coordination: normal  Heel to shin coordination: normal  Tandem walking coordination: normal    Tremor   Resting tremor: absent    Reflexes   Right brachioradialis: 2+  Left brachioradialis: 2+  Right biceps: 2+  Left biceps: 2+  Right triceps: 2+  Left triceps: 2+  Right patellar: 2+  Left patellar: 2+  Right achilles: 2+  Left achilles: 2+  Right plantar: normal  Left plantar: normal      Physical Exam   Constitutional: She is oriented to person, place, and time. She appears well-developed and well-nourished.   HENT:   Head: Normocephalic and atraumatic.   Eyes: EOM are normal. Pupils are equal, round, and reactive to light.   Neck:  "Normal range of motion.   Cardiovascular: Normal rate and intact distal pulses.    Pulmonary/Chest: Effort normal. No apnea. No respiratory distress.   Musculoskeletal: Normal range of motion.   Neurological: She is alert and oriented to person, place, and time. She has normal strength. She has a normal Finger-Nose-Finger Test, a normal Heel to Shin Test, a normal Romberg Test and a normal Tandem Gait Test. Gait normal.   Reflex Scores:       Tricep reflexes are 2+ on the right side and 2+ on the left side.       Bicep reflexes are 2+ on the right side and 2+ on the left side.       Brachioradialis reflexes are 2+ on the right side and 2+ on the left side.       Patellar reflexes are 2+ on the right side and 2+ on the left side.       Achilles reflexes are 2+ on the right side and 2+ on the left side.  Skin: Skin is warm and dry.   Psychiatric: She has a normal mood and affect. Her behavior is normal. Thought content normal. Her speech is slurred.   Vitals reviewed.      Vitals:    11/09/17 1032   BP: (!) 143/78   BP Location: Left arm   Patient Position: Sitting   BP Method: Large (Automatic)   Pulse: 98   Weight: 84.2 kg (185 lb 10 oz)   Height: 5' 7" (1.702 m)       Assessment & Plan:    Problem List Items Addressed This Visit     Essential hypertension (Chronic)    Tobacco abuse (Chronic)    Expressive aphasia - Primary    Relevant Orders    Ambulatory referral to Speech Therapy          Follow-up: Return in about 3 months (around 2/9/2018).  More than 50% of this 45 minute encounter was spent in counseling and coordinating care.      "

## 2017-11-09 NOTE — LETTER
November 9, 2017      Yaakov Dorantes MD  [address]  [place] [address]           [facility] [address] [facility] [address]  [facility] [address]  Phone: [phone]  Fax: [phone]          Patient: Chris De La Paz   MR Number: [id number]   YOB: 1965   Date of Visit: 11/9/2017       Dear Dr. Yaakov Dorantes:    Thank you for referring Chris De La Paz to me for evaluation. Attached you will find relevant portions of my assessment and plan of care.    If you have questions, please do not hesitate to call me. I look forward to following Chris De La Paz along with you.    Sincerely,    Anupam Hoffman MD    Enclosure  CC:  No Recipients    If you would like to receive this communication electronically, please contact [email or web address] or [phone] to request more information on [facility] [employer] access.    For providers and/or their staff who would like to refer a patient to [facility], please contact us through our one-stop-shop provider referral line, [facility] [place], at [phone].    If you feel you have received this communication in error or would no longer like to receive these types of communications, please e-mail [email or web address]

## 2017-11-10 LAB — F5 P.R506Q BLD/T QL: NORMAL

## 2017-11-14 ENCOUNTER — OFFICE VISIT (OUTPATIENT)
Dept: FAMILY MEDICINE | Facility: CLINIC | Age: 52
End: 2017-11-14
Payer: COMMERCIAL

## 2017-11-14 VITALS
HEART RATE: 90 BPM | BODY MASS INDEX: 26.78 KG/M2 | WEIGHT: 170.63 LBS | HEIGHT: 67 IN | OXYGEN SATURATION: 97 % | TEMPERATURE: 98 F | DIASTOLIC BLOOD PRESSURE: 98 MMHG | SYSTOLIC BLOOD PRESSURE: 142 MMHG

## 2017-11-14 DIAGNOSIS — I69.30 HISTORY OF CVA WITH RESIDUAL DEFICIT: ICD-10-CM

## 2017-11-14 DIAGNOSIS — Z09 HOSPITAL DISCHARGE FOLLOW-UP: Primary | ICD-10-CM

## 2017-11-14 DIAGNOSIS — R47.01 EXPRESSIVE APHASIA: ICD-10-CM

## 2017-11-14 DIAGNOSIS — I10 ESSENTIAL HYPERTENSION: Chronic | ICD-10-CM

## 2017-11-14 PROCEDURE — 99999 PR PBB SHADOW E&M-EST. PATIENT-LVL IV: CPT | Mod: PBBFAC,,, | Performed by: NURSE PRACTITIONER

## 2017-11-14 PROCEDURE — 99213 OFFICE O/P EST LOW 20 MIN: CPT | Mod: S$GLB,,, | Performed by: NURSE PRACTITIONER

## 2017-11-14 NOTE — MEDICAL/APP STUDENT
Subjective:       Patient ID: Chris De La Paz is a 51 y.o. female.    Chief Complaint: Hospital Follow Up    Chris De La Paz is a 51 y.o. female with a history of multiple medical diagnoses as listed below that presents to the clinic today  for follow-up after being hospitalized for a stroke. She was admitted in early November for slurred speech and numbness in the right side of her face as well. The symptoms began on the night prior to her presentation, but after there was no resolution in her symptoms the next morning she presented to the hospital. She  States the numbness in the face is better and the speech difficulty is much better than before. There was one prior episode when she began to have weakness in her right arm that spontaneously resolved. Today, she reports intermittent hemianopia, mild expressive aphasia, and paraesthesia in fingers.             Past Medical History:   Diagnosis Date    Hypertension     Stroke      Past Surgical History:   Procedure Laterality Date    TUBAL LIGATION       Family History   Problem Relation Age of Onset    Hypertension Mother     Hypotension Father      Social History     Social History    Marital status: Single     Spouse name: N/A    Number of children: N/A    Years of education: N/A     Occupational History    Not on file.     Social History Main Topics    Smoking status: Current Some Day Smoker     Packs/day: 0.50     Types: Cigarettes    Smokeless tobacco: Never Used    Alcohol use Yes      Comment: socially    Drug use: No    Sexual activity: Yes     Partners: Male     Birth control/ protection: None     Other Topics Concern    Not on file     Social History Narrative    No narrative on file         Review of Systems   Constitutional: Negative for activity change.   Eyes: Positive for visual disturbance.   Respiratory: Negative for cough, chest tightness and shortness of breath.    Cardiovascular: Negative for chest pain, palpitations and leg  swelling.   Musculoskeletal: Negative for arthralgias, gait problem and myalgias.   Neurological: Positive for speech difficulty. Negative for dizziness, tremors, seizures, syncope, facial asymmetry, weakness, light-headedness, numbness and headaches.   Hematological: Negative for adenopathy. Does not bruise/bleed easily.   Psychiatric/Behavioral: Negative for confusion.       Objective:      Physical Exam   Constitutional: She is oriented to person, place, and time. Vital signs are normal. She appears well-developed and well-nourished.   HENT:   Head: Normocephalic and atraumatic.   Right Ear: External ear normal.   Left Ear: External ear normal.   Nose: Nose normal.   Mouth/Throat: Uvula is midline, oropharynx is clear and moist and mucous membranes are normal.   Eyes: Conjunctivae and EOM are normal. Pupils are equal, round, and reactive to light.   Neck: Normal range of motion and full passive range of motion without pain. Neck supple.   Cardiovascular: Normal rate, regular rhythm and normal heart sounds.    Pulmonary/Chest: Effort normal and breath sounds normal.   Abdominal: Soft. Normal appearance.   Musculoskeletal: Normal range of motion.   Neurological: She is alert and oriented to person, place, and time. She has normal strength. She displays a negative Romberg sign. GCS eye subscore is 4. GCS verbal subscore is 5. GCS motor subscore is 6.   Skin: Skin is warm, dry and intact.   Psychiatric: She has a normal mood and affect.   Nursing note and vitals reviewed.      Assessment:       1. Hospital discharge follow-up    2. History of CVA with residual deficit    3. Expressive aphasia    4. Essential hypertension      Plan:       Chris was seen today for hospital follow up.    Diagnoses and all orders for this visit:    Expressive aphasia  The current medical regimen is effective;  continue present plan and medications. Followed by neurology   History of CVA with residual deficit  Stable, asymptomatic,  monitor; followed by neurology   Essential hypertension  Discussed sodium restriction, maintaining ideal body weight and regular exercise program as physiologic means to achieve blood pressure control. The patient will strive towards this. The current medical regimen is effective; continue present plan and medications. Recommended patient to check home readings to monitor and see me for followup as scheduled or sooner as needed. Patient was educated that both decongestant and anti-inflammatory medication may raise blood pressure      Patient discharged to home in stable condition with instructions to:   1. Please take all meds as prescribed.  2. Follow-up with your primary care doctor   3. Return precautions discussed and patient and/or family/caretaker understands to return to the emergency room for any concerns including worsening of your current symptoms, fever, chills, night sweats, worsening pain, chest pain, shortness of breath, nausea, vomiting, diarrhea, bleeding, headache, difficulty talking, visual disturbances, weakness, numbness or any other acute concerns     Patient to keep follow up appointment with PCP.

## 2017-11-14 NOTE — PATIENT INSTRUCTIONS
Stroke (Completed)    You have had a mild stroke, or cerebrovascular accident (CVA). This is caused by a loss of blood flow to part of your brain. This can occur when a blood clot forms inside the carotid artery (main artery from the heart to the brain) or inside the heart. When the clot travels to the brain, it can lodge in a blood vessel and block blood flow. The other common cause of stroke is a gradual narrowing of the arteries in the brain due to buildup of fatty deposits (plaque).  Symptoms  Blocked blood flow in different areas of the brain can cause different symptoms. If you have had a stroke before, a new one may be different. A memory aid for the basic signs of a stroke is F.A.S.T.  F.A.S.T.  · F: Face drooping, or numbness on one side. This may be more noticeable when you ask the affected person to smile.  · A: Arm weakness or numbness. The affected person may have trouble using or lifting one side.  · S: Speech difficulty. Speech may be slurred or hard to understand. The affected person may also use the wrong words.  · T: Time to call 911. Time is critical in treating a stroke. Call 911 as soon as you suspect a stroke has happened--even a small one. The sooner treatment is started the better, even if the symptoms go away.  Other common symptoms of a stroke include:  · Having difficulty getting the right words to come out  · Weakness in one leg  · Numbness on one side  · Difficulty walking  · Trouble with coordination  · Trouble with vision  · Headache  · Confusion  · Dizziness  Treatment  After you have had a stroke, you are at risk of having another. Be sure to follow up with your healthcare provider for further evaluation and treatment. If problems are found, your healthcare provider will recommend treatment with medicines and/or procedures.  To reduce your chance of having another stroke, you may be prescribed medicines. These include medicines to prevent blood clots, such as antiplatelet or  anticoagulant medicines.  Home care  · Rest at home and avoid exertion for the next few days.  · If your healthcare provider has prescribed medicines, take them as directed.  Follow-up care  Follow up with your healthcare provider, or as advised. Additional tests may be needed. If you had an X-ray, CT scan, MRI, or ECG (electrocardiogram), it will be reviewed by a specialist. You will be notified of any new findings that will affect your care.  Call 911  Contact emergency services right away if any of these occur:  · Any of your stroke symptoms worsen  · New problems with speech, confusion, vision, walking, coordination, facial droop, or weakness or numbness on one side of your body  · Severe headache, fainting spell, dizziness, or seizure  · Chest pain or shortness of breath  Remember F.A.S.T. (described above). If you notice warning signs and symptoms of stroke, CALL 911 without delay.  Date Last Reviewed: 9/21/2015  © 9711-3275 [employer]. [address] [place] [address], [place] [address]. All rights reserved. This information is not intended as a substitute for professional medical care. Always follow your healthcare professional's instructions.

## 2017-11-17 ENCOUNTER — CLINICAL SUPPORT (OUTPATIENT)
Dept: REHABILITATION | Facility: HOSPITAL | Age: 52
End: 2017-11-17
Attending: HOSPITALIST
Payer: COMMERCIAL

## 2017-11-17 DIAGNOSIS — R47.1 DYSARTHRIA: ICD-10-CM

## 2017-11-17 PROCEDURE — 92523 SPEECH SOUND LANG COMPREHEN: CPT | Mod: PN

## 2017-11-17 NOTE — PLAN OF CARE
Date: 11/17/2017    Start Time:  1015  Stop Time:  1100      OUTPATIENT NEUROLOGICAL REHABILITATION  SPEECH THERAPY EVALUATION    Subjective/History  Onset Date:  November 2017  Primary Diagnosis:  CVA  Treatment Diagnosis:    1. Dysarthria     Referring Provider:  Dr. Anupam Lepe   Orders: ST for evaluation and treat  Current Medical History: Chris De La Paz   presents to the [facility] secondary to the diagnosis of CVA.   Past Medical History:   Past Medical History:   Diagnosis Date    Hypertension     Stroke      Precautions:  general  Prior Therapy:  ST while admitted to the hospital  Pain:   0/10  Nutrition:  Regular diet, thin liquids  Environmental Concerns/Cultural/Spiritual/Developmental/Educational Needs: none  Prior Level of Function: Independent  Social History:  Ms. De La Paz is an  at [employer].   Signs of Abuse: No  Functional Deficits Leading to Referral/Nature of Injury:  Decreased clarity of speech and increased word finding deficits  Patient Therapy Goals:  To get back to where I was before my stroke    Objective   Auditory Comprehension: WFL for the purpose of assessment and conversation.    Reading Comprehension: no concerns reported. Will assess as necessary.    Verbal Expression: reported word finding deficits in conversation; however, Ms. De La Paz was able to name 16 items in a concrete category and 17 items in an abstract category (norm 15-20).     Written Expression: no concerns reported. Will assess as necessary.     Cognition: WFL for the purpose of assessment and conversation.     Motor Speech/Fluency/Voice:  The Frenchay Dysarthria Assessment-2nd Edition was administered to Ms. De La Paz to assess her motor speech skills. This test assesses the patient's reflexes, respiration, lips, palate, laryngeal function, tongue, and overall intelligibility. Results are described in the following tables:     Reflexes Respiration Lips Palate Laryngeal Tongue Intelligibility   Normal for Age  x  x      Mild Abnormality x  x  x  x   Abnormality obvious but can perform task with reasonable approximation      x    Some production of task poor in quality, unable to sustain, or extremely labored          Unable to undertake task/ movement/ sound            Description: Ms. De La Paz presents with mildly impaired motor speech skills . Her reflexes are considered mildly impaired 2/2 abnormally slow rate of consumption of 1/4c of water and cookie and occasional dampness at the corners of her mouth. Her respiration is considered adequate for speech production. Ms. De La Paz demonstrated decreased protrusion and retraction on her right side in non-speech movements. In speech, she demonstrated slow rate of speech in phrases containing bilabials. Her laryngeal skills are considered mildly abnormal. She was able to produce 5 distinct pitches when singing a scale, but pitch breaks were evident. She manipulated her volume IND'ly. A slight huskiness was observed in conversation. Ms. De La Paz demonstrated adequate ROM in nonspeech tasks, but clarity of speech deteriorated in speech tasks. Particularly, she demonstrated decreased precision producing back sounds (i.e. /k/, /g/) that lead to decreased rate of speech and decreased intelligibility. Ms. De La Paz was considered to be 80% intelligible to an unfamiliar listener with careful listening.     Swallowing: no concerns reported. Will monitor and assess as necessary.    Hearing: no concerns reported. Will monitor and refer as necessary.    Assessment/Impressions    Ms. De La Paz presents to [facility] s/p CVA. She presents with mild dysarthria c/b imprecise consonants, slow rate of speech, and hoarse vocal quality. Patient will benefit from outpatient neurological rehabilitation speech therapy.      Rehab Potential: good    Short Term Goals (4 weeks):   1. Pt will recall 3/4  word finding strategies.   2. Pt will recall 3/4 motor speech strategies.  3. Pt will utilize motor speech strategies in speech tasks with /k/ and /g/ phonemes with 90% acc IND'ly.  4. Pt will utilize motor speech strategies in speech tasks with bilabials with 90% acc INd'ly.   5. Pt will participate in an informal assessment of voice.     Long Term Goals (8 weeks):   1. Pt will develop functional and intelligible speech and utilize compensatory strategies through the use of adequate labial and lingual function, increased articulatory precision and speech prosody.        Education: POC was discussed with pt. Patient and family members expressed understanding. Pt requested to reduced therapy frequency from recommended 2x/ week to 1x/ week 2/2 copay.    Plan  Certification Period: 11/13/17 to 12/31/17  Plan of Care Certification Period: 11/17/17 to 1/12/18    Recommended Treatment Plan:  Patient will participate in the [facility] program for speech therapy 1 times per week to address her  Motor Speech deficits, to educate patient and their family, and to participate in a home exercise program.    Other Recommendations: none at this time      Therapist's Name: FRANDY Aamral, CCC-SLP     Date: 11/17/2017    I certify the need for these services furnished under this plan of treatment and while under my care.  ____________________________________ Physician/Referring Practitioner   Date of Signature

## 2017-11-18 ENCOUNTER — OFFICE VISIT (OUTPATIENT)
Dept: FAMILY MEDICINE | Facility: CLINIC | Age: 52
End: 2017-11-18
Payer: COMMERCIAL

## 2017-11-18 VITALS
SYSTOLIC BLOOD PRESSURE: 142 MMHG | BODY MASS INDEX: 25.61 KG/M2 | OXYGEN SATURATION: 98 % | WEIGHT: 169 LBS | TEMPERATURE: 99 F | HEART RATE: 105 BPM | HEIGHT: 68 IN | DIASTOLIC BLOOD PRESSURE: 88 MMHG

## 2017-11-18 DIAGNOSIS — J02.9 SORE THROAT: Primary | ICD-10-CM

## 2017-11-18 DIAGNOSIS — J02.9 PHARYNGITIS, UNSPECIFIED ETIOLOGY: ICD-10-CM

## 2017-11-18 LAB
CTP QC/QA: YES
S PYO RRNA THROAT QL PROBE: NEGATIVE

## 2017-11-18 PROCEDURE — 87880 STREP A ASSAY W/OPTIC: CPT | Mod: QW,S$GLB,, | Performed by: NURSE PRACTITIONER

## 2017-11-18 PROCEDURE — 99213 OFFICE O/P EST LOW 20 MIN: CPT | Mod: S$GLB,,, | Performed by: NURSE PRACTITIONER

## 2017-11-18 PROCEDURE — 99999 PR PBB SHADOW E&M-EST. PATIENT-LVL IV: CPT | Mod: PBBFAC,,, | Performed by: NURSE PRACTITIONER

## 2017-11-18 RX ORDER — AMOXICILLIN 500 MG/1
500 CAPSULE ORAL EVERY 8 HOURS
Qty: 21 CAPSULE | Refills: 0 | Status: SHIPPED | OUTPATIENT
Start: 2017-11-18 | End: 2017-11-25

## 2017-11-18 NOTE — PATIENT INSTRUCTIONS

## 2017-11-18 NOTE — PROGRESS NOTES
Subjective:       Patient ID: Chris De La Paz is a 51 y.o. female who presents to urgent care with complaints of sore throat that began this am, recent discharge from hospital from a CVA, throat is so sore she says it was making it diffucult to swallow, able to eat and able to take oral medication, no fever    Chief Complaint: Sore Throat    HPI  Review of Systems   Constitutional: Negative for appetite change, chills, fatigue, fever and unexpected weight change.   HENT: Positive for postnasal drip, sore throat and trouble swallowing. Negative for congestion, dental problem, drooling, ear discharge, ear pain, facial swelling, hearing loss, mouth sores, nosebleeds, rhinorrhea, sinus pain, sinus pressure, sneezing, tinnitus and voice change.    Respiratory: Negative for cough, chest tightness, shortness of breath and wheezing.    Cardiovascular: Negative for chest pain and leg swelling.   Gastrointestinal: Negative for abdominal distention, abdominal pain, anal bleeding, blood in stool, constipation, diarrhea, nausea, rectal pain and vomiting.   Genitourinary: Negative for difficulty urinating, dysuria, flank pain, frequency and hematuria.   Musculoskeletal: Negative for back pain, gait problem and joint swelling.   Neurological: Negative for syncope, weakness and numbness.   Hematological: Does not bruise/bleed easily.   Psychiatric/Behavioral: Negative for agitation, confusion, decreased concentration and hallucinations. The patient is not nervous/anxious and is not hyperactive.        Objective:      Physical Exam   Constitutional: She is oriented to person, place, and time. She appears well-developed and well-nourished.   HENT:   Head: Normocephalic.   Right Ear: Hearing, tympanic membrane, external ear and ear canal normal.   Left Ear: Hearing, tympanic membrane, external ear and ear canal normal.   Nose: Nose normal. Right sinus exhibits no maxillary sinus tenderness and no frontal sinus tenderness. Left sinus  exhibits no maxillary sinus tenderness and no frontal sinus tenderness.   Mouth/Throat: Uvula is midline. Posterior oropharyngeal edema and posterior oropharyngeal erythema present.   Eyes: Pupils are equal, round, and reactive to light.   Cardiovascular: Normal rate, regular rhythm and normal heart sounds.    Pulmonary/Chest: Effort normal and breath sounds normal. No respiratory distress. She has no wheezes. She has no rales.   Abdominal: Soft. She exhibits no mass. There is no rebound and no guarding.   Neurological: She is alert and oriented to person, place, and time.   Skin: Skin is warm and dry.   Psychiatric: She has a normal mood and affect. Her behavior is normal. Judgment and thought content normal.   Nursing note and vitals reviewed.      Assessment:       1. Sore throat    2. Pharyngitis, unspecified etiology        Plan:       Chris was seen today for sore throat.    Diagnoses and all orders for this visit:    Sore throat  -     POCT rapid strep A    Pharyngitis, unspecified etiology    Other orders  -     amoxicillin (AMOXIL) 500 MG capsule; Take 1 capsule (500 mg total) by mouth every 8 (eight) hours.    POCT strep negative    Education  Pt has been given instructions populated from [employer] database and those entered into patient instructions field and has verbalized understanding of the after visit summary and information contained wherein.    Follow Up  If no better 2-3 days fu with pcp.    In Case of Emergency   May go to ER for acute shortness of breath, lightheadedness, fever, or any other emergent complaints or changes in condition.

## 2017-11-24 ENCOUNTER — CLINICAL SUPPORT (OUTPATIENT)
Dept: REHABILITATION | Facility: HOSPITAL | Age: 52
End: 2017-11-24
Attending: HOSPITALIST
Payer: COMMERCIAL

## 2017-11-24 DIAGNOSIS — R47.1 DYSARTHRIA: ICD-10-CM

## 2017-11-24 PROCEDURE — 92507 TX SP LANG VOICE COMM INDIV: CPT | Mod: PN

## 2017-11-24 NOTE — PATIENT INSTRUCTIONS
"Motor Speech Strategies:  · Speak Slowly / Pacing: Try to slow your rate of speech when talking while keeping the same intonation or sing-song quality that is natural to your voice.   · Overarticulation: Over-say all of the sounds in your words.  · Speak Loudly: make sure to project your voice so that others can hear you.  · Deep Breath: Make sure to use deep breathing to support your speech. If you do not have enough breath support, it is difficult to over-articulate, speak loudly, or speak slowly.   · Open Your Mouth: Make sure to open your mouth widely while speaking.      Word Retrieval Strategies:   · Visualization: try to see the thing in your head. Concentrate on the details of the picture and sometimes the word will come  · Association:  Think of things that go with the word. For example, bread goes with butter, so if you are trying to think of the word "butter", you may think of the word "bread".  · Gesture: use the hand motion you would use with the thing you are thinking of. For example, if you are thinking of the word "wash", you might make a motion as if you are washing your hands.   · Description:  Describe the thing to the other person you are talking to. Even if the word does not come to you, the other person may be able to guess what you are trying to say.   · First Letter or Sound:  Try to think of the first letter of the word. Sometimes you can think of the first letter even if you cannot think of the word. The letter may "lead" you to the word. You might even try going down the alphabet to find the first letter.          "

## 2017-11-24 NOTE — PROGRESS NOTES
OUTPATIENT NEUROLOGICAL REHABILITATION  SPEECH THERAPY PROGRESS NOTE    Date:  11/24/2017    Start Time:  810  Stop Time:  850    Subjective/History  Onset Date:  November 2017  Primary Diagnosis:  CVA  Treatment Diagnosis:    1. Dysarthria      Referring Provider:  Dr. Anupam Lepe   Orders: ST for evaluation and treat  Current Medical History: Chris De La Paz   presents to the [facility] secondary to the diagnosis of CVA.     Past Medical History:   Past Medical History:   Diagnosis Date    Hypertension     Stroke      Precautions:  general          UNTIMED  Procedure Time Min.   Speech and language therapy Start:  810  Stop: 850  40     Start:   Stop:        Total Minutes: 40  Total Timed Units: 0  Total Untimed Units: 1  Charges Billed/# of units: 1    Visit #:  2  Date of Evaluation:  11/17/17  Plan of Care Expiration:    1/12/18  Extended POC:         Progress/Current Status    Subjective:     Pain: 0 /10  Pt reported that she was tired from being up all night [employer] shopping. Speech intelligibility was good despite pt reported fatigue.     Objective:     Short Term Goals: (4 weeks) Current Progress:   1. Pt will recall 3/4 word finding strategies. Pt recalled 4/4 word finding strategies. METx1   2. Pt will recall 3/4 motor speech strategies. Motor speech strategies introduced today. (i.e. Over articulation, open your mouth, speak slowly, and say all the sounds in your words). She verbalized understanding to all.    3. Pt will utilize motor speech strategies in speech tasks with /k/ and /g/ phonemes with 90% acc IND'ly. /k/ bisyllabic words: 100% acc IND'ly.    4. Pt will utilize motor speech strategies in speech tasks with bilabials with 90% acc INd'ly.  /p/ bisyllabic words: 100% acc however pt reported these were less precise than her baseline  /p/ short sentences with 80% acc INDly  /p/ long sentences with 70% acc INDly   5. Pt will  "participate in an informal assessment of voice. Goal Met 11/24/17 MPT - 15s (norm 15-25)  S/z ratio: 0.82 (>1.4 indicates possible vocal fold pathology)        Assessment:     Ms. Moore's voice was informally assessed today and judged to be WFL.     Ms. De La Paz presents with minimum impaired intelligibility skills. She presents with mild dysarthria. Pt would benefit from continued skilled ST. Prognosis for improvement remains Fair     Patient Education/Response:     Motor speech strategies and word finding strategies  were reviewed with pt. She verbalized understanding to all.   Motor Speech Strategies:  · Speak Slowly / Pacing: Try to slow your rate of speech when talking while keeping the same intonation or sing-song quality that is natural to your voice.   · Overarticulation: Over-say all of the sounds in your words.  · Speak Loudly: make sure to project your voice so that others can hear you.  · Deep Breath: Make sure to use deep breathing to support your speech. If you do not have enough breath support, it is difficult to over-articulate, speak loudly, or speak slowly.   · Open Your Mouth: Make sure to open your mouth widely while speaking.      Word Retrieval Strategies:   · Visualization: try to see the thing in your head. Concentrate on the details of the picture and sometimes the word will come  · Association:  Think of things that go with the word. For example, bread goes with butter, so if you are trying to think of the word "butter", you may think of the word "bread".  · Gesture: use the hand motion you would use with the thing you are thinking of. For example, if you are thinking of the word "wash", you might make a motion as if you are washing your hands.   · Description:  Describe the thing to the other person you are talking to. Even if the word does not come to you, the other person may be able to guess what you are trying to say.   · First Letter or Sound:  Try to think of the first letter of the " "word. Sometimes you can think of the first letter even if you cannot think of the word. The letter may "lead" you to the word. You might even try going down the alphabet to find the first letter.       Home Program: 120 bisyllabic /p/ and /b/ words    Plans and Goals:     Continue POC.  Practice use of motor speech strategies on the phone next session.      FRANDY Amaral, CCC-SLP  Speech Language Pathologist  11/24/2017       "

## 2017-11-27 ENCOUNTER — OFFICE VISIT (OUTPATIENT)
Dept: FAMILY MEDICINE | Facility: CLINIC | Age: 52
End: 2017-11-27
Payer: COMMERCIAL

## 2017-11-27 VITALS
DIASTOLIC BLOOD PRESSURE: 80 MMHG | SYSTOLIC BLOOD PRESSURE: 124 MMHG | BODY MASS INDEX: 26.47 KG/M2 | TEMPERATURE: 99 F | WEIGHT: 174.63 LBS | HEART RATE: 94 BPM | HEIGHT: 68 IN | OXYGEN SATURATION: 98 %

## 2017-11-27 DIAGNOSIS — I10 ESSENTIAL HYPERTENSION: Chronic | ICD-10-CM

## 2017-11-27 DIAGNOSIS — I69.30 HISTORY OF CVA WITH RESIDUAL DEFICIT: Primary | ICD-10-CM

## 2017-11-27 DIAGNOSIS — R47.01 EXPRESSIVE APHASIA: ICD-10-CM

## 2017-11-27 DIAGNOSIS — N89.8 VAGINAL DISCHARGE: ICD-10-CM

## 2017-11-27 LAB
CANDIDA RRNA VAG QL PROBE: NEGATIVE
G VAGINALIS RRNA GENITAL QL PROBE: NEGATIVE
T VAGINALIS RRNA GENITAL QL PROBE: NEGATIVE

## 2017-11-27 PROCEDURE — 99214 OFFICE O/P EST MOD 30 MIN: CPT | Mod: S$GLB,,, | Performed by: FAMILY MEDICINE

## 2017-11-27 PROCEDURE — 99999 PR PBB SHADOW E&M-EST. PATIENT-LVL IV: CPT | Mod: PBBFAC,,, | Performed by: FAMILY MEDICINE

## 2017-11-27 PROCEDURE — 87660 TRICHOMONAS VAGIN DIR PROBE: CPT

## 2017-11-27 PROCEDURE — 87480 CANDIDA DNA DIR PROBE: CPT

## 2017-11-28 NOTE — PROGRESS NOTES
Routine Office Visit    Patient Name: Chris De La Paz    : 1965  MRN: [id number]    Subjective:  Chris is a 51 y.o. female who presents today for     1. FMLA paperwork - pt had recent stroke on  and has been out of work. She did have hospital follow-up on  for medical clearance but was advised to follow-up with PCP. She has been released by her job but wants to complete [place] paperwork in order to file with her company. She is currently in speech therapy for residual effects of stroke. She has been evaluated by neurology and has follow-up appt in February.   2. Vaginal discharge - started a few days ago. Non-odorous. No itching. Pt does have hx of BV    Review of Systems   Constitutional: Negative for chills and fever.   HENT: Negative for congestion.    Eyes: Negative for blurred vision.   Respiratory: Negative for cough.    Cardiovascular: Negative for chest pain.   Gastrointestinal: Negative for abdominal pain, constipation, diarrhea, heartburn, nausea and vomiting.   Genitourinary: Negative for dysuria.   Musculoskeletal: Negative for myalgias.   Skin: Negative for itching and rash.   Neurological: Negative for dizziness and headaches.   Psychiatric/Behavioral: Negative for depression.       Active Problem List  Patient Active Problem List   Diagnosis    Expressive aphasia    Essential hypertension    Microcytic anemia    Tobacco abuse    History of CVA with residual deficit    Dysarthria       Past Surgical History  Past Surgical History:   Procedure Laterality Date    TUBAL LIGATION         Family History  Family History   Problem Relation Age of Onset    Hypertension Mother     Hypotension Father        Social History  Social History     Social History    Marital status: Single     Spouse name: N/A    Number of children: N/A    Years of education: N/A     Occupational History    Not on file.     Social History Main Topics    Smoking status: Former Smoker     Packs/day: 0.50      "Types: Cigarettes     Quit date: 11/11/2017    Smokeless tobacco: Never Used    Alcohol use Yes      Comment: socially    Drug use: No    Sexual activity: Yes     Partners: Male     Birth control/ protection: None     Other Topics Concern    Not on file     Social History Narrative    No narrative on file       Medications and Allergies  Reviewed and updated.   Current Outpatient Prescriptions   Medication Sig    aspirin (ECOTRIN) 325 MG EC tablet Take 1 tablet (325 mg total) by mouth once daily.    ramipril (ALTACE) 5 MG capsule Take 1 capsule (5 mg total) by mouth once daily.    rosuvastatin (CRESTOR) 20 MG tablet Take 1 tablet (20 mg total) by mouth every evening.    nicotine (NICODERM CQ) 14 mg/24 hr Place 1 patch onto the skin once daily.     No current facility-administered medications for this visit.        Physical Exam  /80 (BP Location: Right arm, Patient Position: Sitting, BP Method: Small (Manual))   Pulse 94   Temp 99.1 °F (37.3 °C) (Oral)   Ht 5' 8" (1.727 m)   Wt 79.2 kg (174 lb 9.7 oz)   LMP 09/13/2016   SpO2 98%   BMI 26.55 kg/m²   Physical Exam   Constitutional: She is oriented to person, place, and time. She appears well-developed and well-nourished.   HENT:   Head: Normocephalic and atraumatic.   Eyes: Conjunctivae and EOM are normal. Pupils are equal, round, and reactive to light.   Neck: Normal range of motion. Neck supple.   Cardiovascular: Normal rate, regular rhythm and normal heart sounds.  Exam reveals no gallop and no friction rub.    No murmur heard.  Pulmonary/Chest: Breath sounds normal. No respiratory distress.   Abdominal: Soft. Bowel sounds are normal. She exhibits no distension. There is no tenderness.   Musculoskeletal: Normal range of motion.   Lymphadenopathy:     She has no cervical adenopathy.   Neurological: She is alert and oriented to person, place, and time.   Skin: Skin is warm.   Psychiatric: She has a normal mood and affect. "         Assessment/Plan:  Chris De La Paz is a 51 y.o. female who presents today for :    History of CVA with residual deficit / Expressive aphasia  Complete FMLA paperwork   Continue speech therapy  Continue f/u with Neurology - Dr. Hoffman    Essential hypertension  The current medical regimen is effective;  continue present plan and medications.    Vaginal discharge  -     Vaginosis Screen by DNA Probe          Return in about 3 months (around 2/27/2018), or if symptoms worsen or fail to improve.

## 2017-11-30 ENCOUNTER — TELEPHONE (OUTPATIENT)
Dept: FAMILY MEDICINE | Facility: CLINIC | Age: 52
End: 2017-11-30

## 2017-11-30 NOTE — TELEPHONE ENCOUNTER
----- Message from April Jaime sent at 11/30/2017  1:19 PM CST -----  Contact: self   Patient calling for results of test.   Please call pt at [phone]

## 2017-11-30 NOTE — TELEPHONE ENCOUNTER
Vaginosis screen was negative  [facility]

## 2017-12-01 ENCOUNTER — DOCUMENTATION ONLY (OUTPATIENT)
Dept: REHABILITATION | Facility: HOSPITAL | Age: 52
End: 2017-12-01

## 2017-12-01 DIAGNOSIS — R47.1 DYSARTHRIA: ICD-10-CM

## 2017-12-01 NOTE — PROGRESS NOTES
OUTPATIENT NEUROLOGICAL REHABILITATION  SPEECH THERAPY DISCHARGE SUMMARY    Date:  12/01/2017      Subjective/History  Onset Date:  November 2017  Primary Diagnosis:  CVA  Treatment Diagnosis:    1. Dysarthria      Referring Provider:  Michell Leigh, Dr. Anupam Beltran   Orders: ST for evaluation and treat  Current Medical History: Chris De La Paz   presents to the [facility] secondary to the diagnosis of CVA.   Past Medical History:   Past Medical History:   Diagnosis Date    Hypertension     Stroke      Precautions:  general      Visit #: 2  Total # Of Visits:  3  Cancelled:  0  No Shows:  1  Date of Evaluation: 11/17/17  Plan of Care Expiration:   11/17/17 to 1/12/18        Progress/Current Status    Subjective:     Pt not present to rate pain.     Ms. De La Paz no showed for her appt scheduled 12/1/17. She then cancelled all visits via phone call and requested discharge.     Objective:   Physical/Functional Status:  At time of discharge, Mrs.. Chris De La Paz   was able to recall and utilize motor speech strategies.    Short Term Goals: (4 weeks) Current Progress:   1. Pt will recall 3/4 word finding strategies. Goal Met / Discontinue  Pt recalled 4/4 word finding strategies. METx1   2. Pt will recall 3/4 motor speech strategies. Goal Not Met / Discontinue  Motor speech strategies introduced today. (i.e. Over articulation, open your mouth, speak slowly, and say all the sounds in your words). She verbalized understanding to all.    3. Pt will utilize motor speech strategies in speech tasks with /k/ and /g/ phonemes with 90% acc IND'ly. Goal Met / Discontinue /k/ bisyllabic words: 100% acc IND'ly.    4. Pt will utilize motor speech strategies in speech tasks with bilabials with 90% acc INd'ly. Goal Not Met / Discontinue  /p/ bisyllabic words: 100% acc however pt reported these were less precise than her baseline  /p/ short sentences with 80% acc INDly  /p/ long  sentences with 70% acc INDly   5. Pt will participate in an informal assessment of voice. Goal Met 11/24/17 MPT - 15s (norm 15-25)  S/z ratio: 0.82 (>1.4 indicates possible vocal fold pathology)      Long Term Goals (8 weeks):   1. Pt will develop functional and intelligible speech and utilize compensatory strategies through the use of adequate labial and lingual function, increased articulatory precision and speech prosody. Goal Met / Discontinue     Assessment:     Ms. De La Paz presented with minimumally impaired intelligibility skills. She presents with mild dysarthria. Pt requested discharge from .    Patient Education/Response:     Motor speech strategies were reviewed at last therapy session.    Plans and Goals:     D/c ST.     Discharge Plan:  Ms. De La Paz  is being discharged from outpatient neuro rehab speech therapy for the following reason(s):  Patient requested discharge      FRANDY Amaral, CCC-SLP   12/1/2017

## 2017-12-11 ENCOUNTER — TELEPHONE (OUTPATIENT)
Dept: FAMILY MEDICINE | Facility: CLINIC | Age: 52
End: 2017-12-11

## 2017-12-11 NOTE — TELEPHONE ENCOUNTER
----- Message from Ewelina Espino sent at 12/11/2017  8:46 AM CST -----  Contact: joey - guardian avani Martinez calling to speak to a nurse regarding pt health. Please call [phone].

## 2017-12-28 ENCOUNTER — TELEPHONE (OUTPATIENT)
Dept: FAMILY MEDICINE | Facility: CLINIC | Age: 52
End: 2017-12-28

## 2017-12-28 NOTE — TELEPHONE ENCOUNTER
Patient requesting a return to work date on her [place] paper work. Patient had an office visit  on 11/30/17 with Dr. Jordan. Please Advise

## 2017-12-28 NOTE — LETTER
January 2, 2018      Chris De La Paz   [address] [facility] [place] [address]             [facility]  [address] [facility] [place] [address]  Phone: [phone]  Fax: [phone] Chris De La Paz    Was treated here on 11/27/17    May Return to work/school on 12/11/17.                Ammy Jordan MD/Fernie Starr LPN

## 2018-01-02 ENCOUNTER — TELEPHONE (OUTPATIENT)
Dept: FAMILY MEDICINE | Facility: CLINIC | Age: 53
End: 2018-01-02

## 2018-01-02 NOTE — TELEPHONE ENCOUNTER
----- Message from Krista Garcia sent at 12/29/2017  3:39 PM CST -----  Contact: Self   Patient called to check the status of her paperwork. Please call at [phone].

## 2018-01-18 ENCOUNTER — HOSPITAL ENCOUNTER (EMERGENCY)
Facility: HOSPITAL | Age: 53
Discharge: HOME | End: 2018-01-18
Attending: EMERGENCY MEDICINE
Payer: MEDICAID

## 2018-01-18 VITALS
BODY MASS INDEX: 25.11 KG/M2 | DIASTOLIC BLOOD PRESSURE: 84 MMHG | HEART RATE: 86 BPM | RESPIRATION RATE: 18 BRPM | SYSTOLIC BLOOD PRESSURE: 137 MMHG | HEIGHT: 67 IN | OXYGEN SATURATION: 99 % | TEMPERATURE: 98 F | WEIGHT: 160 LBS

## 2018-01-18 DIAGNOSIS — T46.4X5A ADVERSE REACTION TO ACE INHIBITOR DRUG, INITIAL ENCOUNTER: ICD-10-CM

## 2018-01-18 DIAGNOSIS — T78.3XXA ANGIOEDEMA, INITIAL ENCOUNTER: ICD-10-CM

## 2018-01-18 DIAGNOSIS — T78.3XXA ANGIOEDEMA OF LIPS, INITIAL ENCOUNTER: Primary | ICD-10-CM

## 2018-01-18 PROCEDURE — 25000003 PHARM REV CODE 250: Performed by: EMERGENCY MEDICINE

## 2018-01-18 PROCEDURE — 96375 TX/PRO/DX INJ NEW DRUG ADDON: CPT

## 2018-01-18 PROCEDURE — 63600175 PHARM REV CODE 636 W HCPCS: Performed by: EMERGENCY MEDICINE

## 2018-01-18 PROCEDURE — 96374 THER/PROPH/DIAG INJ IV PUSH: CPT

## 2018-01-18 PROCEDURE — 99284 EMERGENCY DEPT VISIT MOD MDM: CPT | Mod: 25

## 2018-01-18 PROCEDURE — S0028 INJECTION, FAMOTIDINE, 20 MG: HCPCS | Performed by: EMERGENCY MEDICINE

## 2018-01-18 RX ORDER — DIPHENHYDRAMINE HYDROCHLORIDE 50 MG/ML
25 INJECTION INTRAMUSCULAR; INTRAVENOUS
Status: COMPLETED | OUTPATIENT
Start: 2018-01-18 | End: 2018-01-18

## 2018-01-18 RX ORDER — PREDNISONE 20 MG/1
60 TABLET ORAL DAILY
Qty: 12 TABLET | Refills: 0 | Status: SHIPPED | OUTPATIENT
Start: 2018-01-18 | End: 2018-12-31

## 2018-01-18 RX ORDER — FAMOTIDINE 10 MG/ML
20 INJECTION INTRAVENOUS
Status: COMPLETED | OUTPATIENT
Start: 2018-01-18 | End: 2018-01-18

## 2018-01-18 RX ORDER — METHYLPREDNISOLONE SOD SUCC 125 MG
125 VIAL (EA) INJECTION
Status: COMPLETED | OUTPATIENT
Start: 2018-01-18 | End: 2018-01-18

## 2018-01-18 RX ORDER — FAMOTIDINE 20 MG/1
20 TABLET, FILM COATED ORAL DAILY
Qty: 5 TABLET | Refills: 0 | Status: SHIPPED | OUTPATIENT
Start: 2018-01-18 | End: 2018-01-23

## 2018-01-18 RX ORDER — DIPHENHYDRAMINE HCL 25 MG
25 CAPSULE ORAL EVERY 6 HOURS
Qty: 20 EACH | Refills: 0 | Status: SHIPPED | OUTPATIENT
Start: 2018-01-18 | End: 2018-01-23

## 2018-01-18 RX ORDER — AMLODIPINE BESYLATE 5 MG/1
5 TABLET ORAL DAILY
Qty: 30 TABLET | Refills: 3 | Status: SHIPPED | OUTPATIENT
Start: 2018-01-18 | End: 2023-11-12

## 2018-01-18 RX ADMIN — FAMOTIDINE 20 MG: 10 INJECTION, SOLUTION INTRAVENOUS at 04:01

## 2018-01-18 RX ADMIN — DIPHENHYDRAMINE HYDROCHLORIDE 25 MG: 50 INJECTION, SOLUTION INTRAMUSCULAR; INTRAVENOUS at 04:01

## 2018-01-18 RX ADMIN — METHYLPREDNISOLONE SODIUM SUCCINATE 125 MG: 125 INJECTION, POWDER, FOR SOLUTION INTRAMUSCULAR; INTRAVENOUS at 04:01

## 2018-01-18 NOTE — PROVIDER PROGRESS NOTES - EMERGENCY DEPT.
Encounter Date: 1/18/2018    ED Physician Progress Notes         the angioedema has improved.  The patient will be discharged with Benadryl, prednisone, and Pepcid.  She will follow-up with her primary care doctor.  She is stable for discharge.

## 2018-01-18 NOTE — ED TRIAGE NOTES
Pt presents to ED with c/o angioedema. Pt reports swelling to upper lip since midnight tonight. She reports taking Ramipril with compliance. Denies other allergies. No resp distress noted. Pt maintaining airway and secretions. No distress is noted. Family at bedside. Will continue to be monitored.

## 2018-01-18 NOTE — ED NOTES
Received report from mark rn; pt assessed and in NAD at this time; vitals and temp assessed; bed rails raised, call light in reach

## 2018-01-18 NOTE — ED NOTES
Pt requested to eat/drink something; notified md; md said not at this time due to swelling around the lips; informed pt and pt verbalized understanding

## 2018-01-18 NOTE — PROVIDER PROGRESS NOTES - EMERGENCY DEPT.
Encounter Date: 1/18/2018    ED Physician Progress Notes         the patient is awake and alert.  She is lying in bed comfortably.  There does not appear to be any worsening of her symptoms.  Her symptoms did begin around midnight with just a single swollen area on the upper lip.  When she woke up this morning it was much worse. She has already been treated with benadryl, steroids, and pepcid.  Will continue to monitor progress.

## 2018-01-18 NOTE — ED PROVIDER NOTES
Encounter Date: 1/18/2018    SCRIBE #1 NOTE: I, Guilherme Garcia, am scribing for, and in the presence of,  Teo Orantes MD. I have scribed the following portions of the note - Other sections scribed: HPI, ROS.       History     Chief Complaint   Patient presents with    Oral Swelling     Pt report lips swelling onset 2.5 hrs ago. Pt denies swallowing or breathing problems, takes ramipril 5mg.     CC: Oral Swelling    HPI: This 52 y.o. Female with PMHx HTN, CVA, and PSHx tubal ligation presents to the ED c/o of swelling to the upper lip. Pt reports waking up at 0000 this morning with mild lip swelling. Pt says the swelling worsened after she fell asleep and woke again. No alleviating or exacerbating factors reported. Pt denies itchiness. Pt denies difficulty breathing, difficulty swallowing, and SOB. No prior tx reported. Pt says she takes 5mg ramipril daily.      The history is provided by the patient. No  was used.     Review of patient's allergies indicates:  No Known Allergies  Past Medical History:   Diagnosis Date    Hypertension     Stroke      Past Surgical History:   Procedure Laterality Date    TUBAL LIGATION       Family History   Problem Relation Age of Onset    Hypertension Mother     Hypotension Father      Social History   Substance Use Topics    Smoking status: Former Smoker     Packs/day: 0.50     Types: Cigarettes     Quit date: 11/11/2017    Smokeless tobacco: Never Used    Alcohol use Yes      Comment: socially     Review of Systems   Constitutional: Negative for fever.   HENT: Positive for facial swelling (upper lip). Negative for sore throat and trouble swallowing.    Respiratory: Negative for choking and shortness of breath.    Cardiovascular: Negative for chest pain.   Gastrointestinal: Negative for nausea.   Genitourinary: Negative for dysuria.   Musculoskeletal: Negative for back pain.   Skin: Negative for rash.   Neurological: Negative for weakness.    Hematological: Does not bruise/bleed easily.       Physical Exam     Initial Vitals [01/18/18 0425]   BP Pulse Resp Temp SpO2   (!) 143/83 87 16 98.5 °F (36.9 °C) 98 %      MAP       103         Physical Exam    HENT:   Mouth/Throat: Oropharynx is clear and moist and mucous membranes are normal. She does not have dentures. No oral lesions. No trismus in the jaw. No uvula swelling, lacerations or dental caries. No oropharyngeal exudate, posterior oropharyngeal edema, posterior oropharyngeal erythema or tonsillar abscesses.   Upper lip swelling.   Lower lip nl.  Tongue is nl is caliber and girth.   Eyes: Conjunctivae and EOM are normal. Pupils are equal, round, and reactive to light.   Neck: Normal range of motion.   Cardiovascular: Regular rhythm and intact distal pulses.   Pulmonary/Chest: Effort normal and breath sounds normal. No accessory muscle usage. No respiratory distress. She has no decreased breath sounds. She has no wheezes.   Abdominal: Bowel sounds are normal. She exhibits no distension.   Musculoskeletal: Normal range of motion.   Neurological: She is alert and oriented to person, place, and time. She has normal strength.   Skin: Skin is warm. No erythema.                 ED Course   Procedures  Labs Reviewed - No data to display          Medical Decision Making:   History:   Old Medical Records: I decided to obtain old medical records.  Initial Assessment:   Medical decision-making:    The patient received a medical screening exam. If performed, the EKG was independently evaluated by me and is pending final cardiology evaluation.  If performed, all radiographic studies were independently evaluated by me and are pending final radiology evaluation. If labs were ordered, they were reviewed. Vital signs are independently assessed by me.  If performed, the pulse oximetry was independently evaluated by me.  I decided to obtain the patient's past medical record.  If available, I reviewed the patient's past  medical record, including most recent labs and radiology reports.    ED Management:  Patient is experiencing angioedema.  Patient takes an ACE inhibitor.  She has no respiratory involvement.  Swelling isolated only to upper lip.  See photo above.  Tongue is normal in caliber and girth.  Oropharynx normal.  Breath sounds are clear to auscultation.  Patient was given Solu-Medrol, Benadryl and Pepcid in the emergency department.  We will continue to monitor.  I have updated her ALLERGY list in encouraged her to discontinue taking Ace inhibitors.  I have given her prescription for Norvasc.  If after period of observation, the patient shows improvement were no worsening.  She may be discharged home with primary care follow-up.  Ultimate disposition will be at the hands of the oncoming physician team.    TAMIA Orantes M.D. 5:10 AM 1/18/2018              Scribe Attestation:   Scribe #1: I performed the above scribed service and the documentation accurately describes the services I performed. I attest to the accuracy of the note.    Attending Attestation:           Physician Attestation for Scribe:  Physician Attestation Statement for Scribe #1: I, Teo Orantes MD, reviewed documentation, as scribed by Guilherme Garcia in my presence, and it is both accurate and complete.                 ED Course      Clinical Impression:   The primary encounter diagnosis was Angioedema of lips, initial encounter. A diagnosis of Adverse reaction to ACE inhibitor drug, initial encounter was also pertinent to this visit.                           Teo Orantes MD  01/18/18 2953

## 2018-01-20 NOTE — TELEPHONE ENCOUNTER
Crush Injury of the Foot, No Fracture  A crush injury to your foot causes local pain, swelling, and sometimes bruising. There are no broken bones. This injury takes from a few days to a few weeks to heal. If the toenail has been severely injured, it may fall off in 1 to 2 weeks. A new one will usually start to grow back within a month.  Home care  The following guidelines will help you care for your wound at home:  · You may be given a splint, cast, shoe, or boot to prevent movement at the injury. Unless you were told otherwise, use crutches or a walker and dont bear weight on the injured foot until cleared by your doctor to do so. (Crutches and walkers can be rented at many pharmacies and surgical/orthopedic supply stores). Dont put weight on a splint, or it will break.  · Keep your leg elevated to reduce pain and swelling. When sleeping, place a pillow under the injured leg. When sitting, support the injured leg so it is level with your waist. This is very important during the first 2 days (48 hours).  · Put an ice pack on the injured area. Do this for 20 minutes every 1 to 2 hours the first day for pain relief. You can make an ice pack by wrapping a plastic bag of ice cubes in a thin towel. As the ice melts, be careful that the splint/cast/boot/shoe doesnt get wet. Continue using the ice pack 3 to 4 times a day until the pain and swelling go away.  · You may use acetaminophen or ibuprofen to control pain, unless another pain medicine was prescribed. If you have chronic liver or kidney disease, talk with your health care provider before using these medicines. Also talk with your provider if youve had a stomach ulcer or GI bleeding.  · Keep the splint/cast/boot/shoe dry. When bathing, protect it with a large plastic bag, rubber-banded at the top end. If a fiberglass splint/cast or boot gets wet, you can dry it with a hair dryer. Unless told otherwise, you can take off the boot or shoe to bathe.  · If your  ----- Message from Brad Yuong sent at 12/28/2017 10:25 AM CST -----  Contact: self  Pt called stating return to work date was not listed on [place] documents. She would like dates to be submitted via fax or letter. Contact her at [phone].    Thanks-   injury includes exposed cuts or scrapes, clean these daily with soap and water. Apply antibiotic ointment. Watch for the signs of infection listed below.  Follow-up care  Follow up with your doctor, or as advised. Return sooner if you dont start to get better within the next 3 days. If you were given a splint, it may be changed to a cast or boot at your follow-up visit.  X-rays will be checked by a radiologist. You will be told of any new findings that may affect your care.  When to seek medical advice  Call your health care provider right away if any of the following occur:  · The cast cracks  · The plaster cast or splint becomes wet or soft  · The fiberglass cast or splint remains wet for more than 24 hours  ·  Bad odor from the cast or wound-fluid stains the cast  · Increased tightness or pain under the cast or splint  · Toes become swollen, cold, blue, numb, or tingly  · Redness, warmth, swelling, drainage from the wound, or foul odor from a cast or splint  · You cant move your toes  · The skin around the cast becomes red  · Fever of 101ºF (38.3ºC) or higher, or as directed by your health care provider  Date Last Reviewed: 2/15/2015  © 5032-4765 [employer]. [address] [place] [address]. All rights reserved. This information is not intended as a substitute for professional medical care. Always follow your healthcare professional's instructions.      Please follow up with your Primary care provider within 2-5 days if your signs and symptoms have not resolved or worsen.     If your condition worsens or fails to improve we recommend that you receive another evaluation at the emergency room immediately or contact your primary medical clinic to discuss your concerns.   You must understand that you have received an Urgent Care treatment only and that you may be released before all of your medical problems are known or treated. You, the patient, will arrange for follow up care as  instructed.

## 2018-01-28 ENCOUNTER — HOSPITAL ENCOUNTER (EMERGENCY)
Facility: HOSPITAL | Age: 53
Discharge: HOME | End: 2018-01-28
Attending: EMERGENCY MEDICINE
Payer: MEDICAID

## 2018-01-28 VITALS
SYSTOLIC BLOOD PRESSURE: 176 MMHG | RESPIRATION RATE: 18 BRPM | HEIGHT: 67 IN | HEART RATE: 97 BPM | DIASTOLIC BLOOD PRESSURE: 103 MMHG | OXYGEN SATURATION: 100 % | BODY MASS INDEX: 23.54 KG/M2 | WEIGHT: 150 LBS | TEMPERATURE: 99 F
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
## 2018-01-28 DIAGNOSIS — L03.113 CELLULITIS OF ARM, RIGHT: ICD-10-CM

## 2018-01-28 PROCEDURE — 25000003 PHARM REV CODE 250: Performed by: NURSE PRACTITIONER

## 2018-01-28 PROCEDURE — 99283 EMERGENCY DEPT VISIT LOW MDM: CPT

## 2018-01-28 RX ORDER — DOXYCYCLINE 100 MG/1
100 CAPSULE ORAL 2 TIMES DAILY
Qty: 20 CAPSULE | Refills: 0 | Status: SHIPPED | OUTPATIENT
Start: 2018-01-28 | End: 2018-02-07

## 2018-01-28 RX ORDER — HYDROCODONE BITARTRATE AND ACETAMINOPHEN 5; 325 MG/1; MG/1
1 TABLET ORAL EVERY 6 HOURS PRN
Qty: 12 TABLET | Refills: 0 | Status: SHIPPED | OUTPATIENT
Start: 2018-01-28 | End: 2018-12-31

## 2018-01-28 RX ORDER — DOXYCYCLINE HYCLATE 100 MG
100 TABLET ORAL
Status: COMPLETED | OUTPATIENT
Start: 2018-01-28 | End: 2018-01-28

## 2018-01-28 RX ADMIN — DOXYCYCLINE HYCLATE 100 MG: 100 TABLET, COATED ORAL at 05:01

## 2018-01-28 NOTE — ED PROVIDER NOTES
"Encounter Date: 1/28/2018       History     Chief Complaint   Patient presents with    Insect Bite     "I think its a spider bite, i felt the pain and saw the bump on my arm." Possible spider bit that happened on yesterday, painful at site 4/10, denies drainage at site; no swelling noted     CC: Insect Bite    HPI: Chris De La Paz, a 52 y.o. female that presents to the ED for an insect bite to the right upper arm that she noticed last night.  She reports the pain is constant, gradually worsening, worse with movement and palpation.  She reports feeling a pain last night in the arm and a wound on the arm.  She did not see anything bite her.  She attempted treatment with topical antibiotic ointment and one amoxicillin that she had left from a previous infection.  She reports no drainage, pain in the elbow or shoulder, fevers, or arm weakness.        The history is provided by the patient. No  was used.     Review of patient's allergies indicates:   Allergen Reactions    Ace inhibitors Other (See Comments)     Angioedema       Past Medical History:   Diagnosis Date    Hypertension     Stroke      Past Surgical History:   Procedure Laterality Date    TUBAL LIGATION       Family History   Problem Relation Age of Onset    Hypertension Mother     Hypotension Father      Social History   Substance Use Topics    Smoking status: Former Smoker     Packs/day: 0.50     Types: Cigarettes     Quit date: 11/11/2017    Smokeless tobacco: Never Used    Alcohol use Yes      Comment: socially     Review of Systems   Constitutional: Negative for fever.   HENT: Negative for trouble swallowing.    Respiratory: Negative for shortness of breath.    Gastrointestinal: Negative for vomiting.   Musculoskeletal: Positive for myalgias (right arm near wound). Negative for arthralgias, joint swelling, neck pain and neck stiffness.   Skin: Positive for wound (right arm).   Neurological: Negative for syncope and headaches. "   Psychiatric/Behavioral: Negative for confusion.       Physical Exam     Initial Vitals   BP Pulse Resp Temp SpO2   01/28/18 1730 01/28/18 1504 01/28/18 1504 01/28/18 1504 01/28/18 1504   (!) 176/103 103 18 98.9 °F (37.2 °C) 100 %      MAP       01/28/18 1730       127.33         Physical Exam    Nursing note and vitals reviewed.  Constitutional: She appears well-developed and well-nourished. She is not diaphoretic. She is cooperative.  Non-toxic appearance. No distress.   HENT:   Head: Normocephalic and atraumatic.   Right Ear: External ear normal.   Left Ear: External ear normal.   Eyes: Conjunctivae and EOM are normal.   Neck: Normal range of motion. No tracheal deviation present.   Cardiovascular: Normal rate and regular rhythm.   Pulmonary/Chest: Effort normal and breath sounds normal. No stridor. No tachypnea and no bradypnea. No respiratory distress. She has no wheezes. She has no rhonchi. She has no rales. She exhibits no tenderness.   Musculoskeletal:        Right shoulder: Normal. She exhibits normal range of motion and no pain.        Right elbow: Normal.She exhibits normal range of motion, no swelling, no effusion, no deformity and no laceration. No tenderness found.        Right upper arm: She exhibits tenderness (over indurated area).        Arms:  Neurological: She is alert and oriented to person, place, and time. She has normal strength. Coordination normal. GCS eye subscore is 4. GCS verbal subscore is 5. GCS motor subscore is 6.   Skin: Skin is warm, dry and intact. Capillary refill takes less than 2 seconds. No rash noted. There is erythema (mild erythema over indurated area on right mid arm). No cyanosis. Nails show no clubbing.   Psychiatric: She has a normal mood and affect. Her behavior is normal. Judgment and thought content normal.         ED Course   Procedures  Labs Reviewed - No data to display                APC / Resident Notes:   This is an evaluation of a 52 y.o. female that presents  to the Emergency Department for a wound to the right upper arm. Physical Exam shows a non-toxic, afebrile, and well appearing female.  There is a 3 cm linear area of induration with no fluctuance and mild surrounding erythema.  There is a punctate superficial scabbed wound to the distal end of this linear area of induration.  There is no fluctuance.  There is no active drainage.  There is no tenderness or decreased range of motion of the right shoulder right elbow.  Bedside ultrasound was performed with no abscess pocket of fluid collection identified.  Humeral compartment are soft.  Vital Signs Are Reassuring. If available, previous records reviewed.     My overall impression is cellulitis secondary to possible insect bite. I considered, but at this time, do not suspect abscess, fracture, dislocation, septic joint, compartment syndrome, or extensive cellulitis requiring IV antibiotics.    ED Course: Doxycycline. D/C Meds: Doxycycline and Norco. D/C Information: Warm compresses, monitor for worsening signs or symptoms. The diagnosis, treatment plan, instructions for follow-up and reevaluation with PCP as well as ED return precautions were discussed and understanding was verbalized. All questions or concerns have been addressed. This case was discussed with Dr. Grey who is in agreement with my assessment and plan. BLAIR Luong, FNP-C                 ED Course      Clinical Impression:   The primary encounter diagnosis was Insect bite, initial encounter. A diagnosis of Cellulitis of arm, right was also pertinent to this visit.    Disposition:   Disposition: Discharged  Condition: Stable                        Kerwin Bonilla, LORAINE  01/28/18 1815       LORAINE Stokes  01/28/18 1816

## 2018-01-30 ENCOUNTER — HOSPITAL ENCOUNTER (EMERGENCY)
Facility: HOSPITAL | Age: 53
Discharge: HOME | End: 2018-01-30
Attending: EMERGENCY MEDICINE
Payer: MEDICAID

## 2018-01-30 VITALS
BODY MASS INDEX: 24.33 KG/M2 | HEIGHT: 67 IN | WEIGHT: 155 LBS | OXYGEN SATURATION: 98 % | RESPIRATION RATE: 18 BRPM | HEART RATE: 93 BPM | SYSTOLIC BLOOD PRESSURE: 169 MMHG | DIASTOLIC BLOOD PRESSURE: 84 MMHG | TEMPERATURE: 99 F

## 2018-01-30 DIAGNOSIS — L03.90 CELLULITIS, UNSPECIFIED CELLULITIS SITE: ICD-10-CM
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
## 2018-01-30 PROCEDURE — 99283 EMERGENCY DEPT VISIT LOW MDM: CPT

## 2018-01-30 RX ORDER — CEPHALEXIN 500 MG/1
500 CAPSULE ORAL 4 TIMES DAILY
Qty: 28 CAPSULE | Refills: 0 | Status: SHIPPED | OUTPATIENT
Start: 2018-01-30 | End: 2018-02-06

## 2018-01-30 NOTE — ED PROVIDER NOTES
Encounter Date: 1/30/2018    SCRIBE #1 NOTE: I, Tish Yen, am scribing for, and in the presence of,  Hoda Garza PA-C. I have scribed the following portions of the note - Other sections scribed: ROS and HPI.       History     Chief Complaint   Patient presents with    Insect Bite     Insect bite to right upper arm.  Seen two days ago, but area of bite is getting harder and larger.       CC: Insect Bite    HPI: This 52 y.o. female with a past medical history of Hypertension and Stroke, presents to the ED complaining of gradually worsening swelling and pain to her R upper arm secondary to an insect bite that occurred 3 days ago. She was seen for her sx day before yesterday. She has been taking prescribed Doxycycline and Norco. She notes area is more harder now and has associated warmth to it. She denies any drainage from the area. She denies fever, N/V, SOB, lightheadedness or any other associated sx.      The history is provided by the patient. No  was used.     Review of patient's allergies indicates:   Allergen Reactions    Ace inhibitors Other (See Comments)     Angioedema       Past Medical History:   Diagnosis Date    Hypertension     Stroke      Past Surgical History:   Procedure Laterality Date    TUBAL LIGATION       Family History   Problem Relation Age of Onset    Hypertension Mother     Hypotension Father      Social History   Substance Use Topics    Smoking status: Former Smoker     Packs/day: 0.50     Types: Cigarettes     Quit date: 11/11/2017    Smokeless tobacco: Never Used    Alcohol use Yes      Comment: socially     Review of Systems   Constitutional: Negative for fever.   HENT: Negative for congestion.    Eyes: Negative for pain.   Respiratory: Negative for cough.    Gastrointestinal: Negative for nausea and vomiting.   Endocrine: Negative for polyuria.   Genitourinary: Negative for dysuria.   Musculoskeletal: Negative for arthralgias.   Skin: Positive for color  change (R upper arm) and rash.   Neurological: Negative for numbness.   Psychiatric/Behavioral: Negative for agitation.   All other systems reviewed and are negative.      Physical Exam     Initial Vitals   BP Pulse Resp Temp SpO2   01/30/18 1245 01/30/18 1244 01/30/18 1244 01/30/18 1244 01/30/18 1244   (!) 163/90 90 18 98.4 °F (36.9 °C) 98 %      MAP       01/30/18 1245       114.33         Physical Exam    Nursing note and vitals reviewed.  Constitutional: She appears well-developed and well-nourished. She is not diaphoretic. No distress.   HENT:   Head: Normocephalic and atraumatic.   Nose: Nose normal.   Eyes: EOM are normal. Pupils are equal, round, and reactive to light.   Neck: Normal range of motion. Neck supple.   Cardiovascular: Normal rate and regular rhythm.   No murmur heard.  Pulmonary/Chest: Breath sounds normal. No respiratory distress. She has no wheezes. She has no rhonchi. She has no rales.   Abdominal: Soft. Bowel sounds are normal. She exhibits no distension. There is no tenderness. There is no rebound and no guarding.   Musculoskeletal: Normal range of motion. She exhibits no edema or tenderness.   Neurological: She is alert and oriented to person, place, and time. No cranial nerve deficit.   Skin: Skin is warm. Capillary refill takes less than 2 seconds. No erythema.              ED Course   Procedures  Labs Reviewed - No data to display          Medical Decision Making:   Differential Diagnosis:   This is an urgent evaluation of a 52-year-old female who presents to the emergency department complaining of swelling and pain to her right upper arm.      Previous medical records were obtained and reviewed.  This patient was seen 2 days ago for an insect bite to the right arm.  She was prescribed doxycycline.    The patient is currently afebrile and nontoxic in appearance.  Her vital signs are stable.  On physical exam, there is an area of erythema and warmth noted to the right AC.  There is no  induration noted.  No drainage.  There is a small punctum from an insect bite or sting noted.  The remaining physical exam is unremarkable.  There is no obvious abscess to drain.  I believe this patient has cellulitis from an insect bite or sting.  I will add Keflex to her antibiotic regimen.  The area of erythema was delineated using a surgical marker.  Signs and symptoms of worsening were thoroughly reviewed with her and she verbalized understanding and agreement.  This patient is currently safe and stable for discharge at this time.  This case was discussed with Dr. Hyde and he is in agreement with the assessment and treatment plan.            Scribe Attestation:   Scribe #1: I performed the above scribed service and the documentation accurately describes the services I performed. I attest to the accuracy of the note.    Attending Attestation:           Physician Attestation for Scribe:  Physician Attestation Statement for Scribe #1: I, Hoda Garza PA-C, reviewed documentation, as scribed by Tish Yen in my presence, and it is both accurate and complete.                 ED Course      Clinical Impression:   The primary encounter diagnosis was Insect bite, initial encounter. A diagnosis of Cellulitis, unspecified cellulitis site was also pertinent to this visit.    Disposition:   Disposition: Discharged  Condition: Stable                        Hoda Garza PA-C  01/30/18 1406

## 2018-01-30 NOTE — ED TRIAGE NOTES
Pt. Reports that she was 2 days ago for an insect bite. Pt. Reports that the bite to her right arm is getting bigger.  Noted to pt. Right upper arm is redness.

## 2018-01-30 NOTE — DISCHARGE INSTRUCTIONS
Follow up with your doctor.  Continue taking Doxycycline with the Keflex.  Apply warm compresses.  Return to the ED for any increase in redness beyond the marker, fever, or any other changing symptoms.

## 2018-07-08 ENCOUNTER — HOSPITAL ENCOUNTER (EMERGENCY)
Facility: HOSPITAL | Age: 53
Discharge: HOME | End: 2018-07-08
Attending: EMERGENCY MEDICINE
Payer: MEDICAID

## 2018-07-08 VITALS
RESPIRATION RATE: 16 BRPM | BODY MASS INDEX: 23.54 KG/M2 | HEART RATE: 98 BPM | OXYGEN SATURATION: 100 % | TEMPERATURE: 100 F | HEIGHT: 67 IN | DIASTOLIC BLOOD PRESSURE: 74 MMHG | SYSTOLIC BLOOD PRESSURE: 160 MMHG | WEIGHT: 150 LBS

## 2018-07-08 DIAGNOSIS — J06.9 VIRAL URI WITH COUGH: Primary | ICD-10-CM

## 2018-07-08 PROCEDURE — 99283 EMERGENCY DEPT VISIT LOW MDM: CPT

## 2018-07-08 RX ORDER — BENZONATATE 100 MG/1
100 CAPSULE ORAL 3 TIMES DAILY PRN
Qty: 15 CAPSULE | Refills: 0 | Status: SHIPPED | OUTPATIENT
Start: 2018-07-08 | End: 2018-07-18

## 2018-07-09 NOTE — ED PROVIDER NOTES
Encounter Date: 7/8/2018    SCRIBE #1 NOTE: IMickey, am scribing for, and in the presence of,  Elza Hebert PA-C. I have scribed the following portions of the note - Other sections scribed: HPI, ROS.       History     Chief Complaint   Patient presents with    Nasal Congestion     nasal congestion, sore throat, non-productive cough since yesterday. Denies fever or SOB     CC: Nasal Congestion    HPI: This 52 y.o. Female with HTN and stroke presents to the ED c/o headache, sore throat, dry cough, nasal congestion, generalized body aches, facial pressure and rhinorrhea which began yesterday. Pt admits to smoking x1 pack of cigarettes per week. She took doxycycline (2 doses) and flucticasone with slight relief. Pt denies abdominal pain, ear pain or fever.      The history is provided by the patient. No  was used.     Review of patient's allergies indicates:   Allergen Reactions    Ace inhibitors Other (See Comments)     Angioedema       Past Medical History:   Diagnosis Date    Hypertension     Stroke      Past Surgical History:   Procedure Laterality Date    TUBAL LIGATION       Family History   Problem Relation Age of Onset    Hypertension Mother     Hypotension Father      Social History   Substance Use Topics    Smoking status: Former Smoker     Packs/day: 0.50     Types: Cigarettes     Quit date: 11/11/2017    Smokeless tobacco: Never Used    Alcohol use Yes      Comment: socially     Review of Systems   Constitutional: Negative for chills, diaphoresis, fatigue and fever.   HENT: Positive for congestion (nasal), rhinorrhea, sinus pressure and sore throat. Negative for ear pain, sinus pain, sneezing, tinnitus and trouble swallowing.         (+) facial pressure   Eyes: Negative for redness.   Respiratory: Positive for cough (dry). Negative for shortness of breath.    Cardiovascular: Negative for chest pain.   Gastrointestinal: Negative for abdominal pain, diarrhea, nausea  and vomiting.   Genitourinary: Negative for dysuria and hematuria.   Musculoskeletal: Negative for back pain and neck pain.        (+) generalized body aches   Skin: Negative for rash.   Neurological: Negative for weakness, numbness and headaches.   Hematological: Does not bruise/bleed easily.   Psychiatric/Behavioral: Negative for confusion. The patient is not nervous/anxious.        Physical Exam     Initial Vitals [07/08/18 2102]   BP Pulse Resp Temp SpO2   (!) 173/85 108 16 99.8 °F (37.7 °C) 99 %      MAP       --         Physical Exam    Nursing note and vitals reviewed.  Constitutional: Vital signs are normal. She appears well-developed and well-nourished. She is not diaphoretic. She is cooperative.  Non-toxic appearance. She does not have a sickly appearance. She does not appear ill. No distress.   HENT:   Head: Normocephalic and atraumatic.   Right Ear: Tympanic membrane, external ear and ear canal normal.   Left Ear: Tympanic membrane, external ear and ear canal normal.   Nose: No mucosal edema or rhinorrhea. Right sinus exhibits maxillary sinus tenderness. Right sinus exhibits no frontal sinus tenderness. Left sinus exhibits maxillary sinus tenderness. Left sinus exhibits no frontal sinus tenderness.   Mouth/Throat: Uvula is midline, oropharynx is clear and moist and mucous membranes are normal. No trismus in the jaw. No uvula swelling. No oropharyngeal exudate, posterior oropharyngeal edema, posterior oropharyngeal erythema or tonsillar abscesses.   Eyes: Conjunctivae, EOM and lids are normal. Pupils are equal, round, and reactive to light.   Neck: Trachea normal, normal range of motion, full passive range of motion without pain and phonation normal. Neck supple.   Cardiovascular: Normal rate, regular rhythm, normal heart sounds and intact distal pulses. Exam reveals no gallop and no friction rub.    No murmur heard.  Pulmonary/Chest: Effort normal and breath sounds normal. No respiratory distress. She  "has no decreased breath sounds. She has no wheezes. She has no rhonchi. She has no rales.   Abdominal: Soft. Normal appearance and bowel sounds are normal. She exhibits no distension and no mass. There is no tenderness. There is no rigidity, no rebound, no guarding and no CVA tenderness.   Musculoskeletal: Normal range of motion.   Lymphadenopathy:     She has no cervical adenopathy.   Neurological: She is alert and oriented to person, place, and time. She has normal strength.   Skin: Skin is warm and dry. Capillary refill takes less than 2 seconds. No rash noted.   Psychiatric: She has a normal mood and affect. Her speech is normal and behavior is normal. Judgment and thought content normal. Cognition and memory are normal.         ED Course   Procedures  Labs Reviewed - No data to display       Imaging Results    None                APC / Resident Notes:   This is an evaluation of a 52 y.o. female that presents to the Emergency Department for nasal congestion.  Patient reports nasal congestion, sinus pressure, sore throat, nonproductive cough and generalized body aches that began yesterday. She denies rhinorrhea or green mucous. She reports taking "old" doxycycline (2 doses, first yesterday and second today) and fluticasone nasal spray with mild relief. She denies SOB, difficulty breathing, chest pain, abdominal pain, N/V/D/C or further complaints.    Physical Exam shows a non-toxic, afebrile, and well appearing female.  Normocephalic and atraumatic.  PERRLA.  EOMI. Nares patent, no rhinorrhea.  There is maxillary tenderness to palpation. No frontal sinus tenderness to palpation.  Posterior oropharynx is clear.  TMs clear bilaterally. No cervical adenopathy.  Neck is supple.  Lungs clear to auscultation bilaterally, no wheezing, rales or rhonchi.  No evidence of acute respiratory distress.  Regular rate and rhythm, no murmurs.  Abdomen is soft and nontender to palpation.  Bowel sounds are appreciated. No " peritoneal signs. No rashes.    Vital Signs Are Reassuring. If available, previous records reviewed.     My overall impression is viral URI with cough.  DDx: nasal congestion, viral URI, cough  I do not suspect emergent process at this time.    ED Course:  I feel this patient is stable for discharge. D/C Meds:  Tessalon Perles. Additional D/C Information:  I will recommend symptomatic treatment with DayQuil, NyQuil, cough drops. The diagnosis, treatment plan, instructions for follow-up and reevaluation with PCP, as well as ED return precautions were discussed and understanding was verbalized. All questions or concerns have been addressed. Patient was discharged home with an instructional sheet which gave not only information regarding the most likely diagnoses but also information regarding when to return to the emergency department for alarming symptoms and when to seek further care.      This case was discussed with Dr. Quintero who is in agreement with my assessment and plan.     Elza Hebert PA-C         Scribe Attestation:   Scribe #1: I performed the above scribed service and the documentation accurately describes the services I performed. I attest to the accuracy of the note.    Attending Attestation:           Physician Attestation for Scribe:  Physician Attestation Statement for Scribe #1: I, Elza Hebert PA-C, reviewed documentation, as scribed by Mickey Parker in my presence, and it is both accurate and complete.                    Clinical Impression:   The encounter diagnosis was Viral URI with cough.      Disposition:   Disposition: Discharged  Condition: Stable                        Elza Hebert PA-C  07/09/18 0251

## 2018-07-09 NOTE — DISCHARGE INSTRUCTIONS
Make sure you are getting rest and drinking lots of fluids.    You can take the prescribed Tessalon Perles up to 3 times daily as needed for cough.    You can treat your symptoms with DayQuil maximum strength, NyQuil, Cepacol and/or cough drops.  You can also take Emergen-C to help boost your immune system.    Please schedule a follow-up appointment with your primary care provider.    If for some reason your symptoms worsen or for any new or concerning symptoms, please return to this emergency room.

## 2018-07-09 NOTE — ED TRIAGE NOTES
"Pt presents to ED c/o nasal congestion, sore throat and headache since yesterday. "My face is stopped up, my throat is sore, I got a headache." Also reports generalized body aches.        "

## 2018-07-19 DIAGNOSIS — Z12.39 BREAST CANCER SCREENING: ICD-10-CM

## 2018-12-31 ENCOUNTER — HOSPITAL ENCOUNTER (EMERGENCY)
Facility: HOSPITAL | Age: 53
Discharge: HOME | End: 2018-12-31
Attending: EMERGENCY MEDICINE
Payer: MEDICAID

## 2018-12-31 VITALS
HEIGHT: 66 IN | SYSTOLIC BLOOD PRESSURE: 126 MMHG | DIASTOLIC BLOOD PRESSURE: 69 MMHG | BODY MASS INDEX: 24.11 KG/M2 | HEART RATE: 96 BPM | RESPIRATION RATE: 18 BRPM | TEMPERATURE: 98 F | WEIGHT: 150 LBS | OXYGEN SATURATION: 98 %

## 2018-12-31 DIAGNOSIS — M54.9 ACUTE RIGHT-SIDED BACK PAIN, UNSPECIFIED BACK LOCATION: Primary | ICD-10-CM

## 2018-12-31 LAB
B-HCG UR QL: NEGATIVE
BACTERIA #/AREA URNS AUTO: ABNORMAL /HPF
BILIRUB UR QL STRIP: NEGATIVE
CLARITY UR REFRACT.AUTO: CLEAR
COLOR UR AUTO: YELLOW
CTP QC/QA: YES
GLUCOSE UR QL STRIP: NEGATIVE
HGB UR QL STRIP: NEGATIVE
KETONES UR QL STRIP: ABNORMAL
LEUKOCYTE ESTERASE UR QL STRIP: ABNORMAL
MICROSCOPIC COMMENT: ABNORMAL
NITRITE UR QL STRIP: NEGATIVE
PH UR STRIP: 5 [PH] (ref 5–8)
PROT UR QL STRIP: NEGATIVE
RBC #/AREA URNS AUTO: 1 /HPF (ref 0–4)
SP GR UR STRIP: 1.03 (ref 1–1.03)
SQUAMOUS #/AREA URNS AUTO: 4 /HPF
URN SPEC COLLECT METH UR: ABNORMAL
WBC #/AREA URNS AUTO: 7 /HPF (ref 0–5)

## 2018-12-31 PROCEDURE — 99284 EMERGENCY DEPT VISIT MOD MDM: CPT | Mod: ,,, | Performed by: PHYSICIAN ASSISTANT

## 2018-12-31 PROCEDURE — 25000003 PHARM REV CODE 250: Performed by: PHYSICIAN ASSISTANT

## 2018-12-31 PROCEDURE — 81025 URINE PREGNANCY TEST: CPT | Performed by: EMERGENCY MEDICINE

## 2018-12-31 PROCEDURE — 99284 EMERGENCY DEPT VISIT MOD MDM: CPT

## 2018-12-31 PROCEDURE — 81001 URINALYSIS AUTO W/SCOPE: CPT

## 2018-12-31 PROCEDURE — 63600175 PHARM REV CODE 636 W HCPCS: Performed by: PHYSICIAN ASSISTANT

## 2018-12-31 PROCEDURE — 99284 PR EMERGENCY DEPT VISIT,LEVEL IV: ICD-10-PCS | Mod: ,,, | Performed by: PHYSICIAN ASSISTANT

## 2018-12-31 PROCEDURE — 96372 THER/PROPH/DIAG INJ SC/IM: CPT

## 2018-12-31 RX ORDER — TRAMADOL HYDROCHLORIDE 50 MG/1
50 TABLET ORAL EVERY 6 HOURS
COMMUNITY
End: 2024-02-01 | Stop reason: ALTCHOICE

## 2018-12-31 RX ORDER — LIDOCAINE 50 MG/G
1 PATCH TOPICAL DAILY
Qty: 15 PATCH | Refills: 0 | OUTPATIENT
Start: 2018-12-31 | End: 2019-12-22

## 2018-12-31 RX ORDER — KETOROLAC TROMETHAMINE 30 MG/ML
15 INJECTION, SOLUTION INTRAMUSCULAR; INTRAVENOUS
Status: COMPLETED | OUTPATIENT
Start: 2018-12-31 | End: 2018-12-31

## 2018-12-31 RX ORDER — CYCLOBENZAPRINE HCL 10 MG
10 TABLET ORAL 3 TIMES DAILY PRN
COMMUNITY
End: 2018-12-31 | Stop reason: ALTCHOICE

## 2018-12-31 RX ORDER — NAPROXEN 500 MG/1
500 TABLET ORAL 2 TIMES DAILY WITH MEALS
Qty: 30 TABLET | Refills: 0 | OUTPATIENT
Start: 2018-12-31 | End: 2021-08-02

## 2018-12-31 RX ORDER — METHOCARBAMOL 750 MG/1
750 TABLET, FILM COATED ORAL 3 TIMES DAILY PRN
Qty: 20 TABLET | Refills: 0 | Status: SHIPPED | OUTPATIENT
Start: 2018-12-31 | End: 2019-01-07

## 2018-12-31 RX ORDER — LIDOCAINE 50 MG/G
1 PATCH TOPICAL
Status: DISCONTINUED | OUTPATIENT
Start: 2018-12-31 | End: 2018-12-31 | Stop reason: HOSPADM

## 2018-12-31 RX ADMIN — LIDOCAINE 1 PATCH: 50 PATCH TOPICAL at 04:12

## 2018-12-31 RX ADMIN — KETOROLAC TROMETHAMINE 15 MG: 30 INJECTION, SOLUTION INTRAMUSCULAR at 04:12

## 2018-12-31 NOTE — ED PROVIDER NOTES
Encounter Date: 2018       History     Chief Complaint   Patient presents with    Back Pain     mid back pain on r side for 1 week     53-year-old female with hypertension and history of CVA who presents for right-sided mid back pain x1 week.  Patient reports that she was reaching and pulling to reach some clothes and felt a pulling, aching pain in her back.  Patient took some leftover Flexeril and tramadol that she had at home with temporary relief.  Denies urinary symptoms, bowel or bladder incontinence, fevers, numbness, saddle anesthesia, weakness or gait disturbance.  No history of cancer, denies IVDU.          Review of patient's allergies indicates:   Allergen Reactions    Ace inhibitors Other (See Comments)     Angioedema       Past Medical History:   Diagnosis Date    Hypertension     Stroke      Past Surgical History:   Procedure Laterality Date    TUBAL LIGATION       Family History   Problem Relation Age of Onset    Hypertension Mother     Hypotension Father      Social History     Tobacco Use    Smoking status: Former Smoker     Packs/day: 0.50     Types: Cigarettes     Last attempt to quit: 2017     Years since quittin.1    Smokeless tobacco: Never Used   Substance Use Topics    Alcohol use: Yes     Comment: socially    Drug use: No     Review of Systems   Constitutional: Negative for chills, fatigue and fever.   Respiratory: Negative for cough and shortness of breath.    Cardiovascular: Negative for chest pain and palpitations.   Gastrointestinal: Negative for abdominal pain, constipation, diarrhea, nausea and vomiting.   Endocrine: Negative for polyuria.   Genitourinary: Positive for flank pain. Negative for difficulty urinating, dysuria, enuresis, hematuria and urgency.   Musculoskeletal: Positive for back pain. Negative for arthralgias and gait problem.   Skin: Negative for color change and pallor.   Allergic/Immunologic: Negative for immunocompromised state.    Neurological: Negative for weakness, light-headedness, numbness and headaches.       Physical Exam     Initial Vitals [12/31/18 1441]   BP Pulse Resp Temp SpO2   126/69 96 18 98 °F (36.7 °C) 98 %      MAP       --         Physical Exam    Nursing note and vitals reviewed.  Constitutional: Vital signs are normal. She appears well-developed and well-nourished. She is not diaphoretic.  Non-toxic appearance. No distress.   HENT:   Head: Normocephalic and atraumatic.   Eyes: EOM are normal. Pupils are equal, round, and reactive to light.   Neck: Normal range of motion. Neck supple.   Cardiovascular: Normal rate, regular rhythm, normal heart sounds and intact distal pulses. Exam reveals no gallop and no friction rub.    No murmur heard.  Pulmonary/Chest: Breath sounds normal. No respiratory distress. She has no wheezes. She has no rhonchi. She has no rales. She exhibits no tenderness.   Abdominal: Soft. Bowel sounds are normal. She exhibits no distension and no mass. There is no tenderness. There is CVA tenderness. There is no rigidity, no rebound, no guarding, no tenderness at McBurney's point and negative Delaney's sign.   Musculoskeletal: Normal range of motion. She exhibits tenderness. She exhibits no edema.        Cervical back: Normal.        Thoracic back: Normal.        Lumbar back: She exhibits tenderness. She exhibits normal range of motion, no bony tenderness, no swelling, no edema, no deformity, no laceration, no pain and no spasm.   Neurological: She is alert and oriented to person, place, and time. She has normal strength. No sensory deficit.   Skin: Skin is warm and dry.   Psychiatric: She has a normal mood and affect.         ED Course   Procedures  Labs Reviewed   URINALYSIS, REFLEX TO URINE CULTURE - Abnormal; Notable for the following components:       Result Value    Ketones, UA Trace (*)     Leukocytes, UA Trace (*)     All other components within normal limits    Narrative:     Preferred Collection  Type->Urine, Clean Catch   URINALYSIS MICROSCOPIC - Abnormal; Notable for the following components:    WBC, UA 7 (*)     Bacteria, UA Few (*)     All other components within normal limits    Narrative:     Preferred Collection Type->Urine, Clean Catch   POCT URINE PREGNANCY          Imaging Results    None          Medical Decision Making:   History:   Old Medical Records: I decided to obtain old medical records.  Clinical Tests:   Lab Tests: Ordered and Reviewed  Other:   I have discussed this case with another health care provider.       APC / Resident Notes:   53-year-old female presenting for right-sided mid back pain x1 week.  No red flag symptoms. Normal vitals, tender to palpation over the right flank. DDX includes muscle strain, muscle spasm, the nephritis.  Will check UA, give Toradol, lidocaine patch and reassess.    UA shows 7 WBCs, few bacteria, 4 squamous epithelial cells.  Suspect due to contamination rather than UTI.  Will not treat for UTI at this time, will follow up culture.  Suspect pain due to muscle strain.  Discharged with Rx for naproxen, Robaxin, lidocaine patches. Stressed the importance of follow-up, strict ED return precautions given.  Patient voiced understanding and is comfortable with discharge. I discussed this patient with my supervising physician.    Marisol Rm PA-C                   Clinical Impression:   The encounter diagnosis was Acute right-sided back pain, unspecified back location.      Disposition:   Disposition: Discharged  Condition: Stable                        Marisol Rm PA-C  12/31/18 [id number]

## 2018-12-31 NOTE — ED TRIAGE NOTES
Presents to ER with complaint of pain to her mid back for 7 days.  Patient's name and date of birth checked and is correct.    LOC: The patient is awake, alert, and oriented to place, time, situation. Affect is appropriate.  Speech is appropriate and clear.      APPEARANCE: Patient resting comfortably, reporting palpation, light headedness,  in no acute distress.  Patient is clean and well groomed.     SKIN: The skin is warm and dry; color consistent with ethnicity.  Patient has normal skin turgor and moist mucus membranes.  Skin intact; no breakdown or bruising noted.      MUSCULOSKELETAL: Patient moving upper and lower extremities without difficulty.  Denies weakness.      RESPIRATORY: Airway is open and patent. Respirations spontaneous, even, easy, and non-labored.  Patient has a normal effort and rate.  No accessory muscle use noted. Denies cough.  BS clear.     CARDIAC:  No peripheral edema noted. No complaints of chest pain.       ABDOMEN: Soft and non tender to palpation.  No distention noted.      NEUROLOGIC: Eyes open spontaneously.  Behavior appropriate to situation.  Follows commands; facial expression symmetrical.  Purposeful motor response noted; normal sensation in all extremities.

## 2018-12-31 NOTE — DISCHARGE INSTRUCTIONS
Please schedule an appointment with your primary care doctor for follow-up. If you start to have any new or worsening symptoms, please come back to the emergency department.

## 2019-05-29 DIAGNOSIS — Z12.11 COLON CANCER SCREENING: ICD-10-CM

## 2019-09-20 DIAGNOSIS — Z12.39 BREAST CANCER SCREENING: ICD-10-CM

## 2019-11-04 ENCOUNTER — TELEPHONE (OUTPATIENT)
Dept: FAMILY MEDICINE | Facility: CLINIC | Age: 54
End: 2019-11-04

## 2019-12-22 ENCOUNTER — HOSPITAL ENCOUNTER (EMERGENCY)
Facility: HOSPITAL | Age: 54
Discharge: HOME | End: 2019-12-22
Attending: EMERGENCY MEDICINE

## 2019-12-22 VITALS
OXYGEN SATURATION: 100 % | HEIGHT: 66 IN | HEART RATE: 80 BPM | DIASTOLIC BLOOD PRESSURE: 89 MMHG | BODY MASS INDEX: 22.5 KG/M2 | TEMPERATURE: 98 F | RESPIRATION RATE: 18 BRPM | WEIGHT: 140 LBS | SYSTOLIC BLOOD PRESSURE: 159 MMHG

## 2019-12-22 DIAGNOSIS — M54.50 LUMBAR BACK PAIN: Primary | ICD-10-CM

## 2019-12-22 DIAGNOSIS — M25.569 KNEE PAIN: ICD-10-CM

## 2019-12-22 DIAGNOSIS — M17.10 ARTHRITIS OF KNEE: ICD-10-CM

## 2019-12-22 PROCEDURE — 25000003 PHARM REV CODE 250: Performed by: PHYSICIAN ASSISTANT

## 2019-12-22 PROCEDURE — 63600175 PHARM REV CODE 636 W HCPCS: Performed by: PHYSICIAN ASSISTANT

## 2019-12-22 PROCEDURE — 99284 EMERGENCY DEPT VISIT MOD MDM: CPT | Mod: 25

## 2019-12-22 PROCEDURE — 96372 THER/PROPH/DIAG INJ SC/IM: CPT

## 2019-12-22 RX ORDER — LIDOCAINE 50 MG/G
1 PATCH TOPICAL DAILY
Qty: 15 PATCH | Refills: 0 | Status: SHIPPED | OUTPATIENT
Start: 2019-12-22 | End: 2020-01-06

## 2019-12-22 RX ORDER — CYCLOBENZAPRINE HCL 10 MG
10 TABLET ORAL 3 TIMES DAILY PRN
Qty: 20 TABLET | Refills: 0 | Status: SHIPPED | OUTPATIENT
Start: 2019-12-22 | End: 2019-12-29

## 2019-12-22 RX ORDER — LIDOCAINE 50 MG/G
1 PATCH TOPICAL
Status: DISCONTINUED | OUTPATIENT
Start: 2019-12-22 | End: 2019-12-22 | Stop reason: HOSPADM

## 2019-12-22 RX ORDER — ACETAMINOPHEN 500 MG
500 TABLET ORAL
Status: COMPLETED | OUTPATIENT
Start: 2019-12-22 | End: 2019-12-22

## 2019-12-22 RX ORDER — BETAMETHASONE SODIUM PHOSPHATE AND BETAMETHASONE ACETATE 3; 3 MG/ML; MG/ML
6 INJECTION, SUSPENSION INTRA-ARTICULAR; INTRALESIONAL; INTRAMUSCULAR; SOFT TISSUE
Status: COMPLETED | OUTPATIENT
Start: 2019-12-22 | End: 2019-12-22

## 2019-12-22 RX ORDER — CYCLOBENZAPRINE HCL 10 MG
10 TABLET ORAL
Status: COMPLETED | OUTPATIENT
Start: 2019-12-22 | End: 2019-12-22

## 2019-12-22 RX ORDER — DEXAMETHASONE SODIUM PHOSPHATE 4 MG/ML
8 INJECTION, SOLUTION INTRA-ARTICULAR; INTRALESIONAL; INTRAMUSCULAR; INTRAVENOUS; SOFT TISSUE
Status: DISCONTINUED | OUTPATIENT
Start: 2019-12-22 | End: 2019-12-22

## 2019-12-22 RX ORDER — ACETAMINOPHEN 500 MG
500 TABLET ORAL EVERY 4 HOURS PRN
Qty: 20 TABLET | Refills: 0 | Status: SHIPPED | OUTPATIENT
Start: 2019-12-22 | End: 2019-12-27

## 2019-12-22 RX ADMIN — BETAMETHASONE SODIUM PHOSPHATE AND BETAMETHASONE ACETATE 6 MG: 3; 3 INJECTION, SUSPENSION INTRA-ARTICULAR; INTRALESIONAL; INTRAMUSCULAR at 12:12

## 2019-12-22 RX ADMIN — ACETAMINOPHEN 500 MG: 500 TABLET, FILM COATED ORAL at 12:12

## 2019-12-22 RX ADMIN — CYCLOBENZAPRINE HYDROCHLORIDE 10 MG: 10 TABLET, FILM COATED ORAL at 12:12

## 2019-12-22 RX ADMIN — LIDOCAINE 1 PATCH: 50 PATCH TOPICAL at 12:12

## 2019-12-22 NOTE — DISCHARGE INSTRUCTIONS
Take medications as prescribed. Follow up with primary care in 2 days. [facility] for persisting symptoms in 2 days. Return to ER for worsening symptoms or as needed

## 2019-12-22 NOTE — ED PROVIDER NOTES
Encounter Date: 2019    SCRIBE #1 NOTE: I, Loree Nevarez, am scribing for, and in the presence of,  Marianne Lincoln PA-C. I have scribed the following portions of the note - Other sections scribed: HPI, ROS.       History     Chief Complaint   Patient presents with    Back Pain     chronic lower middle xwk denies fall, denies dysuria,     Knee Pain     right knee pain x3 day denies fall, only hurts when bend knee     CC: Back pain    HPI:  This is a 53 y.o. female with a PMHx of Stroke and HTN who presents to the Emergency Department complaining of constant 10/10 back pain that began around 6 days ago on Monday. Back pain became constant 2 days ago on Friday. Pt also reports 9/10 right knee pain that began around 6 days ago. Pt denies any recent lifting, fall, or trauma. Pt reports an associated symptom of knee swelling. She denies fever, chills, nausea, vomiting, hematuria, dysuria, frequency, or weakness. Pain worsens with movement. Pt has taken Tylenol and muscle relaxers with no relief. Pt has a PSHx of tubal ligation.    The history is provided by the patient.     Review of patient's allergies indicates:   Allergen Reactions    Ace inhibitors Other (See Comments)     Angioedema       Past Medical History:   Diagnosis Date    Hypertension     Stroke      Past Surgical History:   Procedure Laterality Date    TUBAL LIGATION       Family History   Problem Relation Age of Onset    Hypertension Mother     Hypotension Father      Social History     Tobacco Use    Smoking status: Former Smoker     Packs/day: 0.50     Types: Cigarettes     Last attempt to quit: 2017     Years since quittin.1    Smokeless tobacco: Never Used   Substance Use Topics    Alcohol use: Yes     Comment: socially    Drug use: No     Review of Systems   Constitutional: Negative for chills and fever.   HENT: Negative for congestion, rhinorrhea and sore throat.    Respiratory: Negative for cough and shortness of breath.     Cardiovascular: Negative for chest pain.   Gastrointestinal: Negative for nausea and vomiting.   Genitourinary: Negative for dysuria, frequency and hematuria.   Musculoskeletal: Positive for back pain and myalgias (knee pain). Negative for neck pain.   Skin: Negative for rash and wound.   Neurological: Negative for speech difficulty, weakness and numbness.   Psychiatric/Behavioral: Negative for confusion.       Physical Exam     Initial Vitals [12/22/19 1136]   BP Pulse Resp Temp SpO2   (!) 170/92 83 20 97.9 °F (36.6 °C) 100 %      MAP       --         Physical Exam    Nursing note and vitals reviewed.  Constitutional: She appears well-developed and well-nourished.   HENT:   Head: Normocephalic.   Right Ear: External ear normal.   Left Ear: External ear normal.   Nose: Nose normal.   Mouth/Throat: Oropharynx is clear and moist.   Eyes: Conjunctivae are normal.   Cardiovascular: Normal rate and regular rhythm. Exam reveals no gallop and no friction rub.    No murmur heard.  Pulses:       Dorsalis pedis pulses are 2+ on the right side, and 2+ on the left side.   Pulmonary/Chest: Breath sounds normal. She has no wheezes. She has no rhonchi. She has no rales.   Abdominal: Soft. Bowel sounds are normal. She exhibits no distension. There is no tenderness. There is no rebound, no guarding, no tenderness at McBurney's point and negative Delaney's sign.   Musculoskeletal: Normal range of motion.   TTP over lumbar spine and right paraspinal musculature. TTP over superior aspect of R knee and medial joint line with mild swelling. No overlying erythema. Pt able to flex and extend knee.     4/5 strength to RLE due to knee pain. 5/5 strength to LLE   Neurological: She is alert. She has normal strength. No cranial nerve deficit or sensory deficit.   Skin: Skin is warm and dry.   Psychiatric: She has a normal mood and affect.         ED Course   Procedures  Labs Reviewed - No data to display       Imaging Results          X-Ray  Knee 3 View Right (Final result)  Result time 12/22/19 13:08:40    Final result by Reagna Shine MD (12/22/19 13:08:40)                 Impression:      As above.      Electronically signed by: Reagan Shine MD  Date:    12/22/2019  Time:    13:08             Narrative:    EXAMINATION:  XR KNEE 3 VIEW RIGHT    CLINICAL HISTORY:  Pain in unspecified knee    TECHNIQUE:  AP, lateral, and Merchant views of the right knee were performed.    COMPARISON:  None    FINDINGS:  No fracture or dislocation.  Moderate-sized joint effusion.  Cartilage spaces are maintained on nonweightbearing views.  Mild osteophyte production noted.                                 Medical Decision Making:   Initial Assessment:   54 y/o female presenting for evaluation of acute exacerbation of chronic lumbar back pain. Attempted tx with tylenol PM. Has had 6 day history of intermittent back pain that became constant 2 days ago. Denies falls or trauma. Denies fever, chills, nausea, vomiting, urinary symptoms,  Additionally c/o atraumatic R knee pain. Attempted tx with muscle relaxer without relief.  Exam above.  Review of chart shows the patient had x-ray in 2014 of the lumbar spine with arthritic changes. Considered but doubt cauda equina, epidural abscess.  X-ray of the right knee negative fracture dislocation.  Remarkable for arthritic changes well celestone IM, Flexeril p.o. and Lidoderm patch applied in the ED for symptomatic treatment.  Will discharge patient with Tylenol, Flexeril and Lidoderm patches and have [facility].  Return to ER for worsening symptoms or as needed.              Scribe Attestation:   Scribe #1: I performed the above scribed service and the documentation accurately describes the services I performed. I attest to the accuracy of the note.                          Clinical Impression:       ICD-10-CM ICD-9-CM   1. Lumbar back pain M54.5 724.2   2. Knee pain M25.569 719.46   3. Arthritis of  knee M17.10 716.96              Scribe attestation: I, Marianne Lincoln PA-C personally performed the services described in this documentation. All medical record entries made by the scribe were at my direction and in my presence.  I have reviewed the chart and agree that the record reflects my personal performance and is accurate and complete.               Marianne Lincoln PA-C  12/22/19 1441

## 2019-12-22 NOTE — ED TRIAGE NOTES
Pt c/o lower back pain that is chronic, RIGHT knee pain x3 days. Denies fall, trauma, dysuria. Pain is 10/10. Pt able to ambulate

## 2019-12-30 ENCOUNTER — HOSPITAL ENCOUNTER (EMERGENCY)
Facility: HOSPITAL | Age: 54
Discharge: LEFT BEFORE DISCHARGE | End: 2019-12-30
Attending: EMERGENCY MEDICINE

## 2019-12-30 VITALS
BODY MASS INDEX: 22.5 KG/M2 | SYSTOLIC BLOOD PRESSURE: 165 MMHG | TEMPERATURE: 98 F | WEIGHT: 140 LBS | RESPIRATION RATE: 18 BRPM | HEART RATE: 90 BPM | HEIGHT: 66 IN | OXYGEN SATURATION: 97 % | DIASTOLIC BLOOD PRESSURE: 89 MMHG

## 2019-12-30 DIAGNOSIS — Z53.21 ELOPED FROM EMERGENCY DEPARTMENT: ICD-10-CM

## 2019-12-30 DIAGNOSIS — M54.50 LOW BACK PAIN, UNSPECIFIED BACK PAIN LATERALITY, UNSPECIFIED CHRONICITY, UNSPECIFIED WHETHER SCIATICA PRESENT: Primary | ICD-10-CM

## 2019-12-30 PROCEDURE — 99281 EMR DPT VST MAYX REQ PHY/QHP: CPT

## 2019-12-31 NOTE — ED PROVIDER NOTES
Encounter Date: 2019       History     Chief Complaint   Patient presents with    Back Pain     c/o lower back for approx x1 week; denies trauma/heavy lifting     Patient Eloped after SORT/MSE Exam:   Limited HPI done in SORT/MSE - CC: Lower Back Pain    HPI: Chris De La Paz, a 54 y.o. female that presents to the ED for a 1 week history of lower back pain without trauma or known injury. The patient eloped prior to full history of present illness and review of systems could be completed. Limited physical exam as follows. As the patient has eloped prior to full evaluation, limited ROS as documented.       The history is provided by the patient. No  was used.     Review of patient's allergies indicates:   Allergen Reactions    Ace inhibitors Other (See Comments)     Angioedema       Past Medical History:   Diagnosis Date    Hypertension     Stroke      Past Surgical History:   Procedure Laterality Date    TUBAL LIGATION       Family History   Problem Relation Age of Onset    Hypertension Mother     Hypotension Father      Social History     Tobacco Use    Smoking status: Former Smoker     Packs/day: 0.50     Types: Cigarettes     Last attempt to quit: 2017     Years since quittin.1    Smokeless tobacco: Never Used   Substance Use Topics    Alcohol use: Yes     Comment: socially    Drug use: No     Review of Systems   Musculoskeletal: Positive for back pain.       Physical Exam     Initial Vitals [19 1901]   BP Pulse Resp Temp SpO2   (!) 165/89 90 18 98.3 °F (36.8 °C) 97 %      MAP       --         Physical Exam    Vitals reviewed.  Constitutional: She is not diaphoretic. No distress.   HENT:   Head: Normocephalic and atraumatic.   Pulmonary/Chest: No respiratory distress.   Neurological: She is alert and oriented to person, place, and time.         ED Course   Procedures  Labs Reviewed - No data to display       Imaging Results    None                APC / Resident  Notes:   This is an evaluation of a 54 y.o. female that presents to the Emergency Department for atraumatic lower back pain. Limited physical exam done during SORT/MSE shows a non-toxic, afebrile, and well appearing female. Awake and alert. No respiratory distress on visual exam.  The patient was awaiting an exam room for a full history of present illness, review of systems, physical exam, and implementation of the plan of care. Nursing staff made three unsuccessful attempts to locate the patient in the waiting area and surrounding ED lobby. As the patient left after the SORT/MSE evaluation and prior to full examination the patient will be marked as eloped. BLAIR Luong, LORAINE-C                              Clinical Impression:       ICD-10-CM ICD-9-CM   1. Low back pain, unspecified back pain laterality, unspecified chronicity, unspecified whether sciatica present M54.5 724.2   2. Eloped from emergency department Z53.21 V64.2         Disposition:   Disposition: Eloped                     LORAINE Stokes  12/30/19 2026

## 2020-02-19 NOTE — PROGRESS NOTES
Body Location Override (Optional - Billing Will Still Be Based On Selected Body Map Location If Applicable): left forearm Patient tolerated injections well.    Detail Level: Simple Add 19914 Cpt? (Important Note: In 2017 The Use Of 95205 Is Being Tracked By Cms To Determine Future Global Period Reimbursement For Global Periods): yes

## 2021-02-25 ENCOUNTER — HOSPITAL ENCOUNTER (EMERGENCY)
Facility: HOSPITAL | Age: 56
Discharge: HOME | End: 2021-02-25
Attending: EMERGENCY MEDICINE
Payer: MEDICAID

## 2021-02-25 VITALS
RESPIRATION RATE: 18 BRPM | TEMPERATURE: 99 F | SYSTOLIC BLOOD PRESSURE: 182 MMHG | BODY MASS INDEX: 23.54 KG/M2 | WEIGHT: 150 LBS | HEIGHT: 67 IN | HEART RATE: 77 BPM | DIASTOLIC BLOOD PRESSURE: 95 MMHG | OXYGEN SATURATION: 100 %

## 2021-02-25 DIAGNOSIS — N39.0 URINARY TRACT INFECTION WITHOUT HEMATURIA, SITE UNSPECIFIED: ICD-10-CM

## 2021-02-25 DIAGNOSIS — M54.50 ACUTE BILATERAL LOW BACK PAIN WITHOUT SCIATICA: Primary | ICD-10-CM

## 2021-02-25 DIAGNOSIS — S39.012A LUMBAR STRAIN, INITIAL ENCOUNTER: ICD-10-CM

## 2021-02-25 LAB
B-HCG UR QL: NEGATIVE
BACTERIA #/AREA URNS HPF: ABNORMAL /HPF
BILIRUB UR QL STRIP: NEGATIVE
CLARITY UR: ABNORMAL
COLOR UR: YELLOW
CTP QC/QA: YES
GLUCOSE UR QL STRIP: NEGATIVE
HGB UR QL STRIP: NEGATIVE
HYALINE CASTS #/AREA URNS LPF: 2 /LPF
KETONES UR QL STRIP: NEGATIVE
LEUKOCYTE ESTERASE UR QL STRIP: ABNORMAL
MICROSCOPIC COMMENT: ABNORMAL
NITRITE UR QL STRIP: POSITIVE
PH UR STRIP: 7 [PH] (ref 5–8)
PROT UR QL STRIP: ABNORMAL
RBC #/AREA URNS HPF: 3 /HPF (ref 0–4)
SP GR UR STRIP: 1.02 (ref 1–1.03)
SQUAMOUS #/AREA URNS HPF: 0 /HPF
URN SPEC COLLECT METH UR: ABNORMAL
UROBILINOGEN UR STRIP-ACNC: NEGATIVE EU/DL
WBC #/AREA URNS HPF: 50 /HPF (ref 0–5)
WBC CLUMPS URNS QL MICRO: ABNORMAL

## 2021-02-25 PROCEDURE — 25000003 PHARM REV CODE 250: Performed by: PHYSICIAN ASSISTANT

## 2021-02-25 PROCEDURE — 81025 URINE PREGNANCY TEST: CPT | Performed by: PHYSICIAN ASSISTANT

## 2021-02-25 PROCEDURE — 87086 URINE CULTURE/COLONY COUNT: CPT

## 2021-02-25 PROCEDURE — 81000 URINALYSIS NONAUTO W/SCOPE: CPT

## 2021-02-25 PROCEDURE — 63600175 PHARM REV CODE 636 W HCPCS: Performed by: PHYSICIAN ASSISTANT

## 2021-02-25 PROCEDURE — 99284 EMERGENCY DEPT VISIT MOD MDM: CPT | Mod: 25

## 2021-02-25 PROCEDURE — 96372 THER/PROPH/DIAG INJ SC/IM: CPT

## 2021-02-25 RX ORDER — CYCLOBENZAPRINE HCL 10 MG
10 TABLET ORAL 3 TIMES DAILY PRN
Qty: 15 TABLET | Refills: 0 | Status: SHIPPED | OUTPATIENT
Start: 2021-02-25 | End: 2021-03-02

## 2021-02-25 RX ORDER — CIPROFLOXACIN 500 MG/1
500 TABLET ORAL 2 TIMES DAILY
Qty: 20 TABLET | Refills: 0 | Status: SHIPPED | OUTPATIENT
Start: 2021-02-25 | End: 2021-03-04

## 2021-02-25 RX ORDER — LIDOCAINE 50 MG/G
1 PATCH TOPICAL DAILY
Qty: 15 PATCH | Refills: 0 | OUTPATIENT
Start: 2021-02-25 | End: 2021-06-01

## 2021-02-25 RX ORDER — CEFTRIAXONE 1 G/1
1 INJECTION, POWDER, FOR SOLUTION INTRAMUSCULAR; INTRAVENOUS
Status: COMPLETED | OUTPATIENT
Start: 2021-02-25 | End: 2021-02-25

## 2021-02-25 RX ORDER — CIPROFLOXACIN 500 MG/1
500 TABLET ORAL 2 TIMES DAILY
Qty: 20 TABLET | Refills: 0 | Status: SHIPPED | OUTPATIENT
Start: 2021-02-25 | End: 2021-02-25 | Stop reason: SDUPTHER

## 2021-02-25 RX ORDER — KETOROLAC TROMETHAMINE 10 MG/1
10 TABLET, FILM COATED ORAL EVERY 6 HOURS
Qty: 20 TABLET | Refills: 0 | Status: SHIPPED | OUTPATIENT
Start: 2021-02-25 | End: 2021-03-02

## 2021-02-25 RX ORDER — KETOROLAC TROMETHAMINE 30 MG/ML
30 INJECTION, SOLUTION INTRAMUSCULAR; INTRAVENOUS
Status: COMPLETED | OUTPATIENT
Start: 2021-02-25 | End: 2021-02-25

## 2021-02-25 RX ORDER — ORPHENADRINE CITRATE 30 MG/ML
60 INJECTION INTRAMUSCULAR; INTRAVENOUS
Status: COMPLETED | OUTPATIENT
Start: 2021-02-25 | End: 2021-02-25

## 2021-02-25 RX ORDER — LIDOCAINE HYDROCHLORIDE 10 MG/ML
5 INJECTION INFILTRATION; PERINEURAL
Status: COMPLETED | OUTPATIENT
Start: 2021-02-25 | End: 2021-02-25

## 2021-02-25 RX ORDER — LIDOCAINE 50 MG/G
1 PATCH TOPICAL
Status: DISCONTINUED | OUTPATIENT
Start: 2021-02-25 | End: 2021-02-25 | Stop reason: HOSPADM

## 2021-02-25 RX ADMIN — KETOROLAC TROMETHAMINE 30 MG: 30 INJECTION, SOLUTION INTRAMUSCULAR; INTRAVENOUS at 10:02

## 2021-02-25 RX ADMIN — LIDOCAINE HYDROCHLORIDE 5 ML: 10 INJECTION, SOLUTION INFILTRATION; PERINEURAL at 12:02

## 2021-02-25 RX ADMIN — CEFTRIAXONE 1 G: 1 INJECTION, POWDER, FOR SOLUTION INTRAMUSCULAR; INTRAVENOUS at 12:02

## 2021-02-25 RX ADMIN — LIDOCAINE 1 PATCH: 50 PATCH TOPICAL at 11:02

## 2021-02-25 RX ADMIN — ORPHENADRINE CITRATE 60 MG: 60 INJECTION INTRAMUSCULAR; INTRAVENOUS at 10:02

## 2021-02-27 LAB — BACTERIA UR CULT: NORMAL

## 2021-06-01 ENCOUNTER — HOSPITAL ENCOUNTER (EMERGENCY)
Facility: HOSPITAL | Age: 56
Discharge: HOME | End: 2021-06-01
Attending: EMERGENCY MEDICINE
Payer: MEDICAID

## 2021-06-01 VITALS
DIASTOLIC BLOOD PRESSURE: 94 MMHG | SYSTOLIC BLOOD PRESSURE: 198 MMHG | OXYGEN SATURATION: 100 % | RESPIRATION RATE: 18 BRPM | WEIGHT: 154.31 LBS | HEART RATE: 88 BPM | BODY MASS INDEX: 24.22 KG/M2 | TEMPERATURE: 98 F | HEIGHT: 67 IN

## 2021-06-01 DIAGNOSIS — M54.50 LUMBAR BACK PAIN: Primary | ICD-10-CM

## 2021-06-01 PROCEDURE — 63600175 PHARM REV CODE 636 W HCPCS: Performed by: PHYSICIAN ASSISTANT

## 2021-06-01 PROCEDURE — 96372 THER/PROPH/DIAG INJ SC/IM: CPT

## 2021-06-01 PROCEDURE — 25000003 PHARM REV CODE 250: Performed by: PHYSICIAN ASSISTANT

## 2021-06-01 PROCEDURE — 99284 EMERGENCY DEPT VISIT MOD MDM: CPT | Mod: 25

## 2021-06-01 RX ORDER — ACETAMINOPHEN 500 MG
500 TABLET ORAL EVERY 4 HOURS PRN
Qty: 20 TABLET | Refills: 0 | Status: SHIPPED | OUTPATIENT
Start: 2021-06-01 | End: 2021-06-06

## 2021-06-01 RX ORDER — DEXAMETHASONE SODIUM PHOSPHATE 4 MG/ML
8 INJECTION, SOLUTION INTRA-ARTICULAR; INTRALESIONAL; INTRAMUSCULAR; INTRAVENOUS; SOFT TISSUE
Status: COMPLETED | OUTPATIENT
Start: 2021-06-01 | End: 2021-06-01

## 2021-06-01 RX ORDER — CYCLOBENZAPRINE HCL 10 MG
10 TABLET ORAL 3 TIMES DAILY PRN
Qty: 20 TABLET | Refills: 0 | Status: SHIPPED | OUTPATIENT
Start: 2021-06-01 | End: 2021-06-08

## 2021-06-01 RX ORDER — CYCLOBENZAPRINE HCL 10 MG
10 TABLET ORAL
Status: COMPLETED | OUTPATIENT
Start: 2021-06-01 | End: 2021-06-01

## 2021-06-01 RX ORDER — HYDROCHLOROTHIAZIDE 25 MG/1
12.5 TABLET ORAL
COMMUNITY
Start: 2021-05-11 | End: 2021-08-09

## 2021-06-01 RX ORDER — LIDOCAINE 50 MG/G
1 PATCH TOPICAL DAILY
Qty: 15 PATCH | Refills: 0 | Status: SHIPPED | OUTPATIENT
Start: 2021-06-01 | End: 2021-06-16

## 2021-06-01 RX ORDER — LOSARTAN POTASSIUM 50 MG/1
50 TABLET ORAL
COMMUNITY
Start: 2021-05-11 | End: 2021-08-09

## 2021-06-01 RX ADMIN — DEXAMETHASONE SODIUM PHOSPHATE 8 MG: 4 INJECTION INTRA-ARTICULAR; INTRALESIONAL; INTRAMUSCULAR; INTRAVENOUS; SOFT TISSUE at 09:06

## 2021-06-01 RX ADMIN — CYCLOBENZAPRINE 10 MG: 10 TABLET, FILM COATED ORAL at 09:06

## 2021-07-01 ENCOUNTER — PATIENT MESSAGE (OUTPATIENT)
Dept: ADMINISTRATIVE | Facility: OTHER | Age: 56
End: 2021-07-01

## 2021-08-02 ENCOUNTER — HOSPITAL ENCOUNTER (EMERGENCY)
Facility: HOSPITAL | Age: 56
Discharge: HOME | End: 2021-08-02
Attending: EMERGENCY MEDICINE
Payer: MEDICAID

## 2021-08-02 VITALS
HEIGHT: 67 IN | SYSTOLIC BLOOD PRESSURE: 186 MMHG | TEMPERATURE: 98 F | WEIGHT: 150 LBS | DIASTOLIC BLOOD PRESSURE: 94 MMHG | BODY MASS INDEX: 23.54 KG/M2 | RESPIRATION RATE: 20 BRPM | OXYGEN SATURATION: 98 % | HEART RATE: 87 BPM

## 2021-08-02 DIAGNOSIS — M54.50 ACUTE LEFT-SIDED LOW BACK PAIN WITHOUT SCIATICA: Primary | ICD-10-CM

## 2021-08-02 PROCEDURE — 99284 EMERGENCY DEPT VISIT MOD MDM: CPT | Mod: 25

## 2021-08-02 PROCEDURE — 63600175 PHARM REV CODE 636 W HCPCS: Performed by: EMERGENCY MEDICINE

## 2021-08-02 PROCEDURE — 96372 THER/PROPH/DIAG INJ SC/IM: CPT

## 2021-08-02 RX ORDER — DEXAMETHASONE SODIUM PHOSPHATE 4 MG/ML
8 INJECTION, SOLUTION INTRA-ARTICULAR; INTRALESIONAL; INTRAMUSCULAR; INTRAVENOUS; SOFT TISSUE
Status: COMPLETED | OUTPATIENT
Start: 2021-08-02 | End: 2021-08-02

## 2021-08-02 RX ORDER — KETOROLAC TROMETHAMINE 10 MG/1
10 TABLET, FILM COATED ORAL EVERY 6 HOURS PRN
Qty: 20 TABLET | Refills: 1 | OUTPATIENT
Start: 2021-08-02 | End: 2021-10-01

## 2021-08-02 RX ORDER — CYCLOBENZAPRINE HCL 10 MG
10 TABLET ORAL 3 TIMES DAILY PRN
Qty: 15 TABLET | Refills: 1 | Status: SHIPPED | OUTPATIENT
Start: 2021-08-02 | End: 2021-08-07

## 2021-08-02 RX ORDER — KETOROLAC TROMETHAMINE 10 MG/1
10 TABLET, FILM COATED ORAL EVERY 6 HOURS PRN
Qty: 20 TABLET | Refills: 1 | Status: SHIPPED | OUTPATIENT
Start: 2021-08-02 | End: 2021-08-02 | Stop reason: SDUPTHER

## 2021-08-02 RX ORDER — CYCLOBENZAPRINE HCL 10 MG
10 TABLET ORAL 3 TIMES DAILY PRN
Qty: 15 TABLET | Refills: 1 | Status: SHIPPED | OUTPATIENT
Start: 2021-08-02 | End: 2021-08-02 | Stop reason: SDUPTHER

## 2021-08-02 RX ORDER — KETOROLAC TROMETHAMINE 30 MG/ML
60 INJECTION, SOLUTION INTRAMUSCULAR; INTRAVENOUS
Status: COMPLETED | OUTPATIENT
Start: 2021-08-02 | End: 2021-08-02

## 2021-08-02 RX ADMIN — DEXAMETHASONE SODIUM PHOSPHATE 8 MG: 4 INJECTION INTRA-ARTICULAR; INTRALESIONAL; INTRAMUSCULAR; INTRAVENOUS; SOFT TISSUE at 10:08

## 2021-08-02 RX ADMIN — KETOROLAC TROMETHAMINE 60 MG: 30 INJECTION, SOLUTION INTRAMUSCULAR; INTRAVENOUS at 10:08

## 2021-08-13 ENCOUNTER — PATIENT OUTREACH (OUTPATIENT)
Dept: EMERGENCY MEDICINE | Facility: HOSPITAL | Age: 56
End: 2021-08-13

## 2021-08-25 ENCOUNTER — PATIENT OUTREACH (OUTPATIENT)
Dept: EMERGENCY MEDICINE | Facility: HOSPITAL | Age: 56
End: 2021-08-25

## 2021-08-27 ENCOUNTER — PATIENT OUTREACH (OUTPATIENT)
Dept: EMERGENCY MEDICINE | Facility: HOSPITAL | Age: 56
End: 2021-08-27
Payer: MEDICAID

## 2021-10-01 ENCOUNTER — HOSPITAL ENCOUNTER (EMERGENCY)
Facility: HOSPITAL | Age: 56
Discharge: HOME | End: 2021-10-01
Attending: EMERGENCY MEDICINE
Payer: MEDICAID

## 2021-10-01 VITALS
BODY MASS INDEX: 23.54 KG/M2 | TEMPERATURE: 99 F | WEIGHT: 150 LBS | OXYGEN SATURATION: 100 % | RESPIRATION RATE: 18 BRPM | DIASTOLIC BLOOD PRESSURE: 90 MMHG | SYSTOLIC BLOOD PRESSURE: 161 MMHG | HEIGHT: 67 IN | HEART RATE: 89 BPM

## 2021-10-01 DIAGNOSIS — R03.0 ELEVATED BLOOD PRESSURE READING: ICD-10-CM

## 2021-10-01 DIAGNOSIS — M54.50 ACUTE RIGHT-SIDED LOW BACK PAIN WITHOUT SCIATICA: Primary | ICD-10-CM

## 2021-10-01 LAB
B-HCG UR QL: NEGATIVE
CTP QC/QA: YES

## 2021-10-01 PROCEDURE — 25000003 PHARM REV CODE 250: Performed by: PHYSICIAN ASSISTANT

## 2021-10-01 PROCEDURE — 81025 URINE PREGNANCY TEST: CPT | Performed by: PHYSICIAN ASSISTANT

## 2021-10-01 PROCEDURE — 63600175 PHARM REV CODE 636 W HCPCS: Performed by: PHYSICIAN ASSISTANT

## 2021-10-01 PROCEDURE — 99284 EMERGENCY DEPT VISIT MOD MDM: CPT | Mod: 25

## 2021-10-01 PROCEDURE — 96372 THER/PROPH/DIAG INJ SC/IM: CPT

## 2021-10-01 RX ORDER — HYDROCHLOROTHIAZIDE 25 MG/1
50 TABLET ORAL
Status: COMPLETED | OUTPATIENT
Start: 2021-10-01 | End: 2021-10-01

## 2021-10-01 RX ORDER — KETOROLAC TROMETHAMINE 30 MG/ML
15 INJECTION, SOLUTION INTRAMUSCULAR; INTRAVENOUS
Status: COMPLETED | OUTPATIENT
Start: 2021-10-01 | End: 2021-10-01

## 2021-10-01 RX ORDER — HYDROCHLOROTHIAZIDE 50 MG/1
50 TABLET ORAL DAILY
COMMUNITY

## 2021-10-01 RX ORDER — NAPROXEN 500 MG/1
500 TABLET ORAL 2 TIMES DAILY WITH MEALS
Qty: 10 TABLET | Refills: 0 | OUTPATIENT
Start: 2021-10-01 | End: 2023-05-08

## 2021-10-01 RX ORDER — METHOCARBAMOL 500 MG/1
500 TABLET, FILM COATED ORAL 3 TIMES DAILY
Qty: 15 TABLET | Refills: 0 | OUTPATIENT
Start: 2021-10-01 | End: 2021-10-02

## 2021-10-01 RX ADMIN — KETOROLAC TROMETHAMINE 15 MG: 30 INJECTION, SOLUTION INTRAMUSCULAR at 02:10

## 2021-10-01 RX ADMIN — HYDROCHLOROTHIAZIDE 50 MG: 25 TABLET ORAL at 02:10

## 2021-10-02 ENCOUNTER — HOSPITAL ENCOUNTER (EMERGENCY)
Facility: HOSPITAL | Age: 56
Discharge: HOME | End: 2021-10-02
Attending: EMERGENCY MEDICINE
Payer: MEDICAID

## 2021-10-02 VITALS
HEART RATE: 100 BPM | RESPIRATION RATE: 20 BRPM | DIASTOLIC BLOOD PRESSURE: 95 MMHG | WEIGHT: 150 LBS | BODY MASS INDEX: 23.54 KG/M2 | OXYGEN SATURATION: 98 % | TEMPERATURE: 99 F | SYSTOLIC BLOOD PRESSURE: 198 MMHG | HEIGHT: 67 IN

## 2021-10-02 DIAGNOSIS — R19.7 DIARRHEA, UNSPECIFIED TYPE: ICD-10-CM

## 2021-10-02 DIAGNOSIS — R11.2 NON-INTRACTABLE VOMITING WITH NAUSEA, UNSPECIFIED VOMITING TYPE: Primary | ICD-10-CM

## 2021-10-02 LAB
ALBUMIN SERPL BCP-MCNC: 3.7 G/DL (ref 3.5–5.2)
ALP SERPL-CCNC: 83 U/L (ref 55–135)
ALT SERPL W/O P-5'-P-CCNC: 25 U/L (ref 10–44)
ANION GAP SERPL CALC-SCNC: 14 MMOL/L (ref 8–16)
AST SERPL-CCNC: 38 U/L (ref 10–40)
BASOPHILS # BLD AUTO: 0.01 K/UL (ref 0–0.2)
BASOPHILS NFR BLD: 0.2 % (ref 0–1.9)
BILIRUB SERPL-MCNC: 0.2 MG/DL (ref 0.1–1)
BILIRUB UR QL STRIP: NEGATIVE
BUN SERPL-MCNC: 15 MG/DL (ref 6–20)
CALCIUM SERPL-MCNC: 10.8 MG/DL (ref 8.7–10.5)
CHLORIDE SERPL-SCNC: 104 MMOL/L (ref 95–110)
CLARITY UR: CLEAR
CO2 SERPL-SCNC: 18 MMOL/L (ref 23–29)
COLOR UR: COLORLESS
CREAT SERPL-MCNC: 1.3 MG/DL (ref 0.5–1.4)
DIFFERENTIAL METHOD: ABNORMAL
EOSINOPHIL # BLD AUTO: 0 K/UL (ref 0–0.5)
EOSINOPHIL NFR BLD: 0 % (ref 0–8)
ERYTHROCYTE [DISTWIDTH] IN BLOOD BY AUTOMATED COUNT: 18.5 % (ref 11.5–14.5)
EST. GFR  (AFRICAN AMERICAN): 54 ML/MIN/1.73 M^2
EST. GFR  (NON AFRICAN AMERICAN): 47 ML/MIN/1.73 M^2
GLUCOSE SERPL-MCNC: 98 MG/DL (ref 70–110)
GLUCOSE UR QL STRIP: NEGATIVE
HCT VFR BLD AUTO: 25 % (ref 37–48.5)
HGB BLD-MCNC: 7.8 G/DL (ref 12–16)
HGB UR QL STRIP: ABNORMAL
IMM GRANULOCYTES # BLD AUTO: 0.01 K/UL (ref 0–0.04)
IMM GRANULOCYTES NFR BLD AUTO: 0.2 % (ref 0–0.5)
KETONES UR QL STRIP: NEGATIVE
LEUKOCYTE ESTERASE UR QL STRIP: NEGATIVE
LIPASE SERPL-CCNC: 41 U/L (ref 4–60)
LYMPHOCYTES # BLD AUTO: 1 K/UL (ref 1–4.8)
LYMPHOCYTES NFR BLD: 17 % (ref 18–48)
MCH RBC QN AUTO: 24.7 PG (ref 27–31)
MCHC RBC AUTO-ENTMCNC: 31.2 G/DL (ref 32–36)
MCV RBC AUTO: 79 FL (ref 82–98)
MICROSCOPIC COMMENT: NORMAL
MONOCYTES # BLD AUTO: 0.6 K/UL (ref 0.3–1)
MONOCYTES NFR BLD: 10 % (ref 4–15)
NEUTROPHILS # BLD AUTO: 4.2 K/UL (ref 1.8–7.7)
NEUTROPHILS NFR BLD: 72.6 % (ref 38–73)
NITRITE UR QL STRIP: NEGATIVE
NRBC BLD-RTO: 0 /100 WBC
PH UR STRIP: 6 [PH] (ref 5–8)
PLATELET # BLD AUTO: 374 K/UL (ref 150–450)
PMV BLD AUTO: 10.5 FL (ref 9.2–12.9)
POCT GLUCOSE: 93 MG/DL (ref 70–110)
POTASSIUM SERPL-SCNC: 4 MMOL/L (ref 3.5–5.1)
PROT SERPL-MCNC: 8.5 G/DL (ref 6–8.4)
PROT UR QL STRIP: NEGATIVE
RBC # BLD AUTO: 3.16 M/UL (ref 4–5.4)
RBC #/AREA URNS HPF: 2 /HPF (ref 0–4)
SODIUM SERPL-SCNC: 136 MMOL/L (ref 136–145)
SP GR UR STRIP: 1.01 (ref 1–1.03)
URN SPEC COLLECT METH UR: ABNORMAL
UROBILINOGEN UR STRIP-ACNC: NEGATIVE EU/DL
WBC # BLD AUTO: 5.78 K/UL (ref 3.9–12.7)

## 2021-10-02 PROCEDURE — 99284 EMERGENCY DEPT VISIT MOD MDM: CPT | Mod: 25

## 2021-10-02 PROCEDURE — 85025 COMPLETE CBC W/AUTO DIFF WBC: CPT | Performed by: PHYSICIAN ASSISTANT

## 2021-10-02 PROCEDURE — 82962 GLUCOSE BLOOD TEST: CPT

## 2021-10-02 PROCEDURE — 96374 THER/PROPH/DIAG INJ IV PUSH: CPT

## 2021-10-02 PROCEDURE — 81000 URINALYSIS NONAUTO W/SCOPE: CPT | Performed by: PHYSICIAN ASSISTANT

## 2021-10-02 PROCEDURE — 96361 HYDRATE IV INFUSION ADD-ON: CPT

## 2021-10-02 PROCEDURE — 83690 ASSAY OF LIPASE: CPT | Performed by: PHYSICIAN ASSISTANT

## 2021-10-02 PROCEDURE — 80053 COMPREHEN METABOLIC PANEL: CPT | Performed by: PHYSICIAN ASSISTANT

## 2021-10-02 PROCEDURE — 63600175 PHARM REV CODE 636 W HCPCS: Performed by: PHYSICIAN ASSISTANT

## 2021-10-02 RX ORDER — CYCLOBENZAPRINE HCL 10 MG
10 TABLET ORAL 3 TIMES DAILY PRN
Qty: 15 TABLET | Refills: 0 | Status: SHIPPED | OUTPATIENT
Start: 2021-10-02 | End: 2021-10-07

## 2021-10-02 RX ORDER — ONDANSETRON 2 MG/ML
8 INJECTION INTRAMUSCULAR; INTRAVENOUS
Status: COMPLETED | OUTPATIENT
Start: 2021-10-02 | End: 2021-10-02

## 2021-10-02 RX ORDER — ONDANSETRON 4 MG/1
8 TABLET, ORALLY DISINTEGRATING ORAL 2 TIMES DAILY
Qty: 20 TABLET | Refills: 0 | Status: SHIPPED | OUTPATIENT
Start: 2021-10-02 | End: 2021-10-07

## 2021-10-02 RX ADMIN — ONDANSETRON 8 MG: 2 INJECTION INTRAMUSCULAR; INTRAVENOUS at 09:10

## 2022-02-16 ENCOUNTER — HOSPITAL ENCOUNTER (EMERGENCY)
Facility: HOSPITAL | Age: 57
Discharge: HOME | End: 2022-02-16
Attending: EMERGENCY MEDICINE
Payer: MEDICAID

## 2022-02-16 VITALS
DIASTOLIC BLOOD PRESSURE: 100 MMHG | RESPIRATION RATE: 19 BRPM | HEIGHT: 66 IN | BODY MASS INDEX: 24.11 KG/M2 | HEART RATE: 85 BPM | WEIGHT: 150 LBS | TEMPERATURE: 98 F | SYSTOLIC BLOOD PRESSURE: 216 MMHG | OXYGEN SATURATION: 98 %

## 2022-02-16 DIAGNOSIS — H57.11 ACUTE RIGHT EYE PAIN: ICD-10-CM

## 2022-02-16 DIAGNOSIS — I10 UNCONTROLLED HYPERTENSION: ICD-10-CM

## 2022-02-16 DIAGNOSIS — T15.91XA FOREIGN BODY OF RIGHT EYE, INITIAL ENCOUNTER: Primary | ICD-10-CM

## 2022-02-16 PROCEDURE — 25000003 PHARM REV CODE 250: Performed by: EMERGENCY MEDICINE

## 2022-02-16 PROCEDURE — 99283 EMERGENCY DEPT VISIT LOW MDM: CPT

## 2022-02-16 RX ORDER — ERYTHROMYCIN 5 MG/G
OINTMENT OPHTHALMIC
Qty: 1 G | Refills: 0 | Status: SHIPPED | OUTPATIENT
Start: 2022-02-16 | End: 2022-02-16 | Stop reason: SDUPTHER

## 2022-02-16 RX ORDER — ERYTHROMYCIN 5 MG/G
OINTMENT OPHTHALMIC
Status: COMPLETED | OUTPATIENT
Start: 2022-02-16 | End: 2022-02-16

## 2022-02-16 RX ORDER — ERYTHROMYCIN 5 MG/G
OINTMENT OPHTHALMIC
Qty: 1 G | Refills: 0 | Status: SHIPPED | OUTPATIENT
Start: 2022-02-16

## 2022-02-16 RX ORDER — TETRACAINE HYDROCHLORIDE 5 MG/ML
2 SOLUTION OPHTHALMIC
Status: COMPLETED | OUTPATIENT
Start: 2022-02-16 | End: 2022-02-16

## 2022-02-16 RX ADMIN — FLUORESCEIN SODIUM 1 EACH: 1 STRIP OPHTHALMIC at 07:02

## 2022-02-16 RX ADMIN — TETRACAINE HYDROCHLORIDE 2 DROP: 5 SOLUTION OPHTHALMIC at 07:02

## 2022-02-16 RX ADMIN — ERYTHROMYCIN 1 INCH: 5 OINTMENT OPHTHALMIC at 08:02

## 2022-02-17 NOTE — ED PROVIDER NOTES
Encounter Date: 2/16/2022       History     Chief Complaint   Patient presents with    Foreign Body in Eye     Presents with right eye pain x 3 days, states feels as if something is in her eye and she has been flushing and using Visine during that time. States history of HTN, compliant with medications     Patient presenting with foreign body sensation to right eye for the past several days.  She does not remember anything getting in her eye.  She has not been around any machinery or yd equipment.  She does not wear contacts.  She denies any visual changes or significant photophobia.    She is noted to be hypertensive at triage and states she has a history of hypertension and has been compliant with her blood pressure medication.        Review of patient's allergies indicates:   Allergen Reactions    Ace inhibitors Other (See Comments)     Angioedema       Past Medical History:   Diagnosis Date    Hypertension     Stroke      Past Surgical History:   Procedure Laterality Date    TUBAL LIGATION       Family History   Problem Relation Age of Onset    Hypertension Mother     Hypotension Father      Social History     Tobacco Use    Smoking status: Current Every Day Smoker     Packs/day: 0.50    Smokeless tobacco: Never Used   Substance Use Topics    Alcohol use: Yes     Comment: socially    Drug use: Yes     Types: Marijuana     Comment: occ     Review of Systems   Constitutional: Negative for chills and fever.   HENT: Negative for facial swelling.    Eyes: Positive for pain and redness. Negative for photophobia, discharge, itching and visual disturbance.   Respiratory: Negative for chest tightness and shortness of breath.    Cardiovascular: Negative for chest pain.   Gastrointestinal: Negative for nausea and vomiting.   Genitourinary: Negative for dysuria.   Musculoskeletal: Negative for gait problem.   Skin: Negative for pallor.   Neurological: Negative for facial asymmetry.   Psychiatric/Behavioral:  Negative for confusion.   All other systems reviewed and are negative.      Physical Exam     Initial Vitals [02/16/22 1910]   BP Pulse Resp Temp SpO2   (!) 216/100 85 19 98.2 °F (36.8 °C) 98 %      MAP       --         Physical Exam    Nursing note and vitals reviewed.  Constitutional: She is not diaphoretic. No distress.   Very hypertensive well-appearing black female in no acute distress.   HENT:   Head: Normocephalic.   Eyes: Conjunctivae, EOM and lids are normal. Pupils are equal, round, and reactive to light. Right eye exhibits no discharge and no exudate. No foreign body present in the right eye. Right conjunctiva has no hemorrhage. Right eye exhibits normal extraocular motion. Left eye exhibits normal extraocular motion.   Slit lamp exam:       The right eye shows no corneal abrasion, no corneal flare (No cell), no corneal ulcer, no foreign body, no hyphema, no hypopyon and no fluorescein uptake.   IOP was 18-18-20 on right side   Neck:   Normal range of motion.  Pulmonary/Chest: No respiratory distress.   Abdominal: She exhibits no distension.   Musculoskeletal:         General: Normal range of motion.      Cervical back: Normal range of motion.     Neurological: She is alert and oriented to person, place, and time.   Skin: Skin is warm and dry.   Psychiatric: She has a normal mood and affect.         ED Course   Procedures  Labs Reviewed - No data to display       Imaging Results    None          Medications   TETRAcaine HCl (PF) 0.5 % Drop 2 drop (2 drops Both Eyes Given by Other 2/16/22 1949)   fluorescein ophthalmic strip 1 each (1 each Both Eyes Given by Other 2/16/22 1949)   erythromycin 5 mg/gram (0.5 %) ophthalmic ointment (1 inch Right Eye Given 2/16/22 2000)     Medical Decision Making:   Initial Assessment:     Patient presenting with a foreign body sensation to right eye.  ED Management:    Physical exam was significant for a benign ophthalmological exam.  Specifically she has no evidence of  corneal abrasion.  She has no evidence of traumatic iritis, no cell or flare in her anterior chambers, no hyphema, no elevation of her intra-ocular pressures, and no evidence of retained foreign body.    Her symptoms are possibly due to a previous foreign body which caused a corneal abrasion which is now healed to the extent that is not visible on fluorescein staining, therefore I will treat empirically with erythromycin ointment.  She was given strict return precautions and referred to Ophthalmology 2 chief feel to improve and have resolution of her symptoms the next to 3 days.    In terms of her high blood pressure, it is asymptomatic.  She states compliance with her medication we had a long discussion about poorly controlled high blood pressure and the need to follow-up closely with her PCP.  No indication for further ED workup for asymptomatic hypertension.    Jose A Blackburn MD,   Emergency Medicine  02/16/2022 11:08 PM    (This note was written with voice recognition software.  Please excuse any grammatical errors.)                       Clinical Impression:   Final diagnoses:  [T15.91XA] Foreign body of right eye, initial encounter (Primary)  [H57.11] Acute right eye pain  [I10] Uncontrolled hypertension          ED Disposition Condition    Discharge Stable        ED Prescriptions     Medication Sig Dispense Start Date End Date Auth. Provider    erythromycin (ROMYCIN) ophthalmic ointment  (Status: Discontinued) Place a 1/2 inch ribbon of ointment into the lower eyelid qid x 5 days 1 g 2/16/2022 2/16/2022 Jose A Blackburn MD    erythromycin (ROMYCIN) ophthalmic ointment Place a 1/2 inch ribbon of ointment into the lower eyelid qid x 5 days 1 g 2/16/2022  Jose A Blackburn MD        Follow-up Information     Follow up With Specialties Details Why Contact Info    [facility] OPHTHALMOLOGY Ophthalmology Schedule an appointment as soon as possible for a visit  If symptoms worsen you develop with new visual symptoms  or your symptoms fail to resolve within 2-3 days. [address]  [facility] [address]  [phone]    Ammy Jordan MD Family Medicine, Wound Care Schedule an appointment as soon as possible for a visit  to follow up ED visit and discussed better blood pressure control [address] [place] [address]  [place] [address]  [phone]      [facility] Emergency Dept Emergency Medicine Go to  If symptoms worsen or fail to improve within 48 hours. [address]  [facility] [id number] [phone]           Jose A Blackburn MD  02/16/22 1891

## 2022-07-21 ENCOUNTER — HOSPITAL ENCOUNTER (EMERGENCY)
Facility: HOSPITAL | Age: 57
Discharge: HOME | End: 2022-07-21
Attending: STUDENT IN AN ORGANIZED HEALTH CARE EDUCATION/TRAINING PROGRAM
Payer: MEDICAID

## 2022-07-21 VITALS
HEIGHT: 66 IN | RESPIRATION RATE: 19 BRPM | HEART RATE: 85 BPM | DIASTOLIC BLOOD PRESSURE: 99 MMHG | BODY MASS INDEX: 24.11 KG/M2 | SYSTOLIC BLOOD PRESSURE: 184 MMHG | TEMPERATURE: 99 F | OXYGEN SATURATION: 98 % | WEIGHT: 150 LBS

## 2022-07-21 DIAGNOSIS — M54.50 ACUTE LEFT-SIDED LOW BACK PAIN WITHOUT SCIATICA: Primary | ICD-10-CM

## 2022-07-21 LAB
B-HCG UR QL: NEGATIVE
BILIRUB UR QL STRIP: NEGATIVE
CLARITY UR: CLEAR
COLOR UR: YELLOW
CTP QC/QA: YES
GLUCOSE UR QL STRIP: NEGATIVE
HGB UR QL STRIP: NEGATIVE
KETONES UR QL STRIP: ABNORMAL
LEUKOCYTE ESTERASE UR QL STRIP: ABNORMAL
MICROSCOPIC COMMENT: NORMAL
NITRITE UR QL STRIP: NEGATIVE
PH UR STRIP: 7 [PH] (ref 5–8)
PROT UR QL STRIP: NEGATIVE
SP GR UR STRIP: 1.01 (ref 1–1.03)
SQUAMOUS #/AREA URNS HPF: 0 /HPF
URN SPEC COLLECT METH UR: ABNORMAL
UROBILINOGEN UR STRIP-ACNC: NEGATIVE EU/DL
WBC #/AREA URNS HPF: 5 /HPF (ref 0–5)
WBC CLUMPS URNS QL MICRO: NORMAL

## 2022-07-21 PROCEDURE — 81000 URINALYSIS NONAUTO W/SCOPE: CPT | Performed by: STUDENT IN AN ORGANIZED HEALTH CARE EDUCATION/TRAINING PROGRAM

## 2022-07-21 PROCEDURE — 63600175 PHARM REV CODE 636 W HCPCS: Performed by: STUDENT IN AN ORGANIZED HEALTH CARE EDUCATION/TRAINING PROGRAM

## 2022-07-21 PROCEDURE — 81025 URINE PREGNANCY TEST: CPT | Performed by: STUDENT IN AN ORGANIZED HEALTH CARE EDUCATION/TRAINING PROGRAM

## 2022-07-21 PROCEDURE — 99284 EMERGENCY DEPT VISIT MOD MDM: CPT | Mod: 25

## 2022-07-21 PROCEDURE — 96372 THER/PROPH/DIAG INJ SC/IM: CPT | Performed by: STUDENT IN AN ORGANIZED HEALTH CARE EDUCATION/TRAINING PROGRAM

## 2022-07-21 RX ORDER — CYCLOBENZAPRINE HCL 10 MG
10 TABLET ORAL 3 TIMES DAILY PRN
Qty: 15 TABLET | Refills: 0 | Status: SHIPPED | OUTPATIENT
Start: 2022-07-21 | End: 2022-07-26

## 2022-07-21 RX ORDER — DEXAMETHASONE SODIUM PHOSPHATE 4 MG/ML
8 INJECTION, SOLUTION INTRA-ARTICULAR; INTRALESIONAL; INTRAMUSCULAR; INTRAVENOUS; SOFT TISSUE
Status: COMPLETED | OUTPATIENT
Start: 2022-07-21 | End: 2022-07-21

## 2022-07-21 RX ADMIN — DEXAMETHASONE SODIUM PHOSPHATE 8 MG: 4 INJECTION INTRA-ARTICULAR; INTRALESIONAL; INTRAMUSCULAR; INTRAVENOUS; SOFT TISSUE at 11:07

## 2022-07-21 NOTE — Clinical Note
"Chris Kongs" Ana Cristina was seen and treated in our emergency department on 7/21/2022.  She may return to work on 07/23/2022.       If you have any questions or concerns, please don't hesitate to call.      Garima Funes, DO"

## 2022-07-22 NOTE — ED PROVIDER NOTES
Encounter Date: 7/21/2022    SCRIBE #1 NOTE: I, Ruddy Pate, am scribing for, and in the presence of,  Garima Funes DO. I have scribed the following portions of the note - Other sections scribed: HPI, ROS, PE.       History     Chief Complaint   Patient presents with    Back Pain     Pt reports lower back pain started today. Denies injury, hematuria, dysuria.      Chris De La Paz is a 56 y. o female with a PMHx of HTN, and stroke, that comes to the ED complaining of left lower back pain described as an 8/10 beginning today. Patient reports complaints of left lower back pain, endorsing previous episodes in the past, noting she usually gets around 2 per year, but hasn't gotten one in a while. Patient endorses steroid shot and muscle relaxers usually bring her relief during her episodes. Compliant with antihypertensives. No other medication taken PTA. No alleviating or exacerbating factors noted. Denies fever, chills, CP, SOB, abdominal pain, N/V/D, numbness, weakness, or headache. Allergic to ace inhibitors.    The history is provided by the patient. No  was used.     Review of patient's allergies indicates:   Allergen Reactions    Ace inhibitors Other (See Comments)     Angioedema       Past Medical History:   Diagnosis Date    Hypertension     Stroke      Past Surgical History:   Procedure Laterality Date    TUBAL LIGATION       Family History   Problem Relation Age of Onset    Hypertension Mother     Hypotension Father      Social History     Tobacco Use    Smoking status: Current Every Day Smoker     Packs/day: 0.50    Smokeless tobacco: Never Used   Substance Use Topics    Alcohol use: Yes     Comment: socially    Drug use: Yes     Types: Marijuana     Comment: occ     Review of Systems   Constitutional: Negative for chills and fever.   HENT: Negative for congestion and sore throat.    Eyes: Negative for visual disturbance.   Respiratory: Negative for cough and shortness  of breath.    Cardiovascular: Negative for chest pain.   Gastrointestinal: Negative for abdominal pain, diarrhea, nausea and vomiting.   Genitourinary: Negative for dysuria and hematuria.   Musculoskeletal: Positive for back pain (left lower). Negative for neck pain and neck stiffness.   Skin: Negative for rash.   Neurological: Negative for syncope, weakness, numbness and headaches.   Psychiatric/Behavioral: Negative for confusion.       Physical Exam     Initial Vitals [07/21/22 2026]   BP Pulse Resp Temp SpO2   (!) 197/106 87 20 98.6 °F (37 °C) 95 %      MAP       --         Physical Exam    Nursing note and vitals reviewed.  Constitutional: She appears well-developed and well-nourished. She is not diaphoretic.  Non-toxic appearance. She does not have a sickly appearance. She does not appear ill.   HENT:   Head: Normocephalic and atraumatic.   Right Ear: External ear normal.   Left Ear: External ear normal.   Mouth/Throat: Oropharynx is clear and moist.   Eyes: Conjunctivae are normal. No scleral icterus.   Neck: Neck supple. No JVD present.   Normal range of motion.  Cardiovascular: Normal rate, regular rhythm, normal heart sounds and intact distal pulses.   Pulmonary/Chest: Breath sounds normal. No respiratory distress.   Abdominal: Abdomen is soft. Bowel sounds are normal. She exhibits no distension. There is no abdominal tenderness.   Musculoskeletal:         General: No tenderness or edema. Normal range of motion.      Cervical back: Normal range of motion and neck supple.     Neurological: She is alert and oriented to person, place, and time. She has normal strength. She displays no atrophy, no tremor and normal reflexes. No cranial nerve deficit or sensory deficit. She exhibits normal muscle tone. She displays no seizure activity. Gait normal. GCS score is 15. GCS eye subscore is 4. GCS verbal subscore is 5. GCS motor subscore is 6.   Moves all extremities, follows all commands, no focal neurologic  deficits.    Skin: Skin is warm and dry. Capillary refill takes less than 2 seconds. No rash noted. No erythema.   Psychiatric: She has a normal mood and affect. Thought content normal.         ED Course   Procedures  Labs Reviewed   URINALYSIS, REFLEX TO URINE CULTURE - Abnormal; Notable for the following components:       Result Value    Ketones, UA Trace (*)     Leukocytes, UA 1+ (*)     All other components within normal limits    Narrative:     Specimen Source->Urine   URINALYSIS MICROSCOPIC    Narrative:     Specimen Source->Urine   POCT URINE PREGNANCY          Imaging Results          X-Ray Lumbar Spine Ap And Lateral (Final result)  Result time 07/21/22 22:20:38    Final result by Candy Wilhelm MD (07/21/22 22:20:38)                 Impression:      No acute bony abnormality detected.  Degenerative changes.      Electronically signed by: Candy Wilhelm  Date:    07/21/2022  Time:    22:20             Narrative:    EXAMINATION:  LUMBAR SPINE    CLINICAL HISTORY:  Low back pain.    TECHNIQUE:  AP, lateral, and coned lateral views of the lower lumbar spine were submitted.    COMPARISON:  02/25/2021    FINDINGS:  There is mild dextroscoliosis of the lumbar spine. There is no acute fracture.  There is grade 1 retrolisthesis of L5 on S1.  There is small marginal osteophytes throughout.  Mild intervertebral disc space narrowing is also seen at L5/S1.  There is facet arthrosis seen throughout.                                 Medications   dexamethasone injection 8 mg (has no administration in time range)     Medical Decision Making:   History:   Old Medical Records: I decided to obtain old medical records.  Clinical Tests:   Lab Tests: Ordered and Reviewed  Radiological Study: Ordered and Reviewed  ED Management:   [facility]  This is an emergent evaluation of a 56 y. o female with a PMHx of HTN, and stroke presents left lower back pain described as an 8/10 beginning today. Initial vitals in the ED with a blood  pressure of 197/106 with remainder of vitals unremarkable. Physical exam noted above. DDx includes but is not limited to sciatica, arthritis, musculoskeletal pain, UTI, pyelonephritis. Also considered but clinically less likely to be cauda equina, epidural abscess, spinal stenosis, malignancy, dissection, hypertensive emergency. Labs and imaging including UPT, UA, and xray of the lumbar spine. She requested a steroid shot, which she states usually improves her pain. She was treated with an IM steroid shot. On reassessment, her symptoms were improved. Her blood pressure was also improved. Given benign exam and work-up, I feel symptoms are less likely to be a life threatening cause at this time. Will discharge with Flexeril, orthopedic and PCP follow-up and ED return precautions. Patient is aware of plan and is amenable.     Garima Funes D.O  EMERGENCY MEDICINE  11:54 PM 07/21/2022          Scribe Attestation:   Scribe #1: I performed the above scribed service and the documentation accurately describes the services I performed. I attest to the accuracy of the note.                 Clinical Impression:   Final diagnoses:  [M54.50] Acute left-sided low back pain without sciatica (Primary)       I, Garima Funes, DO, personally performed the services described in this documentation. All medical record entries made by the scribe were at my direction and in my presence. I have reviewed the chart and agree that the record reflects my personal performance and is accurate and complete.     ED Disposition Condition    Discharge Stable        ED Prescriptions     Medication Sig Dispense Start Date End Date Auth. Provider    cyclobenzaprine (FLEXERIL) 10 MG tablet Take 1 tablet (10 mg total) by mouth 3 (three) times daily as needed for Muscle spasms. 15 tablet 7/21/2022 7/26/2022 Garima Funes, DO        Follow-up Information     Follow up With Specialties Details Why Contact Info    Ammy Jordan  MD Family Medicine, Wound Care Schedule an appointment as soon as possible for a visit on 7/25/2022 Emergency Room Follow-up [address] [place] [address]  [phone]      [facility] [place] [place] Emergency Dept Emergency Medicine Go to  If symptoms worsen [address] [facility] [id number] [phone]    [facility] Orthopedics Schedule an appointment as soon as possible for a visit in 1 week Emergency Room Follow-up [address] [facility] [address]  [phone]           Garima Funes,   08/03/22 1224

## 2022-07-22 NOTE — FIRST PROVIDER EVALUATION
Emergency Department TeleTriage Encounter Note      CHIEF COMPLAINT    Chief Complaint   Patient presents with    Back Pain     Pt reports lower back pain started today. Denies injury, hematuria, dysuria.        VITAL SIGNS   Initial Vitals [07/21/22 2026]   BP Pulse Resp Temp SpO2   (!) 197/106 87 20 98.6 °F (37 °C) 95 %      MAP       --            ALLERGIES    Review of patient's allergies indicates:   Allergen Reactions    Ace inhibitors Other (See Comments)     Angioedema         PROVIDER TRIAGE NOTE  56-year-old female with low back pain.  No trauma injury.  Reports similar pain frequently a few times a year.  Did not take any medication prior to arrival.  No other complaints.      ORDERS  Labs Reviewed - No data to display    ED Orders (720h ago, onward)    Start Ordered     Status Ordering Provider    07/21/22 2112 07/21/22 2111  X-Ray Lumbar Spine Ap And Lateral  1 time imaging         Ordered PARAS CARBALLO            Virtual Visit Note: The provider triage portion of this emergency department evaluation and documentation was performed via [email or web address], a HIPAA-compliant telemedicine application, in concert with a tele-presenter in the room. A face to face patient evaluation with one of my colleagues will occur once the patient is placed in an emergency department room.      DISCLAIMER: This note was prepared with [email or web address] voice recognition transcription software. Garbled syntax, mangled pronouns, and other bizarre constructions may be attributed to that software system.

## 2022-07-22 NOTE — DISCHARGE INSTRUCTIONS
Thank you for coming to our Emergency Department today. It is important to remember that some problems or medical conditions are difficult to diagnose and may not be found during your Emergency Department visit.     Be sure to follow up with your primary care doctor and review all labs/imaging/tests that were performed during your ER visit with them. Some labs/tests may be outside of the normal range and require non-emergent follow-up and further investigation to help diagnose/exclude/prevent complications or other potentially serious medical conditions that were not addressed during your ER visit.    If you do not have a primary care doctor, you may contact the one listed on your discharge paperwork or you may also call the [facility] Appointment Desk at [phone] to schedule an appointment and establish care with one. It is important to your health that you have a primary care doctor.    Please take all medications as directed. All medications may potentially have side-effects and it is impossible to predict which medications may give you side-effects or what side-effects (if any) they will give you.. If you feel that you are having a negative effect or side-effect of any medication you should immediately stop taking them and seek medical attention. If you feel that you are having a life-threatening reaction call 911.    Return to the ER with any questions/concerns, new/concerning symptoms, worsening or failure to improve.     Do not drive, swim, climb to height, take a bath, operate heavy machinery, drink alcohol or take potentially sedating medications, sign any legal documents or make any important decisions for 24 hours if you have received any pain medications, sedatives or mood altering drugs during your ER visit or within 24 hours of taking them if they have been prescribed to you.     You can find additional resources for Dentists, hearing aids, durable medical equipment, low cost pharmacies and  other resources at https://[facility].org    BELOW THIS LINE ONLY APPLIES IF YOU HAVE A COVID TEST PENDING OR IF YOU HAVE BEEN DIAGNOSED WITH COVID:  Please access [facility] to review the results of your test. Until the results of your COVID test return, you should isolate yourself so as not to potentially spread illness to others.   If your COVID test returns positive, you should isolate yourself so as not to spread illness to others. After five full days, if you are feeling better and you have not had fever for 24 hours, you can return to your typical daily activities, but you must wear a mask around others for an additional 5 days.   If your COVID test returns negative and you are either unvaccinated or more than six months out from your two-dose vaccine and are not yet boosted, you should still quarantine for 5 full days followed by strict mask use for an additional 5 full days.   If your COVID test returns negative and you have received your 2-dose initial vaccine as well as a booster, you should continue strict mask use for 10 full days after the exposure.  For all those exposed, best practice includes a test at day 5 after the exposure. This can be a home test or a test through one of the many testing centers throughout our community.   Masking is always advised to limit the spread of COVID. Cdc.gov is an excellent site to obtain the latest up to date recommendations regarding COVID and COVID testing.     CDC Testing and Quarantine Guidelines for patients with exposure to a known-positive COVID-19 person:  A close exposure is defined as anyone who has had an exposure (masked or unmasked) to a known COVID -19 positive person within 6 feet of someone for a cumulative total of 15 minutes or more over a 24-hour period.   Vaccinated and/or if you recently had a positive covid test within 90 days do NOT need to quarantine after contact with someone who had COVID-19 unless you develop symptoms.   Fully vaccinated  people who have not had a positive test within 90 days, should get tested 3-5 days after their exposure, even if they don't have symptoms and wear a mask indoors in public for 14 days following exposure or until their test result is negative.      Unvaccinated and/or NOT had a positive test within 90 days and meet close exposure  You are required by CDC guidelines to quarantine for at least 5 days from time of exposure followed by 5 days of strict masking. It is recommended, but not required to test after 5 days, unless you develop symptoms, in which case you should test at that time.  If you get tested after 5 days and your test is positive, your 5 day period of isolation starts the day of the positive test.    If your exposure does not meet the above definition, you can return to your normal daily activities to include social distancing, wearing a mask and frequent handwashing.      Here is a link to guidance from the CDC:  https://www.cdc.gov/media/releases/2021/s1227-isolation-quarantine-guidance.html      [place] Dept Of Health Testing Sites:  https://ldh.la.gov/page/3934      [facility] website with testing locations and guidance:  [email or web address]

## 2023-02-21 ENCOUNTER — HOSPITAL ENCOUNTER (EMERGENCY)
Facility: HOSPITAL | Age: 58
Discharge: HOME | End: 2023-02-21
Attending: EMERGENCY MEDICINE
Payer: MEDICAID

## 2023-02-21 VITALS
RESPIRATION RATE: 18 BRPM | BODY MASS INDEX: 23.4 KG/M2 | OXYGEN SATURATION: 100 % | HEART RATE: 95 BPM | WEIGHT: 145 LBS | TEMPERATURE: 99 F | DIASTOLIC BLOOD PRESSURE: 104 MMHG | SYSTOLIC BLOOD PRESSURE: 198 MMHG

## 2023-02-21 DIAGNOSIS — U07.1 COVID-19: Primary | ICD-10-CM

## 2023-02-21 LAB
B-HCG UR QL: NEGATIVE
CTP QC/QA: YES
MOLECULAR STREP A: NEGATIVE
POC MOLECULAR INFLUENZA A AGN: NEGATIVE
POC MOLECULAR INFLUENZA B AGN: NEGATIVE
SARS-COV-2 RDRP RESP QL NAA+PROBE: POSITIVE

## 2023-02-21 PROCEDURE — 87502 INFLUENZA DNA AMP PROBE: CPT

## 2023-02-21 PROCEDURE — 81025 URINE PREGNANCY TEST: CPT

## 2023-02-21 PROCEDURE — 25000003 PHARM REV CODE 250

## 2023-02-21 PROCEDURE — 87651 STREP A DNA AMP PROBE: CPT

## 2023-02-21 PROCEDURE — 99284 EMERGENCY DEPT VISIT MOD MDM: CPT

## 2023-02-21 RX ORDER — HYDROCHLOROTHIAZIDE 25 MG/1
50 TABLET ORAL
Status: COMPLETED | OUTPATIENT
Start: 2023-02-21 | End: 2023-02-21

## 2023-02-21 RX ORDER — BENZONATATE 100 MG/1
100 CAPSULE ORAL 3 TIMES DAILY PRN
Qty: 30 CAPSULE | Refills: 0 | OUTPATIENT
Start: 2023-02-21 | End: 2023-11-12

## 2023-02-21 RX ORDER — GUAIFENESIN 100 MG/5ML
100-200 SOLUTION ORAL EVERY 4 HOURS PRN
Qty: 118 ML | Refills: 0 | Status: SHIPPED | OUTPATIENT
Start: 2023-02-21

## 2023-02-21 RX ORDER — IBUPROFEN 600 MG/1
600 TABLET ORAL EVERY 6 HOURS PRN
Qty: 20 TABLET | Refills: 0 | OUTPATIENT
Start: 2023-02-21 | End: 2023-11-12

## 2023-02-21 RX ORDER — FLUTICASONE PROPIONATE 50 MCG
1 SPRAY, SUSPENSION (ML) NASAL 2 TIMES DAILY PRN
Qty: 15 G | Refills: 0 | OUTPATIENT
Start: 2023-02-21 | End: 2023-11-12

## 2023-02-21 RX ORDER — IBUPROFEN 600 MG/1
600 TABLET ORAL
Status: COMPLETED | OUTPATIENT
Start: 2023-02-21 | End: 2023-02-21

## 2023-02-21 RX ORDER — ACETAMINOPHEN 500 MG
500 TABLET ORAL EVERY 6 HOURS PRN
Qty: 20 TABLET | Refills: 0 | OUTPATIENT
Start: 2023-02-21 | End: 2023-05-08

## 2023-02-21 RX ORDER — CETIRIZINE HYDROCHLORIDE 10 MG/1
10 TABLET ORAL DAILY PRN
Qty: 30 TABLET | Refills: 0 | Status: SHIPPED | OUTPATIENT
Start: 2023-02-21

## 2023-02-21 RX ADMIN — HYDROCHLOROTHIAZIDE 50 MG: 25 TABLET ORAL at 03:02

## 2023-02-21 RX ADMIN — IBUPROFEN 600 MG: 600 TABLET ORAL at 03:02

## 2023-02-21 NOTE — ED PROVIDER NOTES
Encounter Date: 2/21/2023    SCRIBE #1 NOTE: I, LENCHO CARY, am scribing for, and in the presence of,  Alayna Holdsworth, PA-C. I have scribed the following portions of the note - Other sections scribed: HPI, ROS.     History     Chief Complaint   Patient presents with    Sore Throat     Pt c/o headache, chills, and sore throat x 1 day.      57-year-old female, with a past medical history of HTN and stroke, presents to the ED with URI symptoms onset yesterday. Patient states that she has been having a sore throat, headaches, loose stools, chills, congestion, dry cough, generalized myalgias, and light-headedness. Endorses taking DayQuil in attempt to alleviate symptoms with minimal relief. Denies any recent sick contact. No other exacerbating or alleviating factors. Denies any fever, SOB, CP, rhinorrhea, nausea, emesis, abdominal pain, constipation, dysuria, rashes, or other associated symptoms.     The history is provided by the patient. No  was used.   Review of patient's allergies indicates:   Allergen Reactions    Ace inhibitors Other (See Comments)     Angioedema       Past Medical History:   Diagnosis Date    Hypertension     Stroke      Past Surgical History:   Procedure Laterality Date    TUBAL LIGATION       Family History   Problem Relation Age of Onset    Hypertension Mother     Hypotension Father      Social History     Tobacco Use    Smoking status: Every Day     Packs/day: 0.50     Types: Cigarettes    Smokeless tobacco: Never   Substance Use Topics    Alcohol use: Yes     Comment: socially    Drug use: Yes     Types: Marijuana     Comment: occ     Review of Systems   Constitutional:  Positive for chills and diaphoresis. Negative for fatigue and fever.   HENT:  Positive for congestion and sore throat. Negative for ear pain, rhinorrhea, sinus pressure, sneezing and trouble swallowing.    Respiratory:  Positive for cough. Negative for shortness of breath.    Cardiovascular:  Negative  for chest pain.   Gastrointestinal:  Negative for abdominal pain, constipation, diarrhea, nausea and vomiting.        (+) loose stools   Genitourinary:  Negative for decreased urine volume, difficulty urinating, dysuria, frequency, hematuria and urgency.   Musculoskeletal:  Positive for myalgias (body aches). Negative for arthralgias.   Skin:  Negative for rash.   Neurological:  Positive for light-headedness and headaches. Negative for dizziness, weakness and numbness.     Physical Exam     Initial Vitals [02/21/23 1348]   BP Pulse Resp Temp SpO2   (!) 192/98 107 19 100 °F (37.8 °C) 96 %      MAP       --         Physical Exam    Nursing note and vitals reviewed.  Constitutional: She appears well-developed and well-nourished. She is not diaphoretic. She is active. She does not appear ill. No distress.   HENT:   Head: Normocephalic and atraumatic.   Right Ear: External ear normal.   Left Ear: External ear normal.   Nose: Nose normal.   Mouth/Throat: Uvula is midline, oropharynx is clear and moist and mucous membranes are normal.   Eyes: Conjunctivae, EOM and lids are normal. Pupils are equal, round, and reactive to light. Right eye exhibits no discharge. Left eye exhibits no discharge.   Neck: Neck supple.   Normal range of motion.   Full passive range of motion without pain.     Cardiovascular:  Normal rate and regular rhythm.           Pulmonary/Chest: Effort normal and breath sounds normal. No respiratory distress.   Abdominal: She exhibits no distension.   Musculoskeletal:         General: Normal range of motion.      Cervical back: Full passive range of motion without pain, normal range of motion and neck supple.     Neurological: She is alert and oriented to person, place, and time. GCS eye subscore is 4. GCS verbal subscore is 5. GCS motor subscore is 6.   Skin: Skin is dry. Capillary refill takes less than 2 seconds.       ED Course   Procedures  Labs Reviewed   SARS-COV-2 RDRP GENE - Abnormal; Notable for  the following components:       Result Value    POC Rapid COVID Positive (*)     All other components within normal limits   POCT INFLUENZA A/B MOLECULAR   POCT STREP A MOLECULAR   POCT URINE PREGNANCY          Imaging Results    None          Medications   ibuprofen tablet 600 mg (600 mg Oral Given 2/21/23 1503)   hydroCHLOROthiazide tablet 50 mg (50 mg Oral Given 2/21/23 1503)     Medical Decision Making:   History:   Old Medical Records: I decided to obtain old medical records.  Initial Assessment:   57-year-old female, with a past medical history of HTN and stroke, presents to the ED with URI symptoms.  Patient's chart and medical history reviewed.  Differential Diagnosis:   COVID  Influenza  Strep pharyngitis  Viral pharyngitis  Og's angina  Retropharyngeal abscess  Tonsillar abscess  Clinical Tests:   Lab Tests: Ordered and Reviewed  ED Management:  Patient's vitals reviewed.  She is afebrile, no respiratory distress, nontoxic-appearing in the ED. patient's physical exam was unremarkable.  UPT was negative.  Patient given Motrin.  Patient's blood pressure is elevated today, she states she has not taken her home blood pressure medicine of hydrochlorothiazide 50 mg today.  Offered patient to give her a dose here or she can go home and take it, she states she would like a dose here.  Patient is strep and flu negative. Patient is COVID positive. Patient's O2 sat is 96% on room air with no respiratory distress and bilateral normal breath sounds. Discussed with patient she will need to quarantine for the next 5 days or until she is afebrile for 24 hours without taking any medications that would lower her temperature or any new symptoms developing. Discussed with patient she can use Tylenol and ibuprofen as needed for fevers and headaches, as well as staying hydrated and resting until this clears from her system.  Patient will be sent normal Motrin, Tylenol, Flonase, Zyrtec, guaifenesin cough syrup, Tessalon  Perles, and benzocaine lozenges for symptomatic control.  Patient will follow-up with PCP.  Discussed with patient strict return precautions, she verbalized understanding.  Patient agrees with this plan.  Patient is stable for discharge.        Scribe Attestation:   Scribe #1: I performed the above scribed service and the documentation accurately describes the services I performed. I attest to the accuracy of the note.                 Scribe attestation: I, Alayna Holdsworth,PA-C, personally performed the services described in this documentation. All medical record entries made by the scribe were at my direction and in my presence.  I have reviewed the chart and agree that the record reflects my personal performance and is accurate and complete.   Clinical Impression:   Final diagnoses:  [U07.1] COVID-19 (Primary)        ED Disposition Condition    Discharge Stable          ED Prescriptions       Medication Sig Dispense Start Date End Date Auth. Provider    ibuprofen (ADVIL,MOTRIN) 600 MG tablet Take 1 tablet (600 mg total) by mouth every 6 (six) hours as needed for Pain. 20 tablet 2/21/2023 -- Alayna Holdsworth, PA-C    acetaminophen (TYLENOL) 500 MG tablet Take 1 tablet (500 mg total) by mouth every 6 (six) hours as needed for Temperature greater than or Pain. 20 tablet 2/21/2023 -- Alayna Holdsworth, PA-C    fluticasone propionate (FLONASE) 50 mcg/actuation nasal spray 1 spray (50 mcg total) by Each Nostril route 2 (two) times daily as needed for Rhinitis or Allergies. 15 g 2/21/2023 -- Alayna Holdsworth, PA-C    cetirizine (ZYRTEC) 10 MG tablet Take 1 tablet (10 mg total) by mouth daily as needed for Allergies or Rhinitis. 30 tablet 2/21/2023 -- Alayna Holdsworth, PA-C    guaiFENesin 100 mg/5 ml (ROBITUSSIN) 100 mg/5 mL syrup Take 5-10 mLs (100-200 mg total) by mouth every 4 (four) hours as needed for Cough or Congestion. 118 mL 2/21/2023 -- Alayna Holdsworth, PA-C    benzonatate (TESSALON) 100 MG capsule Take 1  capsule (100 mg total) by mouth 3 (three) times daily as needed for Cough. 30 capsule 2/21/2023 -- Alayna Holdsworth, PA-C    benzocaine-menthoL 6-10 mg lozenge Take 1 lozenge by mouth every 2 (two) hours as needed for Pain (sore throat). 18 tablet 2/21/2023 -- Alayna Holdsworth, PA-C          Follow-up Information       Follow up With Specialties Details Why Contact Info    Ammy Jordan MD Family Medicine, Wound Care   [address] [place], [place] [address] [id number]  [phone]               Alayna Holdsworth, PA-C  02/21/23 8425

## 2023-02-21 NOTE — DISCHARGE INSTRUCTIONS
Thank you for coming to our Emergency Department today. It is important to remember that some problems are difficult to diagnose and may not be found during your first visit. Be sure to follow up with your primary care doctor and review any labs/imaging that was performed with them. If you do not have a primary care doctor, you may contact the one listed on your discharge paperwork or you may also call the [facility] Appointment Desk at [phone] to schedule an appointment with one.     All medications may potentially have side effects and it is impossible to predict which medications may give you side effects. If you feel that you are having a negative effect of any medication you should immediately stop taking them and seek medical attention.    Return to the ER with any questions/concerns, new/concerning symptoms, worsening or failure to improve. Do not drive or make any important decisions for 24 hours if you have received any pain medications, sedatives or mood altering drugs during your ER visit.

## 2023-02-21 NOTE — Clinical Note
"Chris"Tai De La Paz was seen and treated in our emergency department on 2/21/2023.  She may return to work on 02/26/2023.  Patient tested positive for COVID on 02/21/2023. She may return to work after 5 days of quarantine as long as she is fever free for 24 hours without taking medications that would lower her temperature or any new symptoms arising.     If you have any questions or concerns, please don't hesitate to call.      Alayna Holdsworth, PA-C"

## 2023-02-21 NOTE — Clinical Note
"Chris"Tai De La Paz was seen and treated in our emergency department on 2/21/2023.  She may return to work on 02/27/2023.  Patient tested positive for COVID on 02/21/2023. She may return to work after 5 days of quarantine as long as she is fever free for 24 hours without taking medications that would lower her temperature or any new symptoms arising.     If you have any questions or concerns, please don't hesitate to call.      Alayna Holdsworth, PA-C"

## 2023-02-21 NOTE — FIRST PROVIDER EVALUATION
Emergency Department TeleTriage Encounter Note      CHIEF COMPLAINT    Chief Complaint   Patient presents with    Sore Throat     Pt c/o headache, chills, and sore throat x 1 day.        VITAL SIGNS   Initial Vitals [02/21/23 1348]   BP Pulse Resp Temp SpO2   (!) 192/98 107 19 100 °F (37.8 °C) 96 %      MAP       --            ALLERGIES    Review of patient's allergies indicates:   Allergen Reactions    Ace inhibitors Other (See Comments)     Angioedema         PROVIDER TRIAGE NOTE  This is a teletriage evaluation of a 57 y.o. female presenting to the ED complaining of URI symptoms. Patient reports fever, cough, congestion, sore throat, headache, and chills since yesterday.    Patient is alert and oriented. She speaks in complete sentences. She is sitting upright in the chair in no distress.     Initial orders will be placed and care will be transferred to an alternate provider when patient is roomed for a full evaluation. Any additional orders and the final disposition will be determined by that provider.         ORDERS  Labs Reviewed   GROUP A STREP, MOLECULAR   SARS-COV-2 RDRP GENE   POCT INFLUENZA A/B MOLECULAR       ED Orders (720h ago, onward)      Start Ordered     Status Ordering Provider    02/21/23 1357 02/21/23 1356  POCT Influenza A/B Molecular  Once         Ordered BLAINE BUTCHER.    02/21/23 1357 02/21/23 1356  Group A Strep, Molecular  STAT         Ordered BLAINE BUTCHER.    02/21/23 1356 02/21/23 1356  POCT COVID-19 Rapid Screening  Once         Ordered BLAINE BUTCHER    02/21/23 1356 02/21/23 1356  Airborne and Contact and Droplet Isolation Status  Continuous         Ordered BLAINE BUTCHER              Virtual Visit Note: The provider triage portion of this emergency department evaluation and documentation was performed via [employer], a HIPAA-compliant telemedicine application, in concert with a tele-presenter in the room. A face to face patient evaluation with one of my colleagues will occur  once the patient is placed in an emergency department room.      DISCLAIMER: This note was prepared with [employer] voice recognition transcription software. Garbled syntax, mangled pronouns, and other bizarre constructions may be attributed to that software system.

## 2023-05-08 ENCOUNTER — HOSPITAL ENCOUNTER (EMERGENCY)
Facility: HOSPITAL | Age: 58
Discharge: HOME | End: 2023-05-08
Attending: EMERGENCY MEDICINE
Payer: MEDICAID

## 2023-05-08 VITALS
BODY MASS INDEX: 23.3 KG/M2 | OXYGEN SATURATION: 100 % | DIASTOLIC BLOOD PRESSURE: 92 MMHG | SYSTOLIC BLOOD PRESSURE: 176 MMHG | RESPIRATION RATE: 18 BRPM | WEIGHT: 145 LBS | TEMPERATURE: 99 F | HEIGHT: 66 IN | HEART RATE: 89 BPM

## 2023-05-08 DIAGNOSIS — M54.41 ACUTE RIGHT-SIDED LOW BACK PAIN WITH RIGHT-SIDED SCIATICA: ICD-10-CM

## 2023-05-08 DIAGNOSIS — N30.01 ACUTE CYSTITIS WITH HEMATURIA: Primary | ICD-10-CM

## 2023-05-08 LAB
B-HCG UR QL: NEGATIVE
BACTERIA #/AREA URNS HPF: ABNORMAL /HPF
BILIRUB UR QL STRIP: NEGATIVE
CLARITY UR: ABNORMAL
COLOR UR: YELLOW
CTP QC/QA: YES
GLUCOSE UR QL STRIP: NEGATIVE
HGB UR QL STRIP: ABNORMAL
HYALINE CASTS #/AREA URNS LPF: 1 /LPF
KETONES UR QL STRIP: ABNORMAL
LEUKOCYTE ESTERASE UR QL STRIP: ABNORMAL
MICROSCOPIC COMMENT: ABNORMAL
NITRITE UR QL STRIP: NEGATIVE
PH UR STRIP: 6 [PH] (ref 5–8)
PROT UR QL STRIP: ABNORMAL
RBC #/AREA URNS HPF: 31 /HPF (ref 0–4)
SP GR UR STRIP: >1.03 (ref 1–1.03)
SQUAMOUS #/AREA URNS HPF: 7 /HPF
URN SPEC COLLECT METH UR: ABNORMAL
UROBILINOGEN UR STRIP-ACNC: ABNORMAL EU/DL
WBC #/AREA URNS HPF: 79 /HPF (ref 0–5)
WBC CLUMPS URNS QL MICRO: ABNORMAL

## 2023-05-08 PROCEDURE — 87086 URINE CULTURE/COLONY COUNT: CPT | Performed by: EMERGENCY MEDICINE

## 2023-05-08 PROCEDURE — 81025 URINE PREGNANCY TEST: CPT | Performed by: EMERGENCY MEDICINE

## 2023-05-08 PROCEDURE — 99284 EMERGENCY DEPT VISIT MOD MDM: CPT

## 2023-05-08 PROCEDURE — 81000 URINALYSIS NONAUTO W/SCOPE: CPT | Performed by: EMERGENCY MEDICINE

## 2023-05-08 PROCEDURE — 96372 THER/PROPH/DIAG INJ SC/IM: CPT

## 2023-05-08 PROCEDURE — 63600175 PHARM REV CODE 636 W HCPCS

## 2023-05-08 RX ORDER — NAPROXEN 500 MG/1
500 TABLET ORAL 2 TIMES DAILY WITH MEALS
Qty: 10 TABLET | Refills: 0 | Status: SHIPPED | OUTPATIENT
Start: 2023-05-08 | End: 2023-05-08 | Stop reason: SDUPTHER

## 2023-05-08 RX ORDER — PHENAZOPYRIDINE HYDROCHLORIDE 200 MG/1
200 TABLET, FILM COATED ORAL
Qty: 9 TABLET | Refills: 0 | Status: SHIPPED | OUTPATIENT
Start: 2023-05-08 | End: 2023-05-11

## 2023-05-08 RX ORDER — CEPHALEXIN 500 MG/1
500 CAPSULE ORAL 4 TIMES DAILY
Qty: 20 CAPSULE | Refills: 0 | Status: SHIPPED | OUTPATIENT
Start: 2023-05-08 | End: 2023-05-08 | Stop reason: SDUPTHER

## 2023-05-08 RX ORDER — LIDOCAINE 50 MG/G
1 PATCH TOPICAL DAILY
Qty: 15 PATCH | Refills: 0 | Status: SHIPPED | OUTPATIENT
Start: 2023-05-08 | End: 2023-05-08 | Stop reason: SDUPTHER

## 2023-05-08 RX ORDER — PHENAZOPYRIDINE HYDROCHLORIDE 200 MG/1
200 TABLET, FILM COATED ORAL
Qty: 9 TABLET | Refills: 0 | Status: SHIPPED | OUTPATIENT
Start: 2023-05-08 | End: 2023-05-08 | Stop reason: SDUPTHER

## 2023-05-08 RX ORDER — NAPROXEN 500 MG/1
500 TABLET ORAL 2 TIMES DAILY WITH MEALS
Qty: 10 TABLET | Refills: 0 | Status: SHIPPED | OUTPATIENT
Start: 2023-05-08 | End: 2023-05-13

## 2023-05-08 RX ORDER — ACETAMINOPHEN 500 MG
500 TABLET ORAL EVERY 6 HOURS PRN
Qty: 28 TABLET | Refills: 0 | Status: SHIPPED | OUTPATIENT
Start: 2023-05-08 | End: 2023-05-15

## 2023-05-08 RX ORDER — LIDOCAINE 50 MG/G
1 PATCH TOPICAL DAILY
Qty: 15 PATCH | Refills: 0 | Status: SHIPPED | OUTPATIENT
Start: 2023-05-08

## 2023-05-08 RX ORDER — CYCLOBENZAPRINE HCL 5 MG
5 TABLET ORAL 3 TIMES DAILY PRN
Qty: 15 TABLET | Refills: 0 | OUTPATIENT
Start: 2023-05-08 | End: 2023-12-09

## 2023-05-08 RX ORDER — ACETAMINOPHEN 500 MG
500 TABLET ORAL EVERY 6 HOURS PRN
Qty: 28 TABLET | Refills: 0 | Status: SHIPPED | OUTPATIENT
Start: 2023-05-08 | End: 2023-05-08 | Stop reason: SDUPTHER

## 2023-05-08 RX ORDER — CEPHALEXIN 500 MG/1
500 CAPSULE ORAL 4 TIMES DAILY
Qty: 20 CAPSULE | Refills: 0 | Status: SHIPPED | OUTPATIENT
Start: 2023-05-08 | End: 2023-05-13

## 2023-05-08 RX ORDER — KETOROLAC TROMETHAMINE 30 MG/ML
30 INJECTION, SOLUTION INTRAMUSCULAR; INTRAVENOUS
Status: COMPLETED | OUTPATIENT
Start: 2023-05-08 | End: 2023-05-08

## 2023-05-08 RX ORDER — CYCLOBENZAPRINE HCL 5 MG
5 TABLET ORAL 3 TIMES DAILY PRN
Qty: 15 TABLET | Refills: 0 | Status: SHIPPED | OUTPATIENT
Start: 2023-05-08 | End: 2023-05-08 | Stop reason: SDUPTHER

## 2023-05-08 RX ADMIN — KETOROLAC TROMETHAMINE 30 MG: 30 INJECTION, SOLUTION INTRAMUSCULAR; INTRAVENOUS at 09:05

## 2023-05-09 NOTE — DISCHARGE INSTRUCTIONS

## 2023-05-09 NOTE — ED PROVIDER NOTES
"Encounter Date: 5/8/2023    SCRIBE #1 NOTE: I, Boo Whitten, am scribing for, and in the presence of,  Luis Heaton PA-C.     History     Chief Complaint   Patient presents with    Back Pain     Lower back pain that radiates down to right side of buttocks with numbness. Denies any recent injuries states "I was doing some house work."      58 y/o female with HTN presents to the ED with R sided lower back pain with intermittent gluteal numbness for 4 days; she notes that she has had similar symptoms in the past and had relief with steroid injection and cyclobenzaprine. She has been taking acetaminophen for her symptoms. Her pain is exacerbated by standing and alleviated at rest. She denies fever, new lightheadedness, nausea, vomiting, diarrhea, dysuria, hematuria, urinary urgency, urinary frequency, or any other associated symptoms.     The history is provided by the patient. No  was used.   Review of patient's allergies indicates:   Allergen Reactions    Ace inhibitors Other (See Comments)     Angioedema       Past Medical History:   Diagnosis Date    Hypertension     Stroke      Past Surgical History:   Procedure Laterality Date    TUBAL LIGATION       Family History   Problem Relation Age of Onset    Hypertension Mother     Hypotension Father      Social History     Tobacco Use    Smoking status: Every Day     Packs/day: 0.50     Types: Cigarettes    Smokeless tobacco: Never   Substance Use Topics    Alcohol use: Yes     Comment: socially    Drug use: Yes     Types: Marijuana     Comment: occ     Review of Systems   Constitutional:  Negative for diaphoresis, fatigue and unexpected weight change.   HENT:  Negative for sinus pain and sore throat.    Eyes:  Negative for pain, redness and visual disturbance.   Respiratory:  Negative for cough, chest tightness, shortness of breath and wheezing.    Cardiovascular:  Negative for chest pain and palpitations.   Gastrointestinal:  Negative for abdominal " pain, blood in stool, diarrhea, nausea and vomiting.   Endocrine: Negative for polydipsia, polyphagia and polyuria.   Genitourinary:  Negative for dysuria, frequency and urgency.   Musculoskeletal:  Positive for back pain. Negative for arthralgias and myalgias.   Skin:  Negative for rash.   Allergic/Immunologic: Negative for environmental allergies.   Neurological:  Negative for dizziness, syncope and headaches.   Psychiatric/Behavioral:  Negative for suicidal ideas.      Physical Exam     Initial Vitals [05/08/23 2020]   BP Pulse Resp Temp SpO2   (S) (!) 169/93 100 (!) 22 98.6 °F (37 °C) 100 %      MAP       --         Physical Exam    Nursing note and vitals reviewed.  Constitutional: She appears well-developed and well-nourished. She is not diaphoretic. She is cooperative. She does not appear ill. No distress.   HENT:   Head: Normocephalic and atraumatic.   Right Ear: Hearing, tympanic membrane, external ear and ear canal normal.   Left Ear: Hearing, tympanic membrane, external ear and ear canal normal.   Nose: Rhinorrhea present. No mucosal edema or sinus tenderness. Right sinus exhibits no maxillary sinus tenderness and no frontal sinus tenderness. Left sinus exhibits no maxillary sinus tenderness and no frontal sinus tenderness.   Mouth/Throat: Oropharynx is clear and moist and mucous membranes are normal. No posterior oropharyngeal edema, posterior oropharyngeal erythema or tonsillar abscesses.   Eyes: Conjunctivae and EOM are normal. Pupils are equal, round, and reactive to light.   Neck: Neck supple.    Full passive range of motion without pain.     Cardiovascular:  Normal rate, regular rhythm, S1 normal, S2 normal, normal heart sounds and normal pulses.           No murmur heard.  Pulses:       Radial pulses are 2+ on the right side and 2+ on the left side.        Dorsalis pedis pulses are 2+ on the right side and 2+ on the left side.   Pulmonary/Chest: Effort normal and breath sounds normal. No accessory  muscle usage. No respiratory distress.   Abdominal: Abdomen is soft and flat. She exhibits no distension. There is no abdominal tenderness.   No right CVA tenderness.  No left CVA tenderness.   Musculoskeletal:      Cervical back: Full passive range of motion without pain and neck supple. No edema or rigidity. No muscular tenderness. Normal range of motion.        Legs:      Neurological: She is alert.   Skin: Skin is warm and dry. Capillary refill takes less than 2 seconds. No abrasion, no lesion and no rash noted.       ED Course   Procedures  Labs Reviewed   URINALYSIS, REFLEX TO URINE CULTURE - Abnormal; Notable for the following components:       Result Value    Appearance, UA Hazy (*)     Specific Gravity, UA >1.030 (*)     Protein, UA 1+ (*)     Ketones, UA Trace (*)     Occult Blood UA 1+ (*)     Urobilinogen, UA 4.0-6.0 (*)     Leukocytes, UA 3+ (*)     All other components within normal limits    Narrative:     Specimen Source->Urine   URINALYSIS MICROSCOPIC - Abnormal; Notable for the following components:    RBC, UA 31 (*)     WBC, UA 79 (*)     WBC Clumps, UA Occasional (*)     Bacteria Few (*)     All other components within normal limits    Narrative:     Specimen Source->Urine   CULTURE, URINE   POCT URINE PREGNANCY          Imaging Results              X-Ray Lumbar Spine Ap And Lateral (Final result)  Result time 05/08/23 21:10:30      Final result by Loni Jacob MD (05/08/23 21:10:30)                   Impression:      No acute lumbar spine abnormalities identified.      Electronically signed by: Loni Jacob MD  Date:    05/08/2023  Time:    21:10               Narrative:    EXAMINATION:  XR LUMBAR SPINE AP AND LATERAL    CLINICAL HISTORY:  back pain;    TECHNIQUE:  AP, lateral and spot images were performed of the lumbar spine.    COMPARISON:  July 2022.    FINDINGS:  Lumbar spine alignment is within normal limits.  No evidence of acute lumbar spine fracture or subluxation.  Multilevel  intervertebral disc space narrowing is seen, more pronounced at the L2-3 and L4-5 levels.  Visualized sacrum is unremarkable.                                    X-Rays:   Independently Interpreted Readings:   Other Readings:  X-ray lumbar spine AP and lateral:  Bones well mineralized.  Intervertebral disc spaces are well-maintained.  No acute fractures or dislocations.  Soft tissues are unremarkable.  Medications   ketorolac injection 30 mg (30 mg Intramuscular Given 5/8/23 2116)     Medical Decision Making:   Initial Assessment:   57-year-old female presenting to the emergency department with a chief complaint of back pain.  She reports this is a common problem and happens every few months.  Reports that these symptoms feel exactly like past episodes of back pain where NSAIDs and Flexeril alleviate her symptoms.  Denies any urinary symptoms, fever, nausea, or vomiting.  On physical exam, patient was clinically well-appearing and in no acute distress.  She was hypertensive but all other vital signs are within normal limits.  Reports that she forgot to take her blood pressure meds this morning.  Minimal tenderness to palpation in the musculature of the right upper buttock/low back.  No CVA tenderness.  Differential Diagnosis:   Differential diagnosis includes but is not limited to lumbago with or without sciatica, low back strain, contusion, fracture, compression fracture, pyelonephritis, dislocation, cauda equina syndrome, spinal epidural abscess, spinal epidural hematoma.   Independently Interpreted Test(s):   I have ordered and independently interpreted X-rays - see prior notes.  Clinical Tests:   Lab Tests: Ordered and Reviewed       <> Summary of Lab: Urinalysis with hazy appearance, greater than 1.03 specific gravity, 1+ protein, trace ketones, 1+ blood, 4-6 urobilinogen, 3+ leukocytes.  Microscopic UA with 79 WBCs, 31 RBCs, occasional WBC clumps, and few bacteria.    Urine culture pending.    UPT  negative.  Radiological Study: Ordered and Reviewed  ED Management:  Patient presenting to the emergency department with a chief complaint of right-sided back pain.  History and physical exam findings as above.  Urinalysis indicative of infection, however the patient denies any urinary symptoms.  No CVA tenderness, doubt pyelonephritis at this time.  Given urinalysis results, I will start the patient on Keflex for urinary tract infection.  Patient's back pain is similar to prior episodes, x-ray negative for acute findings, presentation consistent with lumbago with minimal sciatica.  Treated in the emergency department with 1 dose of Toradol.  Naproxen, Tylenol, Flexeril, lidocaine patches, Keflex, and phenazopyridine were electronically prescribed and sent to the patient's preferred pharmacy.    No red flags for cauda equina such as incontinence, urinary retention, saddle anesthesia, weakness, numbness, or neurological deficit.  No red flags for infection including fever, IV drug use, or immunocompromised state.  No red flags concerning for cancer such as weight loss, night sweats, pain worse at rest, or spinal tenderness.  Also considered but doubt vertebral fracture, aortic dissection, aortic aneurysm, herniated disc.    Return precautions were discussed, all patient questions were answered, and the patient was agreeable to the plan of care.  She was discharged home in stable condition and will follow up with her primary care provider or return to the emergency department if her symptoms worsen or do not improve.         Scribe Attestation:   Scribe #1: I performed the above scribed service and the documentation accurately describes the services I performed. I attest to the accuracy of the note.            I, Luis Heaton PA-C, personally performed the services described in this documentation.  All medical record entries made by the scribe were at my direction and in my presence.  I have reviewed the chart and  agree that the record reflects my personal performance and is accurate and complete.       Clinical Impression:   Final diagnoses:  [N30.01] Acute cystitis with hematuria (Primary)  [M54.41] Acute right-sided low back pain with right-sided sciatica        ED Disposition Condition    Discharge Stable          ED Prescriptions       Medication Sig Dispense Start Date End Date Auth. Provider    acetaminophen (TYLENOL) 500 MG tablet  (Status: Discontinued) Take 1 tablet (500 mg total) by mouth every 6 (six) hours as needed. 28 tablet 5/8/2023 5/8/2023 Luis Heaton PA-C    naproxen (NAPROSYN) 500 MG tablet  (Status: Discontinued) Take 1 tablet (500 mg total) by mouth 2 (two) times daily with meals. for 5 days 10 tablet 5/8/2023 5/8/2023 Luis Heaton PA-C    cyclobenzaprine (FLEXERIL) 5 MG tablet  (Status: Discontinued) Take 1 tablet (5 mg total) by mouth 3 (three) times daily as needed for Muscle spasms. 15 tablet 5/8/2023 5/8/2023 Luis Heaton PA-C    LIDOcaine (LIDODERM) 5 %  (Status: Discontinued) Place 1 patch onto the skin once daily. Remove & Discard patch within 12 hours or as directed by MD 15 patch 5/8/2023 5/8/2023 Luis Heaton PA-C    cephALEXin (KEFLEX) 500 MG capsule  (Status: Discontinued) Take 1 capsule (500 mg total) by mouth 4 (four) times daily. for 5 days 20 capsule 5/8/2023 5/8/2023 Luis Heaton PA-C    phenazopyridine (PYRIDIUM) 200 MG tablet  (Status: Discontinued) Take 1 tablet (200 mg total) by mouth 3 (three) times daily with meals. for 3 days 9 tablet 5/8/2023 5/8/2023 Luis Heaton PA-C    acetaminophen (TYLENOL) 500 MG tablet Take 1 tablet (500 mg total) by mouth every 6 (six) hours as needed for Pain. 28 tablet 5/8/2023 5/15/2023 Luis Heaton PA-C    cephALEXin (KEFLEX) 500 MG capsule Take 1 capsule (500 mg total) by mouth 4 (four) times daily. for 5 days 20 capsule 5/8/2023 5/13/2023 Luis Heaton PA-C    cyclobenzaprine (FLEXERIL) 5 MG tablet Take 1  tablet (5 mg total) by mouth 3 (three) times daily as needed for Muscle spasms. 15 tablet 5/8/2023 -- Luis Heaton PA-C    LIDOcaine (LIDODERM) 5 % Place 1 patch onto the skin once daily. Remove & Discard patch within 12 hours or as directed by MD 15 patch 5/8/2023 -- Luis Heaton PA-C    naproxen (NAPROSYN) 500 MG tablet Take 1 tablet (500 mg total) by mouth 2 (two) times daily with meals. for 5 days 10 tablet 5/8/2023 5/13/2023 Luis Heaton PA-C    phenazopyridine (PYRIDIUM) 200 MG tablet Take 1 tablet (200 mg total) by mouth 3 (three) times daily with meals. for 3 days 9 tablet 5/8/2023 5/11/2023 Luis Heaton PA-C          Follow-up Information       Follow up With Specialties Details Why Contact Info    Ammy Jordan MD Family Medicine, Wound Care Schedule an appointment as soon as possible for a visit  As needed, If symptoms worsen [address] [facility] [address]  [phone]      [facility] [place] Emergency Dept Emergency Medicine Go to  If symptoms worsen [address]  [facility] [address] [phone]             Luis Heaton PA-C  05/08/23 9110

## 2023-05-10 LAB — BACTERIA UR CULT: NORMAL

## 2023-11-12 ENCOUNTER — HOSPITAL ENCOUNTER (EMERGENCY)
Facility: HOSPITAL | Age: 58
Discharge: HOME | End: 2023-11-12
Attending: EMERGENCY MEDICINE
Payer: COMMERCIAL

## 2023-11-12 VITALS
OXYGEN SATURATION: 97 % | TEMPERATURE: 99 F | WEIGHT: 145 LBS | SYSTOLIC BLOOD PRESSURE: 134 MMHG | RESPIRATION RATE: 18 BRPM | HEIGHT: 67 IN | DIASTOLIC BLOOD PRESSURE: 70 MMHG | BODY MASS INDEX: 22.76 KG/M2 | HEART RATE: 108 BPM

## 2023-11-12 DIAGNOSIS — J06.9 VIRAL UPPER RESPIRATORY ILLNESS: ICD-10-CM

## 2023-11-12 DIAGNOSIS — N39.0 ACUTE URINARY TRACT INFECTION: Primary | ICD-10-CM

## 2023-11-12 PROBLEM — I63.9 CEREBROVASCULAR ACCIDENT: Status: ACTIVE | Noted: 2021-05-11

## 2023-11-12 LAB
BACTERIA #/AREA URNS HPF: ABNORMAL /HPF
BILIRUB UR QL STRIP: NEGATIVE
CLARITY UR: ABNORMAL
COLOR UR: YELLOW
CTP QC/QA: YES
GLUCOSE UR QL STRIP: NEGATIVE
HGB UR QL STRIP: ABNORMAL
HYALINE CASTS #/AREA URNS LPF: 0 /LPF
KETONES UR QL STRIP: NEGATIVE
LEUKOCYTE ESTERASE UR QL STRIP: ABNORMAL
MICROSCOPIC COMMENT: ABNORMAL
MOLECULAR STREP A: NEGATIVE
NITRITE UR QL STRIP: POSITIVE
NON-SQ EPI CELLS #/AREA URNS HPF: 1 /HPF
PH UR STRIP: 7 [PH] (ref 5–8)
POC MOLECULAR INFLUENZA A AGN: NEGATIVE
POC MOLECULAR INFLUENZA B AGN: NEGATIVE
PROT UR QL STRIP: ABNORMAL
RBC #/AREA URNS HPF: 22 /HPF (ref 0–4)
SARS-COV-2 RDRP RESP QL NAA+PROBE: NEGATIVE
SP GR UR STRIP: 1.01 (ref 1–1.03)
SQUAMOUS #/AREA URNS HPF: 13 /HPF
TRICHOMONAS UR QL MICRO: ABNORMAL
URN SPEC COLLECT METH UR: ABNORMAL
UROBILINOGEN UR STRIP-ACNC: ABNORMAL EU/DL
WBC #/AREA URNS HPF: >100 /HPF (ref 0–5)

## 2023-11-12 PROCEDURE — 87502 INFLUENZA DNA AMP PROBE: CPT

## 2023-11-12 PROCEDURE — 25000003 PHARM REV CODE 250: Performed by: PHYSICIAN ASSISTANT

## 2023-11-12 PROCEDURE — 99284 EMERGENCY DEPT VISIT MOD MDM: CPT

## 2023-11-12 PROCEDURE — 96372 THER/PROPH/DIAG INJ SC/IM: CPT | Performed by: EMERGENCY MEDICINE

## 2023-11-12 PROCEDURE — 87086 URINE CULTURE/COLONY COUNT: CPT | Performed by: EMERGENCY MEDICINE

## 2023-11-12 PROCEDURE — 87635 SARS-COV-2 COVID-19 AMP PRB: CPT | Performed by: EMERGENCY MEDICINE

## 2023-11-12 PROCEDURE — 87186 SC STD MICRODIL/AGAR DIL: CPT | Performed by: EMERGENCY MEDICINE

## 2023-11-12 PROCEDURE — 63600175 PHARM REV CODE 636 W HCPCS: Performed by: EMERGENCY MEDICINE

## 2023-11-12 PROCEDURE — 25000003 PHARM REV CODE 250: Performed by: EMERGENCY MEDICINE

## 2023-11-12 PROCEDURE — 81000 URINALYSIS NONAUTO W/SCOPE: CPT | Performed by: EMERGENCY MEDICINE

## 2023-11-12 PROCEDURE — 87077 CULTURE AEROBIC IDENTIFY: CPT | Performed by: EMERGENCY MEDICINE

## 2023-11-12 PROCEDURE — 87880 STREP A ASSAY W/OPTIC: CPT

## 2023-11-12 PROCEDURE — 87088 URINE BACTERIA CULTURE: CPT | Performed by: EMERGENCY MEDICINE

## 2023-11-12 RX ORDER — VITAMIN A 3000 MCG
1 CAPSULE ORAL
Qty: 30 ML | Refills: 0 | Status: SHIPPED | OUTPATIENT
Start: 2023-11-12

## 2023-11-12 RX ORDER — LEVOCETIRIZINE DIHYDROCHLORIDE 5 MG/1
5 TABLET, FILM COATED ORAL NIGHTLY
Qty: 30 TABLET | Refills: 0 | Status: SHIPPED | OUTPATIENT
Start: 2023-11-12 | End: 2024-11-11

## 2023-11-12 RX ORDER — AMOXICILLIN AND CLAVULANATE POTASSIUM 875; 125 MG/1; MG/1
1 TABLET, FILM COATED ORAL 2 TIMES DAILY
Qty: 20 TABLET | Refills: 0 | Status: SHIPPED | OUTPATIENT
Start: 2023-11-12 | End: 2023-11-22

## 2023-11-12 RX ORDER — ACETAMINOPHEN 500 MG
500 TABLET ORAL EVERY 6 HOURS PRN
Qty: 30 TABLET | Refills: 0 | Status: SHIPPED | OUTPATIENT
Start: 2023-11-12

## 2023-11-12 RX ORDER — IBUPROFEN 600 MG/1
600 TABLET ORAL EVERY 6 HOURS PRN
Qty: 20 TABLET | Refills: 0 | Status: SHIPPED | OUTPATIENT
Start: 2023-11-12

## 2023-11-12 RX ORDER — FLUTICASONE PROPIONATE 50 MCG
1 SPRAY, SUSPENSION (ML) NASAL 2 TIMES DAILY
Qty: 16 G | Refills: 0 | Status: SHIPPED | OUTPATIENT
Start: 2023-11-12

## 2023-11-12 RX ORDER — ACETAMINOPHEN 500 MG
1000 TABLET ORAL
Status: COMPLETED | OUTPATIENT
Start: 2023-11-12 | End: 2023-11-12

## 2023-11-12 RX ORDER — BENZONATATE 200 MG/1
200 CAPSULE ORAL 3 TIMES DAILY PRN
Qty: 30 CAPSULE | Refills: 0 | Status: SHIPPED | OUTPATIENT
Start: 2023-11-12 | End: 2023-11-22

## 2023-11-12 RX ADMIN — CEFTRIAXONE 1 G: 1 INJECTION, POWDER, FOR SOLUTION INTRAMUSCULAR; INTRAVENOUS at 11:11

## 2023-11-12 RX ADMIN — ACETAMINOPHEN 1000 MG: 500 TABLET ORAL at 10:11

## 2023-11-12 NOTE — ED PROVIDER NOTES
"Encounter Date: 11/12/2023    SCRIBE #1 NOTE: I, Eric Bingham, am scribing for, and in the presence of,  Roselia Cedeño DO. I have scribed the following portions of the note - Other sections scribed: HPI, ROS, PE, MDM.       History     Chief Complaint   Patient presents with    Headache     Pt to ER with reports of headache and chills since yesterday. Pt seen at  and diagnosed with a sinus infection. Pt reports " I wasn't given any abx". Pt flu/covid negative yesterday      Chris De La Paz is a 57 y.o. female, with a PMHx of HTN, CVA (2017), who presents to the ED for chief complaint of headaches onset yesterday. Patient reports associated chills, myalgias, frequency, rhinorrhea. Patient states she went to  yesterday and was told she had a sinus infection, but she feels worse today. No other exacerbating or alleviating factors. Denies nausea, dysuria, ear pain, sinus pain, cough or other associated symptoms. Patient endorses tobacco (3 a week), alcohol (3 drinks on weekends), and marijuana use (occasionally).    The history is provided by the patient. No  was used.     Review of patient's allergies indicates:   Allergen Reactions    Ace inhibitors Other (See Comments)     Angioedema       Past Medical History:   Diagnosis Date    Hypertension     Stroke      Past Surgical History:   Procedure Laterality Date    TUBAL LIGATION       Family History   Problem Relation Age of Onset    Hypertension Mother     Hypotension Father      Social History     Tobacco Use    Smoking status: Every Day     Current packs/day: 0.50     Types: Cigarettes    Smokeless tobacco: Never   Substance Use Topics    Alcohol use: Yes     Comment: socially    Drug use: Yes     Types: Marijuana     Comment: occ     Review of Systems   Constitutional:  Positive for chills. Negative for fever.   HENT:  Positive for rhinorrhea. Negative for ear pain, sinus pressure, sinus pain and sore throat.    Eyes:  Negative for " redness.   Respiratory:  Negative for cough and shortness of breath.    Cardiovascular:  Negative for chest pain and leg swelling.   Gastrointestinal:  Negative for abdominal pain, diarrhea, nausea and vomiting.   Genitourinary:  Positive for frequency. Negative for dysuria.   Musculoskeletal:  Positive for myalgias. Negative for back pain.   Skin:  Negative for rash.   Neurological:  Positive for headaches. Negative for syncope.   All other systems reviewed and are negative.      Physical Exam     Initial Vitals [11/12/23 1009]   BP Pulse Resp Temp SpO2   134/70 (!) 116 18 99.3 °F (37.4 °C) 99 %      MAP       --           Patient gave consent to have physical exam performed.      Physical Exam    Nursing note and vitals reviewed.  Constitutional: She appears well-developed and well-nourished.   HENT:   Head: Normocephalic and atraumatic.   Right Ear: External ear normal.   Left Ear: External ear normal.   Nose: Mucosal edema and rhinorrhea present.   Eyes: Conjunctivae and EOM are normal. Pupils are equal, round, and reactive to light. Right eye exhibits no discharge. Left eye exhibits no discharge.   Neck: Neck supple.   Normal range of motion.  Cardiovascular:  Normal rate, regular rhythm, normal heart sounds and intact distal pulses.     Exam reveals no gallop and no friction rub.       No murmur heard.  Pulmonary/Chest: Breath sounds normal. No respiratory distress. She has no wheezes. She has no rhonchi. She has no rales. She exhibits no tenderness.   Abdominal: Abdomen is soft. Bowel sounds are normal. She exhibits no distension and no mass. There is no abdominal tenderness.   No CVA tenderness There is no rebound and no guarding.   Musculoskeletal:         General: No tenderness or edema. Normal range of motion.      Cervical back: Normal range of motion and neck supple.     Neurological: She is alert and oriented to person, place, and time. She has normal strength and normal reflexes. She displays normal  "reflexes. No cranial nerve deficit or sensory deficit.   Skin: Skin is warm and dry. No rash and no abscess noted. No erythema. No pallor.   Psychiatric: She has a normal mood and affect.         ED Course   Procedures  Labs Reviewed   URINALYSIS, REFLEX TO URINE CULTURE - Abnormal; Notable for the following components:       Result Value    Appearance, UA Hazy (*)     Protein, UA 1+ (*)     Occult Blood UA Trace (*)     Nitrite, UA Positive (*)     Urobilinogen, UA 2.0-3.0 (*)     Leukocytes, UA 3+ (*)     All other components within normal limits    Narrative:     Specimen Source->Urine   URINALYSIS MICROSCOPIC - Abnormal; Notable for the following components:    RBC, UA 22 (*)     WBC, UA >100 (*)     Non-Squam Epith 1 (*)     Trichomonas, UA Occasional (*)     All other components within normal limits    Narrative:     Specimen Source->Urine   CULTURE, URINE   POCT INFLUENZA A/B MOLECULAR   SARS-COV-2 RDRP GENE   POCT STREP A MOLECULAR          Imaging Results    None          Medications   acetaminophen tablet 1,000 mg (1,000 mg Oral Given 11/12/23 1035)   cefTRIAXone (ROCEPHIN) 1 g in LIDOcaine HCL 10 mg/ml (1%) 4 mL IM only syringe (1 g Intramuscular Given 11/12/23 1149)     Medical Decision Making  Amount and/or Complexity of Data Reviewed  Labs: ordered.    Risk  OTC drugs.  Prescription drug management.        Medical Decision Making:    This is an evaluation of a 57 y.o. female that presents to the Emergency Department for   Chief Complaint   Patient presents with    Headache     Pt to ER with reports of headache and chills since yesterday. Pt seen at  and diagnosed with a sinus infection. Pt reports " I wasn't given any abx". Pt flu/covid negative yesterday      The patient is a non-toxic and well appearing patient. On physical exam, patient appears well hydrated with moist mucus membranes. Breath sounds are clear and equal bilaterally with no adventitious breath sounds, tachypnea or respiratory " distress. Regular rate and rhythm. No murmurs. Abdomen soft and non tender. Patient is tolerating PO without difficulty. Physical exam otherwise as above.     I have reviewed vital signs and nursing notes.   Vital Signs Are Reassuring.     Based on the patient's symptoms, I am considering and evaluating for the following differential diagnoses: UTI, viral illness, flu, COVID    ED Course:Treatment in the ED included Physical Exam and medications given in ED  Medications   acetaminophen tablet 1,000 mg (1,000 mg Oral Given 11/12/23 1035)   cefTRIAXone (ROCEPHIN) 1 g in LIDOcaine HCL 10 mg/ml (1%) 4 mL IM only syringe (1 g Intramuscular Given 11/12/23 1149)   .   Patient reports feeling better after treatment in the ER.     External Data/Documents Reviewed: Previous medical records and vital signs reviewed, see HPI and Physical exam.   Labs: ordered and reviewed.  UA nitrite positive and leukocyte positive.   Risk  Diagnosis or treatment significantly limited by the following social determinants of health: Body mass index is 22.71 kg/m².     In shared decision making with the patient, we discussed treatment, prescriptions, labs, and imaging results.    Discharge home with   ED Prescriptions       Medication Sig Dispense Start Date End Date Auth. Provider    fluticasone propionate (FLONASE) 50 mcg/actuation nasal spray 1 spray (50 mcg total) by Each Nostril route 2 (two) times daily. 16 g 11/12/2023 -- Roselia Cedeño,     acetaminophen (TYLENOL) 500 MG tablet Take 1 tablet (500 mg total) by mouth every 6 (six) hours as needed for Pain (and fever). 30 tablet 11/12/2023 -- Roselia Cedeño DO    ibuprofen (ADVIL,MOTRIN) 600 MG tablet Take 1 tablet (600 mg total) by mouth every 6 (six) hours as needed for Pain (Take with food as needed for mild-to-moderate pain). 20 tablet 11/12/2023 -- Roselia Cedeño DO    amoxicillin-clavulanate 875-125mg (AUGMENTIN) 875-125 mg per tablet Take 1 tablet by mouth 2 (two) times daily. for 10  days 20 tablet 11/12/2023 11/22/2023 Roselia Cedeño,     sodium chloride (SALINE NASAL) 0.65 % nasal spray 1 spray by Nasal route as needed for Congestion. 30 mL 11/12/2023 -- Roselia Cedeño DO    levocetirizine (XYZAL) 5 MG tablet Take 1 tablet (5 mg total) by mouth every evening. 30 tablet 11/12/2023 11/11/2024 Roselia Cedeño,     fluticasone propionate (FLONASE) 50 mcg/actuation nasal spray 1 spray (50 mcg total) by Each Nostril route 2 (two) times daily. 16 g 11/12/2023 -- Roselia Cedeño DO    benzonatate (TESSALON) 200 MG capsule Take 1 capsule (200 mg total) by mouth 3 (three) times daily as needed for Cough. 30 capsule 11/12/2023 11/22/2023 Roselia Cedeño DO          Fill and take prescriptions as directed.  Return to the ED if symptoms worsen or do not resolve.   Answered questions and discussed discharge plan.    Patient feels better and is ready for discharge.  Follow up with PCP/specialist in 1 day    The following labs and imaging were reviewed:    Admission on 11/12/2023, Discharged on 11/12/2023   Component Date Value Ref Range Status    POC Molecular Influenza A Ag 11/12/2023 Negative  Negative, Not Reported Final    POC Molecular Influenza B Ag 11/12/2023 Negative  Negative, Not Reported Final     Acceptable 11/12/2023 Yes   Final    POC Rapid COVID 11/12/2023 Negative  Negative Final     Acceptable 11/12/2023 Yes   Final    Molecular Strep A, POC 11/12/2023 Negative  Negative Final     Acceptable 11/12/2023 Yes   Final    Specimen UA 11/12/2023 Urine, Unspecified   Final    Color, UA 11/12/2023 Yellow  Yellow, Straw, Diamond Final    Appearance, UA 11/12/2023 Hazy (A)  Clear Final    pH, UA 11/12/2023 7.0  5.0 - 8.0 Final    Specific Gravity, UA 11/12/2023 1.015  1.005 - 1.030 Final    Protein, UA 11/12/2023 1+ (A)  Negative Final    Comment: Recommend a 24 hour urine protein or a urine   protein/creatinine ratio if globulin induced proteinuria is  clinically  suspected.      Glucose, UA 11/12/2023 Negative  Negative Final    Ketones, UA 11/12/2023 Negative  Negative Final    Bilirubin (UA) 11/12/2023 Negative  Negative Final    Occult Blood UA 11/12/2023 Trace (A)  Negative Final    Nitrite, UA 11/12/2023 Positive (A)  Negative Final    Urobilinogen, UA 11/12/2023 2.0-3.0 (A)  <2.0 EU/dL Final    Leukocytes, UA 11/12/2023 3+ (A)  Negative Final    RBC, UA 11/12/2023 22 (H)  0 - 4 /hpf Final    WBC, UA 11/12/2023 >100 (H)  0 - 5 /hpf Final    Bacteria 11/12/2023 Occasional  None-Occ /hpf Final    Squam Epithel, UA 11/12/2023 13  /hpf Final    Non-Squam Epith 11/12/2023 1 (A)  <1/hpf /hpf Final    Hyaline Casts, UA 11/12/2023 0  0-1/lpf /lpf Final    Trichomonas, UA 11/12/2023 Occasional (A)  None Final    Microscopic Comment 11/12/2023 SEE COMMENT   Final    Comment: Other formed elements not mentioned in the report are not   present in the microscopic examination.           Imaging Results    None                Scribe Attestation:   Scribe #1: I performed the above scribed service and the documentation accurately describes the services I performed. I attest to the accuracy of the note.                        I, Dr Roselia Cedeño, personally performed the services described in this documentation. All medical record entries made by the scribe were at my direction and in my presence. I have reviewed the chart and agree that the record reflects my personal performance and is accurate and complete.     Clinical Impression:   Final diagnoses:  [N39.0] Acute urinary tract infection (Primary)  [J06.9] Viral upper respiratory illness        ED Disposition Condition    Discharge Stable          ED Prescriptions       Medication Sig Dispense Start Date End Date Auth. Provider    fluticasone propionate (FLONASE) 50 mcg/actuation nasal spray 1 spray (50 mcg total) by Each Nostril route 2 (two) times daily. 16 g 11/12/2023 -- Roselia Cedeño DO    acetaminophen (TYLENOL) 500 MG  tablet Take 1 tablet (500 mg total) by mouth every 6 (six) hours as needed for Pain (and fever). 30 tablet 11/12/2023 -- Roselia Cedeño DO    ibuprofen (ADVIL,MOTRIN) 600 MG tablet Take 1 tablet (600 mg total) by mouth every 6 (six) hours as needed for Pain (Take with food as needed for mild-to-moderate pain). 20 tablet 11/12/2023 -- Roselia Cedeño DO    amoxicillin-clavulanate 875-125mg (AUGMENTIN) 875-125 mg per tablet Take 1 tablet by mouth 2 (two) times daily. for 10 days 20 tablet 11/12/2023 11/22/2023 Roselia Cedeño DO    sodium chloride (SALINE NASAL) 0.65 % nasal spray 1 spray by Nasal route as needed for Congestion. 30 mL 11/12/2023 -- Roselia Cedeño DO    levocetirizine (XYZAL) 5 MG tablet Take 1 tablet (5 mg total) by mouth every evening. 30 tablet 11/12/2023 11/11/2024 Roselia Cedeño DO    fluticasone propionate (FLONASE) 50 mcg/actuation nasal spray 1 spray (50 mcg total) by Each Nostril route 2 (two) times daily. 16 g 11/12/2023 -- Roselia Cedeño DO    benzonatate (TESSALON) 200 MG capsule Take 1 capsule (200 mg total) by mouth 3 (three) times daily as needed for Cough. 30 capsule 11/12/2023 11/22/2023 Roselia Cedeño DO          Follow-up Information       Follow up With Specialties Details Why Contact Info Additional Information    [facility] -  Schedule an appointment as soon as possible for a visit in 1 day Please establish a primary care physician [address] [facility]  [place] [address] [phone]       Ozzy Hart MD Internal Medicine Schedule an appointment as soon as possible for a visit in 1 day Please establish a primary care physician [address]  [place] [address] [phone]       [facility] Family Medicine Schedule an appointment as soon as possible for a visit in 1 day Please establish a primary care physician [address], [address]  [facility] [address] [phone] Please park in [address] or D and use Debora varma. [facility]  Office Bldg. elevators.    [facility] - Emergency Dept Emergency Medicine Go to  Please go to [facility] emergency department if symptoms worsen [address]  [facility] [place] [address]  [phone]              Roselia Cedeño DO  11/12/23 2628

## 2023-11-12 NOTE — ED TRIAGE NOTES
Chris De La Paz, a 57 y.o. female presents to the ED w/ complaint of generalized body aches and chills x yesterday. Pt reports she was seen at urgent care and was told she has a sinus infection. Pt states she feels worse today than yesterday. Pt is AAOX4 and NADN.

## 2023-11-12 NOTE — Clinical Note
"Chris Kongs" Ana Cristina was seen and treated in our emergency department on 11/12/2023.  She may return to work on 11/15/2023.       If you have any questions or concerns, please don't hesitate to call.      Roselia Cedeño, DO"

## 2023-11-14 LAB — BACTERIA UR CULT: ABNORMAL

## 2023-12-09 ENCOUNTER — HOSPITAL ENCOUNTER (EMERGENCY)
Facility: HOSPITAL | Age: 58
Discharge: HOME | End: 2023-12-09
Attending: EMERGENCY MEDICINE
Payer: MEDICAID

## 2023-12-09 VITALS
TEMPERATURE: 98 F | RESPIRATION RATE: 18 BRPM | WEIGHT: 145 LBS | HEART RATE: 81 BPM | BODY MASS INDEX: 22.71 KG/M2 | SYSTOLIC BLOOD PRESSURE: 136 MMHG | DIASTOLIC BLOOD PRESSURE: 76 MMHG | OXYGEN SATURATION: 100 %

## 2023-12-09 DIAGNOSIS — Z87.39 HISTORY OF CHRONIC BACK PAIN: ICD-10-CM

## 2023-12-09 DIAGNOSIS — M54.9 ACUTE BILATERAL BACK PAIN, UNSPECIFIED BACK LOCATION: Primary | ICD-10-CM

## 2023-12-09 PROCEDURE — 96372 THER/PROPH/DIAG INJ SC/IM: CPT | Performed by: NURSE PRACTITIONER

## 2023-12-09 PROCEDURE — 99284 EMERGENCY DEPT VISIT MOD MDM: CPT

## 2023-12-09 PROCEDURE — 63600175 PHARM REV CODE 636 W HCPCS: Performed by: NURSE PRACTITIONER

## 2023-12-09 PROCEDURE — 25000003 PHARM REV CODE 250: Performed by: NURSE PRACTITIONER

## 2023-12-09 RX ORDER — CYCLOBENZAPRINE HCL 10 MG
10 TABLET ORAL EVERY 8 HOURS PRN
Qty: 10 TABLET | Refills: 0 | Status: SHIPPED | OUTPATIENT
Start: 2023-12-09 | End: 2023-12-19

## 2023-12-09 RX ORDER — DEXAMETHASONE SODIUM PHOSPHATE 4 MG/ML
8 INJECTION, SOLUTION INTRA-ARTICULAR; INTRALESIONAL; INTRAMUSCULAR; INTRAVENOUS; SOFT TISSUE
Status: COMPLETED | OUTPATIENT
Start: 2023-12-09 | End: 2023-12-09

## 2023-12-09 RX ORDER — CYCLOBENZAPRINE HCL 10 MG
10 TABLET ORAL
Status: COMPLETED | OUTPATIENT
Start: 2023-12-09 | End: 2023-12-09

## 2023-12-09 RX ADMIN — CYCLOBENZAPRINE 10 MG: 10 TABLET, FILM COATED ORAL at 10:12

## 2023-12-09 RX ADMIN — DEXAMETHASONE SODIUM PHOSPHATE 8 MG: 4 INJECTION INTRA-ARTICULAR; INTRALESIONAL; INTRAMUSCULAR; INTRAVENOUS; SOFT TISSUE at 10:12

## 2023-12-09 NOTE — DISCHARGE INSTRUCTIONS
§ Please return to the Emergency Department for any new or worsening symptoms including: fever, chest pain, shortness of breath, loss of consciousness, dizziness, weakness, or any other concerns.     § Schedule an appointment for follow up with your Primary Care Doctor as soon as possible for a recheck of your symptoms. If you do not have one, contact the one listed on your discharge paperwork or call the [facility] Appointment Desk at [phone] to schedule an appointment.     § Please take all medication as prescribed. You have been prescribed Flexeril (Cyclobenzaprine) for muscle spasms/pain. Please do not take this medication while working, drinking alcohol, swimming, or while driving/operating heavy machinery. This medication may cause drowsiness, dizziness, impair judgment, and reduce physical capabilities.You should not drive, operate heavy machinery, or make life changing decisions while taking this medication.

## 2023-12-09 NOTE — ED PROVIDER NOTES
Encounter Date: 12/9/2023       History     Chief Complaint   Patient presents with    Back Pain     Pt c/o mid back pain x 3 days. Pt denies falls, injury, lifting heavy.      CC: Back Pain    HPI: Chris De La Paz, a 57 y.o. female presents to the ED with a 3 day history bilateral back pain.  She reports no falls or injuries.  She reports pain is worse with twisting and turning and movement.  She does report a recurring history of this similar back pain with similar location and nature as today's back pain that been ongoing for some time.  Most recent episode was about 3-4 months ago.  She reports no urinary symptoms, bowel or bladder incontinence or retention, lower extremity swelling or edema, chest pain, shortness breath, fever.  Attempted treatment Tylenol PM last night without improvement.  States that when she gets these back pain flares she typically gets a steroid shot and Flexeril which to help.  She does believe this pain is an exacerbation of her chronic similar back pain.    Patient Active Problem List:     Expressive aphasia     Essential hypertension     Microcytic anemia     Tobacco abuse     History of CVA with residual deficit     Dysarthria     Cerebrovascular accident      The history is provided by the patient. No  was used.     Review of patient's allergies indicates:   Allergen Reactions    Ace inhibitors Other (See Comments)     Angioedema       Past Medical History:   Diagnosis Date    Hypertension     Stroke      Past Surgical History:   Procedure Laterality Date    TUBAL LIGATION       Family History   Problem Relation Age of Onset    Hypertension Mother     Hypotension Father      Social History     Tobacco Use    Smoking status: Every Day     Current packs/day: 0.50     Types: Cigarettes    Smokeless tobacco: Never   Substance Use Topics    Alcohol use: Yes     Comment: socially    Drug use: Yes     Types: Marijuana     Comment: occ     Review of Systems    Constitutional:  Negative for fever.   HENT:  Negative for sore throat.    Respiratory:  Negative for cough, chest tightness and shortness of breath.    Cardiovascular:  Negative for chest pain, palpitations and leg swelling.   Gastrointestinal:  Negative for abdominal pain, constipation, diarrhea, nausea and vomiting.   Genitourinary:  Negative for decreased urine volume, difficulty urinating and dysuria.        (-) Bowel or Bladder Incontinence or Retention   Musculoskeletal:  Positive for back pain. Negative for gait problem, joint swelling, neck pain and neck stiffness.   Skin:  Negative for color change, rash and wound.   Neurological:  Negative for dizziness, seizures, syncope, speech difficulty, weakness and headaches.   Psychiatric/Behavioral:  Negative for confusion.        Physical Exam     Initial Vitals [12/09/23 0947]   BP Pulse Resp Temp SpO2   136/76 81 18 97.7 °F (36.5 °C) 100 %      MAP       --         Physical Exam    Nursing note and vitals reviewed.  Constitutional: She appears well-developed and well-nourished. She is not diaphoretic. She is cooperative.  Non-toxic appearance. She does not have a sickly appearance. She does not appear ill. No distress.   HENT:   Head: Normocephalic and atraumatic.   Right Ear: External ear normal.   Left Ear: External ear normal.   Nose: Nose normal.   Mouth/Throat: Mucous membranes are normal. No trismus in the jaw.   Eyes: Conjunctivae and EOM are normal.   Neck: Phonation normal. No tracheal deviation present.   Normal range of motion.  Cardiovascular:  Normal rate, regular rhythm and intact distal pulses.           Pulses:       Radial pulses are 2+ on the right side and 2+ on the left side.        Posterior tibial pulses are 2+ on the right side and 2+ on the left side.   Lower extremity symmetric in size.  No swelling or edema.  No calf tenderness.   Pulmonary/Chest: Effort normal and breath sounds normal. No accessory muscle usage or stridor. No  tachypnea and no bradypnea. No respiratory distress. She has no wheezes. She has no rhonchi. She has no rales. She exhibits no tenderness.   Abdominal: She exhibits no distension. There is no abdominal tenderness. There is no rebound and no guarding.   Musculoskeletal:         General: Normal range of motion.      Cervical back: Normal range of motion.        Back:      Neurological: She is alert and oriented to person, place, and time. She has normal strength. No sensory deficit. Coordination and gait normal. GCS score is 15. GCS eye subscore is 4. GCS verbal subscore is 5. GCS motor subscore is 6.   Skin: Skin is warm, dry and intact. Capillary refill takes less than 2 seconds. No bruising and no rash noted. No cyanosis or erythema. Nails show no clubbing.   Psychiatric: She has a normal mood and affect. Her behavior is normal. Judgment and thought content normal.         ED Course   Procedures  Labs Reviewed - No data to display       Imaging Results    None          Medications   dexAMETHasone injection 8 mg (8 mg Intramuscular Given 12/9/23 1022)   cyclobenzaprine tablet 10 mg (10 mg Oral Given 12/9/23 1022)     Medical Decision Making  Risk  Prescription drug management.         APC / Resident Notes:   This is an evaluation of a 57 y.o. female that presents to the Emergency Department for back pain consistent with her typical back pain flares.  Denies fever, rash, night sweats, weight loss, dysuria, bowel/bladder incontinence, immunosuppression. The patient is a non-toxic, afebrile, and well appearing female. On physical exam, there is no tenderness to the back. There is no abdominal tenderness to palpation, CVA tenderness, saddle anesthesia, rash, erythema, or open wounds. Strength and sensation are symmetric bilaterally. Upper and lower extremity  pulses are symmetrical.  No midline step-off or crepitus of the C, T, or L-spine. Vital signs reassuring.     Given the above findings, my overall impression is  back pain, suspect musculoskeletal and recurrence of chronic back pain. Given the above findings, I do not think the patient has acute vertebral fracture, subluxation, dislocation, sciatica, epidural abscess, cauda equina, shingles, UTI, pyelonephritis, PE/Disection.    D/C Meds:  Decadron in the ED, Flexeril. D/C Information:  Back pain discharge instructions return precautions. The diagnosis, treatment plan, instructions for follow-up and reevaluation with PCP as well as ED return precautions were discussed and understanding was verbalized. All questions or concerns have been addressed.  BLAIR Luong, FNP-C                   Medical Decision Making:   History:   Old Medical Records: I decided to obtain old medical records.  Old Records Summarized: records from previous admission(s).       <> Summary of Records: ED visit 11/12/2023 with diagnosis of URI and UTI.  Discharged with Augmentin.  Urine culture sensitive to Augmentin.  05/08/2023 with diagnosis of UTI and right-sided lower back pain.             Clinical Impression:  Final diagnoses:  [M54.9] Acute bilateral back pain, unspecified back location (Primary)  [Z87.39] History of chronic back pain          ED Disposition Condition    Discharge Stable          ED Prescriptions       Medication Sig Dispense Start Date End Date Auth. Provider    cyclobenzaprine (FLEXERIL) 10 MG tablet Take 1 tablet (10 mg total) by mouth every 8 (eight) hours as needed for Muscle spasms. 10 tablet 12/9/2023 12/19/2023 Kerwin Bonilla, RYAP          Follow-up Information       Follow up With Specialties Details Why Contact Info    Your Primary Care Doctor  Schedule an appointment as soon as possible for a visit  Please call and schedule an appointment for follow up this week.     [facility] -  Schedule an appointment as soon as possible for a visit  For Follow-Up, This Week, If you do not have a Primary Care Doctor [address] [place]  [address]  [phone]      [facility] - Emergency Dept Emergency Medicine Go to  If symptoms worsen [address] [facility] [place] [id number] [phone]             Kerwin Bonilla, FNP  12/09/23 1117

## 2023-12-09 NOTE — ED NOTES
Pt reports mid area back pain for the past 3 days. Pt. Reports pain is present with positional changes and does not radiate. Pt. Reports in the past she was given steroids, which aids with the pain. Pt. Reports taking tylenol PM for the pain however it did not help.

## 2024-01-23 ENCOUNTER — HOSPITAL ENCOUNTER (EMERGENCY)
Facility: HOSPITAL | Age: 59
Discharge: HOME | End: 2024-01-23
Attending: EMERGENCY MEDICINE
Payer: COMMERCIAL

## 2024-01-23 VITALS
SYSTOLIC BLOOD PRESSURE: 137 MMHG | OXYGEN SATURATION: 98 % | HEIGHT: 67 IN | TEMPERATURE: 98 F | WEIGHT: 150 LBS | RESPIRATION RATE: 18 BRPM | DIASTOLIC BLOOD PRESSURE: 75 MMHG | HEART RATE: 100 BPM | BODY MASS INDEX: 23.54 KG/M2

## 2024-01-23 DIAGNOSIS — N30.01 ACUTE CYSTITIS WITH HEMATURIA: Primary | ICD-10-CM

## 2024-01-23 DIAGNOSIS — M54.50 LUMBAR BACK PAIN: ICD-10-CM

## 2024-01-23 DIAGNOSIS — N28.1 CYST OF LEFT KIDNEY: ICD-10-CM

## 2024-01-23 LAB
BACTERIA #/AREA URNS HPF: ABNORMAL /HPF
BILIRUB UR QL STRIP: NEGATIVE
CLARITY UR: CLEAR
COLOR UR: YELLOW
GLUCOSE UR QL STRIP: NEGATIVE
HGB UR QL STRIP: ABNORMAL
KETONES UR QL STRIP: NEGATIVE
LEUKOCYTE ESTERASE UR QL STRIP: ABNORMAL
MICROSCOPIC COMMENT: ABNORMAL
NITRITE UR QL STRIP: NEGATIVE
PH UR STRIP: 6 [PH] (ref 5–8)
PROT UR QL STRIP: NEGATIVE
RBC #/AREA URNS HPF: 14 /HPF (ref 0–4)
SP GR UR STRIP: 1.02 (ref 1–1.03)
SQUAMOUS #/AREA URNS HPF: 1 /HPF
URN SPEC COLLECT METH UR: ABNORMAL
UROBILINOGEN UR STRIP-ACNC: NEGATIVE EU/DL
WBC #/AREA URNS HPF: 13 /HPF (ref 0–5)

## 2024-01-23 PROCEDURE — 96372 THER/PROPH/DIAG INJ SC/IM: CPT

## 2024-01-23 PROCEDURE — 25000003 PHARM REV CODE 250

## 2024-01-23 PROCEDURE — 87088 URINE BACTERIA CULTURE: CPT

## 2024-01-23 PROCEDURE — 63600175 PHARM REV CODE 636 W HCPCS

## 2024-01-23 PROCEDURE — 81000 URINALYSIS NONAUTO W/SCOPE: CPT

## 2024-01-23 PROCEDURE — 87086 URINE CULTURE/COLONY COUNT: CPT

## 2024-01-23 PROCEDURE — 99285 EMERGENCY DEPT VISIT HI MDM: CPT | Mod: 25

## 2024-01-23 PROCEDURE — 87186 SC STD MICRODIL/AGAR DIL: CPT

## 2024-01-23 PROCEDURE — 87077 CULTURE AEROBIC IDENTIFY: CPT

## 2024-01-23 RX ORDER — ACETAMINOPHEN 500 MG
1000 TABLET ORAL
Status: COMPLETED | OUTPATIENT
Start: 2024-01-23 | End: 2024-01-23

## 2024-01-23 RX ORDER — DEXAMETHASONE SODIUM PHOSPHATE 4 MG/ML
4 INJECTION, SOLUTION INTRA-ARTICULAR; INTRALESIONAL; INTRAMUSCULAR; INTRAVENOUS; SOFT TISSUE
Status: DISCONTINUED | OUTPATIENT
Start: 2024-01-23 | End: 2024-01-23

## 2024-01-23 RX ORDER — LIDOCAINE 50 MG/G
1 PATCH TOPICAL
Status: DISCONTINUED | OUTPATIENT
Start: 2024-01-23 | End: 2024-01-23 | Stop reason: HOSPADM

## 2024-01-23 RX ORDER — KETOROLAC TROMETHAMINE 10 MG/1
10 TABLET, FILM COATED ORAL EVERY 6 HOURS
Qty: 12 TABLET | Refills: 0 | Status: SHIPPED | OUTPATIENT
Start: 2024-01-23 | End: 2024-01-26

## 2024-01-23 RX ORDER — METHOCARBAMOL 500 MG/1
500 TABLET, FILM COATED ORAL
Status: COMPLETED | OUTPATIENT
Start: 2024-01-23 | End: 2024-01-23

## 2024-01-23 RX ORDER — NITROFURANTOIN 25; 75 MG/1; MG/1
100 CAPSULE ORAL 2 TIMES DAILY
Qty: 10 CAPSULE | Refills: 0 | Status: SHIPPED | OUTPATIENT
Start: 2024-01-23 | End: 2024-01-28

## 2024-01-23 RX ORDER — ONDANSETRON 4 MG/1
4 TABLET, ORALLY DISINTEGRATING ORAL 2 TIMES DAILY
Qty: 6 TABLET | Refills: 0 | Status: SHIPPED | OUTPATIENT
Start: 2024-01-23 | End: 2024-01-26

## 2024-01-23 RX ORDER — DEXAMETHASONE SODIUM PHOSPHATE 4 MG/ML
4 INJECTION, SOLUTION INTRA-ARTICULAR; INTRALESIONAL; INTRAMUSCULAR; INTRAVENOUS; SOFT TISSUE
Status: COMPLETED | OUTPATIENT
Start: 2024-01-23 | End: 2024-01-23

## 2024-01-23 RX ORDER — METHOCARBAMOL 500 MG/1
500 TABLET, FILM COATED ORAL 4 TIMES DAILY
Qty: 40 TABLET | Refills: 0 | Status: SHIPPED | OUTPATIENT
Start: 2024-01-23 | End: 2024-02-02

## 2024-01-23 RX ADMIN — LIDOCAINE 1 PATCH: 50 PATCH CUTANEOUS at 07:01

## 2024-01-23 RX ADMIN — METHOCARBAMOL TABLETS 500 MG: 500 TABLET, COATED ORAL at 07:01

## 2024-01-23 RX ADMIN — DEXAMETHASONE SODIUM PHOSPHATE 4 MG: 4 INJECTION INTRA-ARTICULAR; INTRALESIONAL; INTRAMUSCULAR; INTRAVENOUS; SOFT TISSUE at 07:01

## 2024-01-23 RX ADMIN — ACETAMINOPHEN 1000 MG: 500 TABLET ORAL at 07:01

## 2024-01-23 NOTE — Clinical Note
"Chris Kongs" Ana Cristina was seen and treated in our emergency department on 1/23/2024.  She may return to work on 01/24/2024.       If you have any questions or concerns, please don't hesitate to call.      Niecy Mendoza PA-C"

## 2024-01-23 NOTE — ED TRIAGE NOTES
"Pt arrived with c/o lower back pain at midline x2 days, worsening this AM.  Pt denies any recent falls, trauma, or heavy lifting.  Pain is non-radiating, described as a "pulling."  Took flexeril and ibuprofen with no relief.  PT denies any numbness or tingling.  Ambulatory with no assistance needed.  Pt answering questions appropriately, speaking in complete sentences, respirations even and unlabored.  Aao x 4.    "

## 2024-01-24 NOTE — DISCHARGE INSTRUCTIONS
Thank you for letting me care for you today - it was nice to meet you and I hope you feel better soon. Please return to the ER if your symptoms don't improve or get worse. And be sure to follow up with your primary care provider within the next week for follow up care. Also, I have placed referrals to Physical Medicine and Rehabilitation for your back pain and to Urology for your kidney. You can call [phone] to schedule.     Please take your antibiotics as prescribed and finish the whole course of antibiotics even if your symptoms improve.     Our goal at [facility] is to always give you outstanding care and exceptional service. You may receive a survey by mail or email in the next week about your experience in our ED. We would greatly appreciate you completing and returning the survey. Your feedback provides us with a way to recognize our staff who give very good care and it helps us learn how to improve when your experience was below our aspiration of excellence.     All the best,     Niecy Mendoza, MPH, PA-C  Emergency Department Physician Assistant  [facility], [facility]

## 2024-01-25 NOTE — ED PROVIDER NOTES
Encounter Date: 1/23/2024       History     Chief Complaint   Patient presents with    Back Pain     Lower middle back pain for several day. Denies injury and heavy lifting. OTC medications have been ineffective at relieving symptoms.      Patient is a 58 y.o. female who presents for evaluation of thoracic back pain x 3 days. Initial inciting event: none. Patient does have a hx of back pain. Patient has taken OTC pain meds for symptom relief.  There is no numbness, weakness, incontinence, or retention reported. Denies hx of IVDU or malignancy. Denies fever, headache, chest pain, shortness of breath, wheezing, stridor, drooling, NVD, abdominal pain, constipation, dysuria, hematuria, joint problems, rashes, or any other complaints at this time.      The history is provided by the patient.     Review of patient's allergies indicates:   Allergen Reactions    Ace inhibitors Other (See Comments)     Angioedema       Past Medical History:   Diagnosis Date    Hypertension     Stroke 2017     Past Surgical History:   Procedure Laterality Date    TUBAL LIGATION       Family History   Problem Relation Age of Onset    Hypertension Mother     Hypotension Father      Social History     Tobacco Use    Smoking status: Every Day     Current packs/day: 0.50     Types: Cigarettes    Smokeless tobacco: Never   Substance Use Topics    Alcohol use: Yes     Comment: socially    Drug use: Yes     Types: Marijuana     Comment: occ     Review of Systems   Constitutional:  Negative for chills and fever.   HENT:  Negative for congestion and sore throat.    Respiratory:  Negative for shortness of breath and wheezing.    Cardiovascular:  Negative for chest pain.   Gastrointestinal:  Negative for abdominal distention, abdominal pain, diarrhea, nausea and vomiting.   Genitourinary:  Positive for flank pain. Negative for dysuria, frequency, hematuria and urgency.   Musculoskeletal:  Positive for back pain. Negative for neck pain and neck stiffness.    Skin:  Negative for rash.   Neurological:  Negative for weakness.   Hematological:  Does not bruise/bleed easily.   All other systems reviewed and are negative.      Physical Exam     Initial Vitals [01/23/24 1730]   BP Pulse Resp Temp SpO2   137/75 100 18 98.2 °F (36.8 °C) 98 %      MAP       --         Physical Exam    Constitutional: She appears well-developed and well-nourished.   HENT:   Head: Normocephalic and atraumatic.   Eyes: EOM are normal.   Neck:   Normal range of motion.  Cardiovascular:  Normal rate and regular rhythm.           Pulmonary/Chest: Breath sounds normal.   Abdominal: Abdomen is soft. Bowel sounds are normal. She exhibits no distension. There is no abdominal tenderness.   There is right CVA tenderness.  No left CVA tenderness. There is no rebound and no guarding.   Musculoskeletal:      Cervical back: Normal range of motion.        Back:       Comments: No midline tenderness, bony step-offs, deformities noted to C, T, L-spine.  No overlying skin changes or rashes.  Paraspinal tenderness in thoracic spine and over right flank.     Neurological: She is alert and oriented to person, place, and time.         ED Course   Procedures  Labs Reviewed   URINALYSIS, REFLEX TO URINE CULTURE - Abnormal; Notable for the following components:       Result Value    Occult Blood UA Trace (*)     Leukocytes, UA 1+ (*)     All other components within normal limits    Narrative:     Specimen Source->Urine   URINALYSIS MICROSCOPIC - Abnormal; Notable for the following components:    RBC, UA 14 (*)     WBC, UA 13 (*)     All other components within normal limits    Narrative:     Specimen Source->Urine   CULTURE, URINE          Imaging Results              CT Renal Stone Study ABD Pelvis WO (Final result)  Result time 01/23/24 19:15:00      Final result by Chucky Kumar MD (01/23/24 19:15:00)                   Impression:      1. No acute abnormality is identified.  2. Probable 9 mm lipoma in the head of  the pancreas.  3. Small 7 mm hyperdense focus at the lower pole the right kidney is indeterminate, possibly a nonobstructing stone, complex cyst or small hyperdense mass.  Limited characterization on this noncontrast study.  Recommend surveillance.  See above comments also.  4. Suspected uterine fibroids.  5. Possible constipation.      Electronically signed by: Chucky Christensen  Date:    01/23/2024  Time:    19:15               Narrative:    EXAMINATION:  CT RENAL STONE STUDY ABD PELVIS WO    CLINICAL HISTORY:  Flank pain, kidney stone suspected;  location not otherwise specified.    TECHNIQUE:  Low dose axial images, sagittal and coronal reformations were obtained from the lung bases to the pubic symphysis.  Contrast was not administered.    COMPARISON:  None    FINDINGS:  Heart: Normal in size. No pericardial effusion.    Lung Bases: Well aerated, without consolidation or pleural fluid.    Liver: Normal in size and attenuation, with no focal hepatic lesions.    Gallbladder: No calcified gallstones.    Bile Ducts: No evidence of dilated ducts.    Pancreas: 9 mm fat density lesion in the pancreatic head possibly a small lipoma.  This is seen on axial 70 of series 2.    Spleen: Unremarkable.    Adrenals: Unremarkable.    Kidneys/ Ureters: 7 mm hyperdense focus at the lower pole of the right kidney is indeterminate, possibly a stone, complex cyst or small hyperdense mass.  Limited characterization.  Recommend surveillance.    5.5 cm probable simple cyst in the left kidney at the superolateral margin with probable smaller adjacent cyst at the lateral margin with minimal wall calcification on axial 63 of series 2.  Limited characterization.    No hydronephrosis or hydroureter bilaterally.    Bladder: No evidence of wall thickening.  Urinary bladder is nondistended with limited visualization.    Reproductive organs: Slight prominence of the uterus with probable uterine fibroids.    GI Tract/Mesentery: No evidence of bowel  obstruction or inflammation.    Moderate retained feces in the colon.    Peritoneal Space: No ascites. No free air.    Retroperitoneum: No significant adenopathy.    Abdominal wall: Unremarkable.    Vasculature: No significant atherosclerosis or aneurysm.    Bones: No acute fracture.    The appendix is within normal limits.  Limited visualization.                                       Medications   methocarbamoL tablet 500 mg (500 mg Oral Given 1/23/24 1912)   acetaminophen tablet 1,000 mg (1,000 mg Oral Given 1/23/24 1912)   dexAMETHasone injection 4 mg (4 mg Intramuscular Given 1/23/24 1948)     Medical Decision Making  Patient is a afebrile, well appearing 58 y.o. female who presents for evaluation of mid back pain.  On exam, pain appears to be localized over right flank.  Patient is able to ambulate. Denies saddle anesthesia, loss of bowel or bladder control, or urinary retention.  No dysuria, hematuria Pulses normal. BLE strength normal. Neurovascularly intact.     Differential Diagnosis includes, but is not limited to: Cauda equina syndrome, disc herniation, spinal stenosis, sciatica, radiculopathy, lumbar muscle strain, pyelonephritis, nephrolithiasis     All historical, clinical findings were reviewed with patient. At present, the patient's mechanism of injury as well as the location leans heavily towards thoracic strain with additional pain likely from nonobstructing stone in the right kidney.  Clinical picture consistent with benign musculoskeletal back pain without any red flags to indicate need for emergent imaging.  There are no findings worrisome for neurologic deficit or infection. No fevers/trauma/bowel/bladder dysfunction/leg weakness/hx of cancer or IVDA, exam w/o evidence to suggest cord compression, cauda equina or retroperitoneal process.     No further intervention is indicated at this time and I am of the belief that that it is safe to discharge the patient from the emergency department.  Patient has been counseled regarding the need for follow-up as well as the indications to return to the emergency room should new or worrisome developments (including,but not limited to: worsening pain, saddle anesthesia, loss of bowel or bladder control, loss of strength or sensation) occur. Referral placed to PM&R and urology. Additionally, patient instructed to follow up with PCP in 2-3 days for recheck of today's complaints.    Discharge and follow-up instructions discussed with the patient who expressed understanding and willingness to comply with recommendations. Patient discharged from the emergency department in stable condition, in no acute distress.     Amount and/or Complexity of Data Reviewed  Labs:  Decision-making details documented in ED Course.  Radiology: ordered.    Risk  OTC drugs.  Prescription drug management.               ED Course as of 01/24/24 2208 Tue Jan 23, 2024   1809 Leukocytes, UA(!): 1+ [OB]   1809 WBC, UA(!): 13 [OB]   1809 RBC, UA(!): 14  CT renal stone ordered [OB]   1922 Discussed with Dr. Melissa. Agrees that patient is stable to d/c with PCP follow up. Will treat for UTI.  [OB]   1922 CT independently reviewed: mpression:     1. No acute abnormality is identified.  2. Probable 9 mm lipoma in the head of the pancreas.  3. Small 7 mm hyperdense focus at the lower pole the right kidney is indeterminate, possibly a nonobstructing stone, complex cyst or small hyperdense mass.  [OB]      ED Course User Index  [OB] Niecy Mendoza PA-C                           Clinical Impression:  Final diagnoses:  [N30.01] Acute cystitis with hematuria (Primary)  [M54.50] Lumbar back pain  [N28.1] Cyst of left kidney          ED Disposition Condition    Discharge Stable          ED Prescriptions       Medication Sig Dispense Start Date End Date Auth. Provider    ondansetron (ZOFRAN-ODT) 4 MG TbDL Take 1 tablet (4 mg total) by mouth 2 (two) times daily. for 3 days 6 tablet 1/23/2024 1/26/2024 Kelly  ALBINA Pemberton    ketorolac (TORADOL) 10 mg tablet Take 1 tablet (10 mg total) by mouth every 6 (six) hours. for 3 days 12 tablet 1/23/2024 1/26/2024 Niecy Mendoza PA-C    methocarbamoL (ROBAXIN) 500 MG Tab Take 1 tablet (500 mg total) by mouth 4 (four) times daily. for 10 days 40 tablet 1/23/2024 2/2/2024 Niecy Mendoza PA-C    nitrofurantoin, macrocrystal-monohydrate, (MACROBID) 100 MG capsule Take 1 capsule (100 mg total) by mouth 2 (two) times daily. for 5 days 10 capsule 1/23/2024 1/28/2024 Niecy Mendoza PA-C          Follow-up Information       Follow up With Specialties Details Why Contact Info Additional Information    [facility] Family Medicine Call in 3 days As needed, If symptoms worsen [address], [address]  [facility] [address] [phone] Please park in [address] or [address] and use [address]a [address]ma. Take Medical Office Bldg. elevators.             Niecy Mendoza PA-C  01/24/24 3573

## 2024-01-26 LAB — BACTERIA UR CULT: ABNORMAL

## 2024-01-28 ENCOUNTER — TELEPHONE (OUTPATIENT)
Dept: EMERGENCY MEDICINE | Facility: HOSPITAL | Age: 59
End: 2024-01-28
Payer: COMMERCIAL

## 2024-01-28 RX ORDER — CEPHALEXIN 500 MG/1
500 CAPSULE ORAL EVERY 12 HOURS
Qty: 14 CAPSULE | Refills: 0 | OUTPATIENT
Start: 2024-01-28 | End: 2024-02-04

## 2024-02-01 ENCOUNTER — HOSPITAL ENCOUNTER (EMERGENCY)
Facility: HOSPITAL | Age: 59
Discharge: HOME | End: 2024-02-01
Attending: STUDENT IN AN ORGANIZED HEALTH CARE EDUCATION/TRAINING PROGRAM
Payer: MEDICAID

## 2024-02-01 VITALS
BODY MASS INDEX: 23.07 KG/M2 | SYSTOLIC BLOOD PRESSURE: 161 MMHG | TEMPERATURE: 98 F | DIASTOLIC BLOOD PRESSURE: 86 MMHG | RESPIRATION RATE: 18 BRPM | OXYGEN SATURATION: 98 % | HEIGHT: 67 IN | WEIGHT: 147 LBS | HEART RATE: 78 BPM

## 2024-02-01 DIAGNOSIS — N28.1 RENAL CYST: ICD-10-CM

## 2024-02-01 DIAGNOSIS — N30.01 ACUTE CYSTITIS WITH HEMATURIA: Primary | ICD-10-CM

## 2024-02-01 DIAGNOSIS — M54.50 RIGHT LOW BACK PAIN, UNSPECIFIED CHRONICITY, UNSPECIFIED WHETHER SCIATICA PRESENT: ICD-10-CM

## 2024-02-01 LAB
ALBUMIN SERPL BCP-MCNC: 3.9 G/DL (ref 3.5–5.2)
ALP SERPL-CCNC: 104 U/L (ref 55–135)
ALT SERPL W/O P-5'-P-CCNC: 40 U/L (ref 10–44)
ANION GAP SERPL CALC-SCNC: 11 MMOL/L (ref 8–16)
AST SERPL-CCNC: 35 U/L (ref 10–40)
BACTERIA #/AREA URNS HPF: ABNORMAL /HPF
BASOPHILS # BLD AUTO: 0.01 K/UL (ref 0–0.2)
BASOPHILS NFR BLD: 0.2 % (ref 0–1.9)
BILIRUB SERPL-MCNC: 0.4 MG/DL (ref 0.1–1)
BILIRUB UR QL STRIP: NEGATIVE
BUN SERPL-MCNC: 19 MG/DL (ref 6–20)
CALCIUM SERPL-MCNC: 10.9 MG/DL (ref 8.7–10.5)
CHLORIDE SERPL-SCNC: 106 MMOL/L (ref 95–110)
CLARITY UR: CLEAR
CO2 SERPL-SCNC: 23 MMOL/L (ref 23–29)
COLOR UR: YELLOW
CREAT SERPL-MCNC: 1.2 MG/DL (ref 0.5–1.4)
DIFFERENTIAL METHOD BLD: ABNORMAL
EOSINOPHIL # BLD AUTO: 0 K/UL (ref 0–0.5)
EOSINOPHIL NFR BLD: 0 % (ref 0–8)
ERYTHROCYTE [DISTWIDTH] IN BLOOD BY AUTOMATED COUNT: 15.1 % (ref 11.5–14.5)
EST. GFR  (NO RACE VARIABLE): 53 ML/MIN/1.73 M^2
GLUCOSE SERPL-MCNC: 97 MG/DL (ref 70–110)
GLUCOSE UR QL STRIP: NEGATIVE
HCT VFR BLD AUTO: 37.5 % (ref 37–48.5)
HGB BLD-MCNC: 12.4 G/DL (ref 12–16)
HGB UR QL STRIP: ABNORMAL
IMM GRANULOCYTES # BLD AUTO: 0.02 K/UL (ref 0–0.04)
IMM GRANULOCYTES NFR BLD AUTO: 0.4 % (ref 0–0.5)
KETONES UR QL STRIP: NEGATIVE
LEUKOCYTE ESTERASE UR QL STRIP: ABNORMAL
LYMPHOCYTES # BLD AUTO: 1.1 K/UL (ref 1–4.8)
LYMPHOCYTES NFR BLD: 20.4 % (ref 18–48)
MCH RBC QN AUTO: 29.7 PG (ref 27–31)
MCHC RBC AUTO-ENTMCNC: 33.1 G/DL (ref 32–36)
MCV RBC AUTO: 90 FL (ref 82–98)
MICROSCOPIC COMMENT: ABNORMAL
MONOCYTES # BLD AUTO: 0.5 K/UL (ref 0.3–1)
MONOCYTES NFR BLD: 8.9 % (ref 4–15)
NEUTROPHILS # BLD AUTO: 3.8 K/UL (ref 1.8–7.7)
NEUTROPHILS NFR BLD: 70.1 % (ref 38–73)
NITRITE UR QL STRIP: NEGATIVE
NRBC BLD-RTO: 0 /100 WBC
PH UR STRIP: 6 [PH] (ref 5–8)
PLATELET # BLD AUTO: 304 K/UL (ref 150–450)
PMV BLD AUTO: 10 FL (ref 9.2–12.9)
POTASSIUM SERPL-SCNC: 4.6 MMOL/L (ref 3.5–5.1)
PROT SERPL-MCNC: 8.1 G/DL (ref 6–8.4)
PROT UR QL STRIP: ABNORMAL
RBC # BLD AUTO: 4.18 M/UL (ref 4–5.4)
RBC #/AREA URNS HPF: 64 /HPF (ref 0–4)
SODIUM SERPL-SCNC: 140 MMOL/L (ref 136–145)
SP GR UR STRIP: 1.03 (ref 1–1.03)
SQUAMOUS #/AREA URNS HPF: 1 /HPF
URN SPEC COLLECT METH UR: ABNORMAL
UROBILINOGEN UR STRIP-ACNC: NEGATIVE EU/DL
WBC # BLD AUTO: 5.48 K/UL (ref 3.9–12.7)
WBC #/AREA URNS HPF: 16 /HPF (ref 0–5)

## 2024-02-01 PROCEDURE — 80053 COMPREHEN METABOLIC PANEL: CPT | Performed by: PHYSICIAN ASSISTANT

## 2024-02-01 PROCEDURE — 85025 COMPLETE CBC W/AUTO DIFF WBC: CPT | Performed by: PHYSICIAN ASSISTANT

## 2024-02-01 PROCEDURE — 25500020 PHARM REV CODE 255: Performed by: STUDENT IN AN ORGANIZED HEALTH CARE EDUCATION/TRAINING PROGRAM

## 2024-02-01 PROCEDURE — 87086 URINE CULTURE/COLONY COUNT: CPT | Performed by: PHYSICIAN ASSISTANT

## 2024-02-01 PROCEDURE — 96374 THER/PROPH/DIAG INJ IV PUSH: CPT

## 2024-02-01 PROCEDURE — 96375 TX/PRO/DX INJ NEW DRUG ADDON: CPT

## 2024-02-01 PROCEDURE — 63600175 PHARM REV CODE 636 W HCPCS: Performed by: PHYSICIAN ASSISTANT

## 2024-02-01 PROCEDURE — 99285 EMERGENCY DEPT VISIT HI MDM: CPT | Mod: 25

## 2024-02-01 PROCEDURE — 81000 URINALYSIS NONAUTO W/SCOPE: CPT | Performed by: PHYSICIAN ASSISTANT

## 2024-02-01 RX ORDER — CYCLOBENZAPRINE HCL 10 MG
10 TABLET ORAL 3 TIMES DAILY PRN
Qty: 15 TABLET | Refills: 0 | Status: SHIPPED | OUTPATIENT
Start: 2024-02-01 | End: 2024-02-06

## 2024-02-01 RX ORDER — KETOROLAC TROMETHAMINE 30 MG/ML
15 INJECTION, SOLUTION INTRAMUSCULAR; INTRAVENOUS
Status: COMPLETED | OUTPATIENT
Start: 2024-02-01 | End: 2024-02-01

## 2024-02-01 RX ORDER — NAPROXEN 500 MG/1
500 TABLET ORAL 2 TIMES DAILY WITH MEALS
Qty: 30 TABLET | Refills: 0 | Status: SHIPPED | OUTPATIENT
Start: 2024-02-01

## 2024-02-01 RX ORDER — DEXAMETHASONE SODIUM PHOSPHATE 4 MG/ML
8 INJECTION, SOLUTION INTRA-ARTICULAR; INTRALESIONAL; INTRAMUSCULAR; INTRAVENOUS; SOFT TISSUE
Status: COMPLETED | OUTPATIENT
Start: 2024-02-01 | End: 2024-02-01

## 2024-02-01 RX ADMIN — DEXAMETHASONE SODIUM PHOSPHATE 8 MG: 4 INJECTION, SOLUTION INTRAMUSCULAR; INTRAVENOUS at 04:02

## 2024-02-01 RX ADMIN — IOHEXOL 75 ML: 350 INJECTION, SOLUTION INTRAVENOUS at 02:02

## 2024-02-01 RX ADMIN — KETOROLAC TROMETHAMINE 15 MG: 30 INJECTION, SOLUTION INTRAMUSCULAR; INTRAVENOUS at 02:02

## 2024-02-01 NOTE — FIRST PROVIDER EVALUATION
Emergency Department TeleTriage Encounter Note      CHIEF COMPLAINT    Chief Complaint   Patient presents with    Low-back Pain     Right sided back pain x 1 week, denies urinary symptoms, denies falls/ trauma, pt currently on antibiotics for UTI, denies fevers + increased pain with movement        VITAL SIGNS   Initial Vitals [02/01/24 1121]   BP Pulse Resp Temp SpO2   (!) 188/99 85 18 97.6 °F (36.4 °C) 95 %      MAP       --            ALLERGIES    Review of patient's allergies indicates:   Allergen Reactions    Ace inhibitors Other (See Comments)     Angioedema         PROVIDER TRIAGE NOTE  This is a teletriage evaluation of a 58 y.o. female presenting to the ED complaining of flank pain. Patient reports right sided low back pain for one week. Pain radiates to lower right quadrant. She is currently being treated for UTI.     Patient is alert and oriented. She speaks in complete sentences. She is sitting upright in the chair in no distress.     Initial orders will be placed and care will be transferred to an alternate provider when patient is roomed for a full evaluation. Any additional orders and the final disposition will be determined by that provider.         ORDERS  Labs Reviewed - No data to display    ED Orders (720h ago, onward)      Start Ordered     Status Ordering Provider    Unscheduled 02/01/24 1127  Urinalysis, Reflex to Urine Culture Urine, Clean Catch  STAT         Ordered BLAINE BUTCHER              Virtual Visit Note: The provider triage portion of this emergency department evaluation and documentation was performed via [email or web address], a HIPAA-compliant telemedicine application, in concert with a tele-presenter in the room. A face to face patient evaluation with one of my colleagues will occur once the patient is placed in an emergency department room.      DISCLAIMER: This note was prepared with [employer] voice recognition transcription software. Garbled syntax, mangled pronouns, and other bizarre  constructions may be attributed to that software system.

## 2024-02-01 NOTE — ED PROVIDER NOTES
Encounter Date: 2/1/2024       History     Chief Complaint   Patient presents with    Low-back Pain     Right sided back pain x 1 week, denies urinary symptoms, denies falls/ trauma, pt currently on antibiotics for UTI, denies fevers + increased pain with movement      58-year-old female presents to ED with concern of continued right-sided back pain.  Seen in ED on 01/23 with similar complaint.  Noted have UTI and prescribed Macrobid.  Urine culture showing resistance and medication was changed to Keflex on 01/28.  She states she is continued taking her Keflex as instructed.  Denying any current urinary symptoms, specifically no dysuria, changes in urine color or frequency or hematuria.  No vaginal bleeding or discharge.  She describes pain to right back as dull, worse with touch or movement, radiating towards right hip, severity 6/10.  Minimal improvement from home medications.  No other acute complaints at this time.    The history is provided by the patient.     Review of patient's allergies indicates:   Allergen Reactions    Ace inhibitors Other (See Comments)     Angioedema       Past Medical History:   Diagnosis Date    Hypertension     Stroke 2017     Past Surgical History:   Procedure Laterality Date    TUBAL LIGATION       Family History   Problem Relation Age of Onset    Hypertension Mother     Hypotension Father      Social History     Tobacco Use    Smoking status: Every Day     Current packs/day: 0.50     Types: Cigarettes    Smokeless tobacco: Never   Substance Use Topics    Alcohol use: Yes     Comment: socially    Drug use: Yes     Types: Marijuana     Comment: occ     Review of Systems   Constitutional:  Negative for chills and fever.   Cardiovascular:  Negative for chest pain.   Gastrointestinal:  Negative for abdominal pain, nausea and vomiting.   Genitourinary:  Negative for dysuria, flank pain, hematuria, vaginal bleeding and vaginal discharge.   Musculoskeletal:  Positive for back pain.  Negative for neck pain.   Neurological:  Negative for weakness and numbness.       Physical Exam     Initial Vitals [02/01/24 1121]   BP Pulse Resp Temp SpO2   (!) 188/99 85 18 97.6 °F (36.4 °C) 95 %      MAP       --         Physical Exam    Nursing note and vitals reviewed.  Constitutional: She appears well-developed and well-nourished. She is cooperative. She does not have a sickly appearance. She does not appear ill. No distress.   HENT:   Head: Normocephalic and atraumatic.   Eyes: EOM are normal.   Neck:   Normal range of motion.  Cardiovascular:  Normal rate.           Pulmonary/Chest: Effort normal.   Abdominal:   Denying any abdominal or suprapubic tenderness.  No CVA tenderness.   Musculoskeletal:      Cervical back: Normal range of motion.      Comments: Right-sided lower lumbar paraspinal muscle tenderness extending near SI joint.  No obvious physical or palpable deformities.  No skin changes or rashes.  Mild tenderness to right anterior hip with pain worsened from internal/external rotation.  BLE sensations intact.  No lower extremity edema.  Ambulatory in ED with stable gait without assistance     Neurological: She is alert and oriented to person, place, and time. GCS eye subscore is 4. GCS verbal subscore is 5. GCS motor subscore is 6.   Psychiatric: She has a normal mood and affect. Her speech is normal and behavior is normal.         ED Course   Procedures  Labs Reviewed   URINALYSIS, REFLEX TO URINE CULTURE - Abnormal; Notable for the following components:       Result Value    Protein, UA Trace (*)     Occult Blood UA 2+ (*)     Leukocytes, UA 2+ (*)     All other components within normal limits    Narrative:     Specimen Source->Urine   URINALYSIS MICROSCOPIC - Abnormal; Notable for the following components:    RBC, UA 64 (*)     WBC, UA 16 (*)     All other components within normal limits    Narrative:     Specimen Source->Urine   CBC W/ AUTO DIFFERENTIAL - Abnormal; Notable for the following  components:    RDW 15.1 (*)     All other components within normal limits   COMPREHENSIVE METABOLIC PANEL - Abnormal; Notable for the following components:    Calcium 10.9 (*)     eGFR 53 (*)     All other components within normal limits   CULTURE, URINE          Imaging Results              CT Abdomen Pelvis With IV Contrast NO Oral Contrast (Final result)  Result time 02/01/24 15:41:25      Final result by Miguel Ángel Pate MD (02/01/24 15:41:25)                   Impression:      1. Leiomyomatous uterus.  2. Findings suggesting hepatic steatosis, correlation with LFTs recommended.  3. No findings to suggest obstructive uropathy.  Low attenuating renal lesions can be evaluated with ultrasound on a nonemergent basis as warranted.  4. Please see above for several additional findings.      Electronically signed by: Miguel Ángel Pate MD  Date:    02/01/2024  Time:    15:41               Narrative:    EXAMINATION:  CT ABDOMEN PELVIS WITH IV CONTRAST    CLINICAL HISTORY:  Flank pain, kidney stone suspected;    TECHNIQUE:  Low dose axial images, sagittal and coronal reformations were obtained from the lung bases to the pubic symphysis following the IV administration of 75 mL of Omnipaque 350 .  Oral contrast was not given.    COMPARISON:  01/23/2024    FINDINGS:  Images of the lower thorax are remarkable for minimal dependent atelectasis.    There is a minimal hiatal hernia.  The hepatic parenchyma is hypoattenuating, suggesting steatosis, correlation with LFTs recommended.  Punctate low attenuating lesions are noted within the right and left hepatic lobes, too small for characterization.  The spleen, pancreas, gallbladder and adrenal glands are grossly unremarkable.  There is no biliary dilation or ascites.  The portal vein, splenic vein, SMV, celiac axis and SMA all are patent.  No significant abdominal lymphadenopathy.    The kidneys enhance symmetrically without hydronephrosis or nephrolithiasis.  Several  subcentimeter low attenuating lesions arise from the kidneys bilaterally.  There is a cyst arising from the upper pole/interpolar region of the left kidney measuring 6 cm.  The bilateral ureters are unable to be followed in their entirety to the urinary bladder, no definite calculi seen or secondary findings to suggest obstructive uropathy.  The urinary bladder is distended without wall thickening.  The uterus is enlarged noting multiple leiomyoma.  Largest anteriorly measures 7.3 x 8.1 cm.  The adnexa is grossly unremarkable.  There is trace fluid in the pelvis.    There is moderate stool throughout the colon.  The terminal ileum is unremarkable.  The appendix is not confidently identified, no pericecal inflammation.  The small bowel is grossly unremarkable.  There are several scattered shotty periaortic, pericaval, and mesenteric lymph nodes.  There is atherosclerotic calcification of the aorta and its branches.    There are degenerative changes of the bilateral sacroiliac joints, pubic symphysis, and spine.  No significant inguinal lymphadenopathy.                                       X-Ray Hip 2 or 3 views Right (with Pelvis when performed) (Final result)  Result time 02/01/24 13:01:03      Final result by Miguel Ángel Pate MD (02/01/24 13:01:03)                   Impression:      1. No convincing acute displaced fracture or dislocation of the right hip or pelvis.      Electronically signed by: Miguel Ángel Pate MD  Date:    02/01/2024  Time:    13:01               Narrative:    EXAMINATION:  XR HIP WITH PELVIS WHEN PERFORMED, 2 OR 3  VIEWS RIGHT    CLINICAL HISTORY:  right hip pain;    TECHNIQUE:  AP view of the pelvis and frog leg lateral view of the right hip were performed.    COMPARISON:  None    FINDINGS:  Three views right hip.    The bilateral sacroiliac joints are intact.  The pubic symphysis is intact.  The bilateral femoroacetabular joints are intact noting degenerative changes.  No acute displaced  fracture or dislocation of the right hip.                                       Medications   iohexoL (OMNIPAQUE 350) injection 75 mL (75 mLs Intravenous Given 2/1/24 1427)   ketorolac injection 15 mg (15 mg Intravenous Given 2/1/24 1434)   dexAMETHasone injection 8 mg (8 mg Intravenous Given 2/1/24 1618)     Medical Decision Making  Patient presents with concern of continued lower back pain.  Seen in ED recently for similar complaint.  Afebrile.  Reproducible tenderness to right-sided lower lumbar paraspinal muscle.  Mild pain from internal/external rotation of right lower extremity.  Appearing neurovascularly intact.  Denying abdominal or CVA tenderness.    DDx:  Including but not limited to UTI, pyelonephritis, nephrolithiasis, urolithiasis, musculoskeletal, strain, sprain, spasm, radiculopathy, neuropathy, sciatica, arthritis    UA does continued to show WBC and RBC.  Review of recent urine culture showing sensitivity to prescribe Keflex.  Encouraged to continue her Keflex as instructed.  CT showing renal cyst; ambulatory referral has been sent to Urology.  With careful consideration, I do have higher suspicion patient's back pain is musculoskeletal.  Given Toradol and Decadron in ED with reported improvement.  Prescription written for naproxen and Flexeril.  Encouraged activities and movements as tolerated, ice/heat, close monitoring with close PCP follow-up.  ED return precautions were discussed.  Patient states her understanding and agrees with plan.    Patient discussed with attending, Dr. Quiñones, who agrees with ED course and dispo.    Amount and/or Complexity of Data Reviewed  Labs: ordered. Decision-making details documented in ED Course.     Details: CBC unremarkable no elevation WBC.  CMP appearing grossly unremarkable near baseline.  Creatinine 1.2.  UA is nitrite negative but does show 64 RBC with 16 WBC.  Urine sent to culture.  Radiology: ordered.     Details: CT abdomen pelvis with contrast is  noting renal cysts but no other significant acute intra-abdominal findings noted.  Leiomyomatous uterus.  X-ray right hip and pelvis per my interpretation showing no large displaced fracture or dislocation.  Images reviewed by Radiology, noting mild arthritic changes    Risk  Prescription drug management.               ED Course as of 02/01/24 1658   Thu Feb 01, 2024   1416 CBC auto differential(!)  WNL. No elevation WBC [KS]   1416 Urinalysis, Reflex to Urine Culture Urine, Clean Catch(!)  Nitrite neg. 64 RBC with 16 WBC [KS]      ED Course User Index  [KS] Juan Ramon Johns PA-C                           Clinical Impression:  Final diagnoses:  [N30.01] Acute cystitis with hematuria (Primary)  [M54.50] Right low back pain, unspecified chronicity, unspecified whether sciatica present  [N28.1] Renal cyst          ED Disposition Condition    Discharge Stable          ED Prescriptions       Medication Sig Dispense Start Date End Date Auth. Provider    naproxen (NAPROSYN) 500 MG tablet Take 1 tablet (500 mg total) by mouth 2 (two) times daily with meals. 30 tablet 2/1/2024 -- Juan Ramon Johns PA-C    cyclobenzaprine (FLEXERIL) 10 MG tablet Take 1 tablet (10 mg total) by mouth 3 (three) times daily as needed for Muscle spasms. 15 tablet 2/1/2024 2/6/2024 Juan Ramon Johns PA-C          Follow-up Information       Follow up With Specialties Details Why Contact Info    Your Doctor                 Juan Ramon Johns PA-C  02/01/24 7163

## 2024-02-01 NOTE — DISCHARGE INSTRUCTIONS

## 2024-02-01 NOTE — Clinical Note
"Chris De La Paz ([place]) was seen and treated in our emergency department on 2/1/2024.  She may return to work on 02/03/2024.       If you have any questions or concerns, please don't hesitate to call.      Juan Ramon Johns"

## 2024-02-02 ENCOUNTER — HOSPITAL ENCOUNTER (EMERGENCY)
Facility: HOSPITAL | Age: 59
Discharge: HOME | End: 2024-02-02
Attending: EMERGENCY MEDICINE
Payer: MEDICAID

## 2024-02-02 VITALS
HEIGHT: 67 IN | HEART RATE: 110 BPM | DIASTOLIC BLOOD PRESSURE: 105 MMHG | BODY MASS INDEX: 23.07 KG/M2 | WEIGHT: 147 LBS | SYSTOLIC BLOOD PRESSURE: 150 MMHG | RESPIRATION RATE: 18 BRPM | TEMPERATURE: 98 F | OXYGEN SATURATION: 95 %

## 2024-02-02 DIAGNOSIS — M54.41 CHRONIC RIGHT-SIDED LOW BACK PAIN WITH RIGHT-SIDED SCIATICA: ICD-10-CM

## 2024-02-02 DIAGNOSIS — G89.29 CHRONIC RIGHT-SIDED LOW BACK PAIN WITH RIGHT-SIDED SCIATICA: ICD-10-CM

## 2024-02-02 DIAGNOSIS — G89.29 ACUTE EXACERBATION OF CHRONIC LOW BACK PAIN: Primary | ICD-10-CM

## 2024-02-02 DIAGNOSIS — M54.50 ACUTE EXACERBATION OF CHRONIC LOW BACK PAIN: Primary | ICD-10-CM

## 2024-02-02 LAB — BACTERIA UR CULT: NO GROWTH

## 2024-02-02 PROCEDURE — 25000003 PHARM REV CODE 250: Performed by: PHYSICIAN ASSISTANT

## 2024-02-02 PROCEDURE — 99283 EMERGENCY DEPT VISIT LOW MDM: CPT

## 2024-02-02 RX ORDER — OXYCODONE AND ACETAMINOPHEN 5; 325 MG/1; MG/1
1 TABLET ORAL EVERY 4 HOURS PRN
Qty: 15 TABLET | Refills: 0 | Status: SHIPPED | OUTPATIENT
Start: 2024-02-02 | End: 2024-02-05

## 2024-02-02 RX ORDER — OXYCODONE HYDROCHLORIDE 5 MG/1
5 TABLET ORAL
Status: COMPLETED | OUTPATIENT
Start: 2024-02-02 | End: 2024-02-02

## 2024-02-02 RX ADMIN — OXYCODONE 5 MG: 5 TABLET ORAL at 01:02

## 2024-02-02 NOTE — ED NOTES
Patient identifiers for Chris De La Paz 58 y.o. female checked and correct.  Chief Complaint   Patient presents with    Leg Pain     Seen at [place] yesterday for similar complaint. States pain started in back and radiating into leg     Past Medical History:   Diagnosis Date    Hypertension     Stroke 2017     LOC: Patient is awake, alert, and aware of environment with an appropriate affect. Patient is oriented x 3 and speaking appropriately.  APPEARANCE: Patient resting comfortably and in no acute distress. Patient is clean and well groomed, patient's clothing is properly fastened.  HEENT: WDL  SKIN: The skin is warm and dry. Patient has normal skin turgor and moist mucus membranes. Skin is intact; no bruising or breakdown noted.  MUSKULOSKELETAL: + lower back pain, + right leg pain  RESPIRATORY: Airway is open and patent. Respirations are spontaneous and non-labored with normal effort and rate, BBS=clear  CARDIAC: No peripheral edema noted.   ABDOMEN: No distention noted. Bowel sounds active in all 4 quadrants. Soft and non-tender upon palpation.  NEUROLOGICAL: PERRL. Facial expression is symmetrical. Hand grasps are equal bilaterally. Normal sensation in all extremities when touched with finger.   awake in bed. +3L/min o2 via nc, +bilateral nephrostomy tubes, +tele monitor

## 2024-02-02 NOTE — ED PROVIDER NOTES
Encounter Date: 2/2/2024       History     Chief Complaint   Patient presents with    Leg Pain     Seen at [place] yesterday for similar complaint. States pain started in back and radiating into leg     The patient is a 58-year-old female. She has a past medical history of HTN, CVA, and chronic low back pain. She was seen in the ER on 01/23 for this and was diagnosed with a UTI and is currently taking Keflex. She states that her urinary symptoms have completely resolved but that she continues to have low back pain, right sided, radiating to her right thigh. She went back to the ER yesterday and had an expanded work up, including labs and a CT scan of her abdomen & pelvis with IV contrast. Her work-up was essentially unremarkable. Her pain was felt to be musculoskeletal and she was given IM Toradol and Steroid. She was discharged home with Naproxen and Flexeril. She has been taking medications as prescribed, but reports minimal improvement. She states that the pain is worse with bending, turning, twisting her trunk. She states that the pain shoots down her right lower back to her upper medial thigh. She states that she has had low back pain problems since 2021, and has had multiple low back x rays, but never MRI. She states that her low back pain is typically relieved with NSAID's. She denies any fever, chills, saddle paresthesia, difficulty walking/weight bearing. She denies any bowel or bladder dysfunction. She denies any numbness, weakness, or tingling.       Review of patient's allergies indicates:   Allergen Reactions    Ace inhibitors Other (See Comments)     Angioedema       Past Medical History:   Diagnosis Date    Hypertension     Stroke 2017     Past Surgical History:   Procedure Laterality Date    TUBAL LIGATION       Family History   Problem Relation Age of Onset    Hypertension Mother     Hypotension Father      Social History     Tobacco Use    Smoking status: Every Day     Current packs/day: 0.50      Types: Cigarettes    Smokeless tobacco: Never   Substance Use Topics    Alcohol use: Yes     Comment: socially    Drug use: Yes     Types: Marijuana     Comment: occ     Review of Systems   Constitutional:  Negative for chills and fever.   Respiratory:  Negative for shortness of breath.    Cardiovascular:  Negative for chest pain, palpitations and leg swelling.   Gastrointestinal:  Negative for abdominal pain, diarrhea, nausea and vomiting.   Genitourinary:  Negative for decreased urine volume, difficulty urinating, dysuria, flank pain, frequency, menstrual problem, pelvic pain and urgency.   Musculoskeletal:  Positive for back pain. Negative for arthralgias, joint swelling and neck pain.   Skin:  Negative for color change and rash.   Neurological:  Negative for syncope, weakness, light-headedness and numbness.   Psychiatric/Behavioral:  Negative for confusion.        Physical Exam     Initial Vitals [02/02/24 1113]   BP Pulse Resp Temp SpO2   (!) 150/105 110 20 98.3 °F (36.8 °C) 95 %      MAP       --         Physical Exam    Constitutional: She appears well-developed and well-nourished. She is not diaphoretic.   Alert and ambulatory. Non-toxic appearing. Mildly uncomfortable appearing. Accompanied by her .    HENT:   Head: Normocephalic.   Mouth/Throat: Oropharynx is clear and moist.   Eyes: Conjunctivae are normal.   Cardiovascular:  Normal rate and intact distal pulses.           Pulmonary/Chest: No respiratory distress.   Abdominal: Abdomen is soft. She exhibits no distension. There is no abdominal tenderness. There is no rebound and no guarding.   Musculoskeletal:      Cervical back: No tenderness.      Thoracic back: No tenderness. Normal range of motion.      Lumbar back: Tenderness present.        Back:       Comments: Right lumbar paraspinal muscle tenderness, right SI joint region tenderness, right sciatic nerve distribution tenderness. FROM of right hip observed. No lower extremity edema,  erythema, ecchymosis, increased warmth, or calf pain.      Neurological: She is alert and oriented to person, place, and time. She has normal strength. No sensory deficit.   Normal gait observed. Strength sensation intact.    Skin: Skin is warm and dry. No rash noted. No erythema.   Psychiatric: She has a normal mood and affect. Her behavior is normal.         ED Course   Procedures  Labs Reviewed - No data to display         Imaging Results    None          Medications   oxyCODONE immediate release tablet 5 mg (5 mg Oral Given 2/2/24 1308)     Medical Decision Making  Amount and/or Complexity of Data Reviewed  Labs: ordered.    Risk  Prescription drug management.                          Medical Decision Making:   Initial Assessment:   57 yo female, presents to the ER for an urgent evaluation due to an acute exacerbation of chronic low back pain x 1 week, unimproved despite treatment by outside ER yesterday with IM Toradol, steroid, and Rx for Naproxen and Flexeril.   Differential Diagnosis:   Lumbar radiculopathy, sciatica, DDD, lumbar strain, uti, etc   I considered but do not suspect epidural abscess, cauda equina, or diskitis based on history, exam, and work up   Clinical Tests:   Lab Tests: Reviewed  ED Management:  Vital signs reviewed, afebrile, mildly tachycardic and hypertensive - attributed to pain   Chart review completed - previous lumbar series reviewed back to 2021 - lumbar DDD   ER records and work up from this week reviewed   No red flag symptoms or risk factors  MRI lumbar spine recommended, pt prefers to have done outpatient - ambulatory referral to spine clinic ordered   Small qty of Oxycodone provided to take in addition to current regimen for uncontrolled pain   Patient advised to follow up with her PCP in the next 1-2 days for re-evaluation and further management   Patient advised to follow up with spine clinic at next available   Patient advised to return to the ER promptly if unimproved or  if worse in any way.              Clinical Impression:  Final diagnoses:  [M54.41, G89.29] Chronic right-sided low back pain with right-sided sciatica  [M54.50, G89.29] Acute exacerbation of chronic low back pain (Primary)          ED Disposition Condition    Discharge Stable          ED Prescriptions       Medication Sig Dispense Start Date End Date Auth. Provider    oxyCODONE-acetaminophen (PERCOCET) 5-325 mg per tablet Take 1 tablet by mouth every 4 (four) hours as needed for Pain. 15 tablet 2/2/2024 2/5/2024 Shaquille Carrasco PA-C          Follow-up Information       Follow up With Specialties Details Why Contact Info Additional Information    [facility] [address] [facility] Schedule an appointment as soon as possible for a visit  Follow up with [facility] at next available for re-evaluation and further management. MRI spine is recommended. [address] [facility] [id number] [phone] [facility] - Main Building, 5th Floor Please park in [facility] and use Atrium elevator    [facility] - Emergency Dept Emergency Medicine  Follow up with your primary care physician in the next 2 days. Take all medications as prescribed. Return to the ER if unimproved or if worse. [address]  [facility] [id number] [phone]              Shaquille Carrasco PA-C  02/02/24 1318

## 2024-02-04 ENCOUNTER — HOSPITAL ENCOUNTER (EMERGENCY)
Facility: HOSPITAL | Age: 59
Discharge: HOME | End: 2024-02-04
Attending: EMERGENCY MEDICINE
Payer: MEDICAID

## 2024-02-04 VITALS
RESPIRATION RATE: 18 BRPM | DIASTOLIC BLOOD PRESSURE: 87 MMHG | WEIGHT: 147 LBS | HEART RATE: 90 BPM | OXYGEN SATURATION: 96 % | BODY MASS INDEX: 23.02 KG/M2 | TEMPERATURE: 98 F | SYSTOLIC BLOOD PRESSURE: 166 MMHG

## 2024-02-04 DIAGNOSIS — M54.41 CHRONIC RIGHT-SIDED LOW BACK PAIN WITH RIGHT-SIDED SCIATICA: ICD-10-CM

## 2024-02-04 DIAGNOSIS — M79.604 PAIN OF RIGHT LOWER EXTREMITY: Primary | ICD-10-CM

## 2024-02-04 DIAGNOSIS — G89.29 CHRONIC RIGHT-SIDED LOW BACK PAIN WITH RIGHT-SIDED SCIATICA: ICD-10-CM

## 2024-02-04 LAB
BACTERIA #/AREA URNS HPF: ABNORMAL /HPF
BILIRUB UR QL STRIP: NEGATIVE
CLARITY UR: CLEAR
COLOR UR: YELLOW
GLUCOSE UR QL STRIP: NEGATIVE
HGB UR QL STRIP: ABNORMAL
KETONES UR QL STRIP: NEGATIVE
LEUKOCYTE ESTERASE UR QL STRIP: ABNORMAL
MICROSCOPIC COMMENT: ABNORMAL
NITRITE UR QL STRIP: NEGATIVE
PH UR STRIP: 7 [PH] (ref 5–8)
PROT UR QL STRIP: ABNORMAL
RBC #/AREA URNS HPF: 43 /HPF (ref 0–4)
SP GR UR STRIP: 1.02 (ref 1–1.03)
SQUAMOUS #/AREA URNS HPF: 2 /HPF
URN SPEC COLLECT METH UR: ABNORMAL
UROBILINOGEN UR STRIP-ACNC: ABNORMAL EU/DL
WBC #/AREA URNS HPF: 24 /HPF (ref 0–5)

## 2024-02-04 PROCEDURE — 99284 EMERGENCY DEPT VISIT MOD MDM: CPT | Mod: 25

## 2024-02-04 PROCEDURE — 87086 URINE CULTURE/COLONY COUNT: CPT

## 2024-02-04 PROCEDURE — 96372 THER/PROPH/DIAG INJ SC/IM: CPT

## 2024-02-04 PROCEDURE — 63600175 PHARM REV CODE 636 W HCPCS

## 2024-02-04 PROCEDURE — 25000003 PHARM REV CODE 250

## 2024-02-04 PROCEDURE — 81000 URINALYSIS NONAUTO W/SCOPE: CPT

## 2024-02-04 RX ORDER — ONDANSETRON 4 MG/1
4 TABLET, ORALLY DISINTEGRATING ORAL
Status: COMPLETED | OUTPATIENT
Start: 2024-02-04 | End: 2024-02-04

## 2024-02-04 RX ORDER — KETOROLAC TROMETHAMINE 30 MG/ML
15 INJECTION, SOLUTION INTRAMUSCULAR; INTRAVENOUS
Status: COMPLETED | OUTPATIENT
Start: 2024-02-04 | End: 2024-02-04

## 2024-02-04 RX ORDER — ONDANSETRON 4 MG/1
4 TABLET, FILM COATED ORAL EVERY 6 HOURS
Qty: 12 TABLET | Refills: 0 | Status: SHIPPED | OUTPATIENT
Start: 2024-02-04

## 2024-02-04 RX ORDER — HYDROCODONE BITARTRATE AND ACETAMINOPHEN 5; 325 MG/1; MG/1
1 TABLET ORAL EVERY 6 HOURS PRN
Qty: 3 TABLET | Refills: 0 | Status: SHIPPED | OUTPATIENT
Start: 2024-02-04

## 2024-02-04 RX ORDER — CEPHALEXIN 500 MG/1
500 CAPSULE ORAL 4 TIMES DAILY
Qty: 20 CAPSULE | Refills: 0 | Status: SHIPPED | OUTPATIENT
Start: 2024-02-04 | End: 2024-02-09

## 2024-02-04 RX ADMIN — ONDANSETRON 4 MG: 4 TABLET, ORALLY DISINTEGRATING ORAL at 03:02

## 2024-02-04 RX ADMIN — KETOROLAC TROMETHAMINE 15 MG: 30 INJECTION, SOLUTION INTRAMUSCULAR; INTRAVENOUS at 05:02

## 2024-02-04 NOTE — Clinical Note
"Chris Sage" Ana Cristina was seen and treated in our emergency department on 2/4/2024.  She may return to work on 02/06/2024.       If you have any questions or concerns, please don't hesitate to call.      Fara Lopez PA-C"

## 2024-02-05 NOTE — ED PROVIDER NOTES
Encounter Date: 2/4/2024       History     Chief Complaint   Patient presents with    Leg Pain     Right upper leg pain x 1 week ago, pt was seen 2 days ago for chronic back pain, no relief from RX meds      58-year-old female with medical history of HTN, CVA, and chronic low back pain presents to the ED with worsening leg pain x 1 week.  Of note, patient was seen in the ED on 2/02 the same complaints.  She was discharged  w/ a short course of Percocet.  Today she continues to have  R lower back pain that radiates radiating to her right thigh.  Reports no recent trauma/injury her back.  She has been treated IM Toradol, Flexeril, anti-inflammatories , steroids with no improvement of her symptoms.  She has been also treated for UTI recently.  States she forgot where she left her medications, unsure if she finish her course of antibiotics.  She reports dysuria denies frequency urgency.  She denies bowel or bladder incontinence.  No Difficulty ambulating or saddle anesthesia.  No fever, chills, nausea, vomiting, chest pain, shortness of breath, abdominal pain.  No other acute complaints today.    The history is provided by the patient.     Review of patient's allergies indicates:   Allergen Reactions    Ace inhibitors Other (See Comments)     Angioedema       Past Medical History:   Diagnosis Date    Hypertension     Stroke 2017     Past Surgical History:   Procedure Laterality Date    TUBAL LIGATION       Family History   Problem Relation Age of Onset    Hypertension Mother     Hypotension Father      Social History     Tobacco Use    Smoking status: Every Day     Current packs/day: 0.50     Types: Cigarettes    Smokeless tobacco: Never   Substance Use Topics    Alcohol use: Yes     Comment: socially    Drug use: Yes     Types: Marijuana     Comment: occ     Review of Systems   Constitutional:  Negative for appetite change, chills and fever.   HENT:  Negative for congestion.    Respiratory:  Negative for cough, chest  tightness and wheezing.    Cardiovascular:  Negative for chest pain, palpitations and leg swelling.   Gastrointestinal:  Negative for abdominal distention, abdominal pain, nausea and vomiting.   Genitourinary:  Positive for dysuria. Negative for flank pain, frequency and hematuria.   Musculoskeletal:  Positive for back pain. Negative for neck pain and neck stiffness.   Skin:  Negative for rash.   Neurological:  Negative for headaches.       Physical Exam     Initial Vitals [02/04/24 1517]   BP Pulse Resp Temp SpO2   (!) 171/91 93 18 97.9 °F (36.6 °C) 95 %      MAP       --         Physical Exam    Constitutional: She appears well-developed and well-nourished. She is not diaphoretic. No distress.   Alert and ambulatory. Non- distressed. Tearful on exam   HENT:   Head: Normocephalic and atraumatic.   Right Ear: External ear normal.   Left Ear: External ear normal.   Mouth/Throat: Oropharynx is clear and moist.   Eyes: EOM are normal. Pupils are equal, round, and reactive to light.   Neck: Neck supple.   Normal range of motion.  Cardiovascular:  Normal rate and normal heart sounds.           Pulmonary/Chest: Breath sounds normal. No respiratory distress. She has no wheezes.   Abdominal: Abdomen is soft. Bowel sounds are normal. She exhibits no distension. There is no abdominal tenderness. There is no rebound.   Musculoskeletal:         General: Tenderness present. Normal range of motion.      Cervical back: Normal range of motion and neck supple.      Comments: Mild ttp over the lumbar paraspinal muscles. No bony deformities or step-offs. No C,T,L midline spine ttp. right sciatic nerve distribution tenderness. FROM of right hip observed. No lower extremity edema, erythema, ecchymosis, increased warmth, or calf pain.  No CVA tenderness bilaterally.     Neurological: She is alert and oriented to person, place, and time. GCS score is 15. GCS eye subscore is 4. GCS verbal subscore is 5. GCS motor subscore is 6.   Skin:  Skin is warm. Capillary refill takes less than 2 seconds.   Psychiatric: She has a normal mood and affect. Her behavior is normal. Judgment and thought content normal.         ED Course   Procedures  Labs Reviewed   URINALYSIS, REFLEX TO URINE CULTURE - Abnormal; Notable for the following components:       Result Value    Protein, UA Trace (*)     Occult Blood UA 1+ (*)     Urobilinogen, UA 2.0-3.0 (*)     Leukocytes, UA 2+ (*)     All other components within normal limits    Narrative:     Specimen Source->Urine   URINALYSIS MICROSCOPIC - Abnormal; Notable for the following components:    RBC, UA 43 (*)     WBC, UA 24 (*)     All other components within normal limits    Narrative:     Specimen Source->Urine   CULTURE, URINE          Imaging Results    None          Medications   ondansetron disintegrating tablet 4 mg (4 mg Oral Given 2/4/24 1558)   ketorolac injection 15 mg (15 mg Intramuscular Given 2/4/24 5796)     Medical Decision Making  Differential Diagnosis includes, but is not limited to:  Fracture, dislocation, compartment syndrome, nerve injury/palsy, vascular injury, DVT, rhabdomyolysis, hemarthrosis, septic joint, cellulitis, bursitis, muscle strain, ligament tear/sprain, laceration, foreign body, abrasion, soft tissue contusion, osteoarthritis.      ED management     58-year-old female with medical history of HTN, CVA, and chronic low back pain presents to the ED with worsening leg pain x 1 week.Patient is not toxic appearing, hemodynamically stable and resting comfortably on bed. Patient is well-appearing.  Awake and alert.  Afebrile with vitals WNL. No distress on exam. No red flags on history.  On exam no midline tenderness, no neurologic deficits, no rash, does have tenderness to palpation of right lumbar paraspinal muscles. I considered but do not suspect fracture as no bony tenderness and no risk factors such as osteoporosis and long term steroid use, infectious etiologies such as pyelonephritis,   pneumonia due to history, intraabdominal etiologies such as pancreatitis, ulcers, or pelvic disease due to exam and history as causes to their pain.  Possibly herniated disc. Strongly advised to pt to follow-up with back orthopedics for further evaluation. Discussed at length with patient that I will only prescribe a very short course of pain medications, she understands that she will to have to rely on conservative treatment such as anti-inflammatories, muscle relaxers, applying heat or ice to the affected area.  Will also re-order Keflex for UTI. Pt stable at discharge.    I have discussed the specifics of the workup with the patient and the patient has verbalized understanding of the details of the workup, the diagnosis, the treatment plan, and the need for outpatient follow-up with PCP. ED precautions given. Discussed with pt about returning to the ED, if symptoms fail to improve or worsen.     RESULTS:  Documented in ED course.  Labs/ekg interpreted by myself               Amount and/or Complexity of Data Reviewed  Labs:  Decision-making details documented in ED Course.    Risk  Prescription drug management.               ED Course as of 02/05/24 0739   Sun Feb 04, 2024   1757 Urinalysis, Reflex to Urine Culture Urine, Clean Catch(!)  Significant for 2+ leukocytes.  [NW]   1758 RBC, UA(!): 43 [NW]   1758 WBC, UA(!): 24 [NW]      ED Course User Index  [NW] Fara Lopez PA-C                           Clinical Impression:  Final diagnoses:  [M79.604] Pain of right lower extremity (Primary)  [M54.41, G89.29] Chronic right-sided low back pain with right-sided sciatica          ED Disposition Condition    Discharge Stable          ED Prescriptions       Medication Sig Dispense Start Date End Date Auth. Provider    HYDROcodone-acetaminophen (NORCO) 5-325 mg per tablet Take 1 tablet by mouth every 6 (six) hours as needed for Pain. 3 tablet 2/4/2024 -- Fara Lopez PA-C    ondansetron (ZOFRAN) 4 MG tablet Take 1  tablet (4 mg total) by mouth every 6 (six) hours. 12 tablet 2/4/2024 -- Fara Lopez PA-C    cephALEXin (KEFLEX) 500 MG capsule Take 1 capsule (500 mg total) by mouth 4 (four) times daily. for 5 days 20 capsule 2/4/2024 2/9/2024 Fara Lopez PA-C          Follow-up Information       Follow up With Specialties Details Why Contact Info    your pcp                 Fara Lopez PA-C  02/05/24 4314

## 2024-02-05 NOTE — DISCHARGE INSTRUCTIONS
Ms. De La Paz,    Thank you for letting me care for you today! It was nice meeting you, and I hope you feel better soon.   If you would like access to your chart and what was done today please utilize the [facility].   Please don't hesitate to return if your symptoms worsen or you develop any other worrisome symptoms.    Our goal in the emergency department is to always give you outstanding care and exceptional service. You may receive a survey by mail or e-mail in the next week regarding your experience in our ED. We would greatly appreciate you completing and returning the survey. Your feedback provides us with a way to recognize our staff who give very good care and it helps us learn how to improve when your experience was below our aspiration of excellence.     Sincerely,    Fara Lopez PA-C  Emergency Department Physician Assistant  [facility], [place], and [place] [place]

## 2024-02-05 NOTE — ED NOTES
Patient identifiers verified and correct.      LOC: The patient is awake, alert and aware of environment with an appropriate affect, the patient is oriented x 4 and speaking appropriately.      APPEARANCE: Patient appears comfortable and in no acute distress, patient is clean and well groomed.     HEENT: Head symmetrical. Eyes bilateral.  Bilateral ears without drainage. Bilateral nares patent, throat clear.     SKIN: The skin is warm and dry, color consistent with ethnicity, patient has normal skin turgor and moist mucus membranes, skin intact, no breakdown or bruising noted.      MUSCULOSKELETAL: Patient moving all extremities spontaneously, no swelling noted. Reports right upper leg and lower back pain.      RESPIRATORY: Airway is open and patent, respirations are spontaneous, patient has a normal effort and rate, no accessory muscle use noted.      CARDIAC: Patient has a normal rate and regular rhythm, no edema noted, capillary refill < 3 seconds.      GASTRO: Abdomen soft and non-distended.      NEURO: Pt opens eyes spontaneously pupils equal, round, and reactive. behavior appropriate to situation, follows commands, facial expression symmetrical,   bilateral hand grasp equal and even, purposeful motor response noted, normal sensation in all extremities when touched with a finger.     NEUROVASCULAR: All extremities are warm and pink.

## 2024-02-06 LAB — BACTERIA UR CULT: NORMAL

## 2024-02-06 NOTE — PROGRESS NOTES
DATE: 2/8/2024  PATIENT: Chris De La Paz    Supervising Physician: Zain Arriola M.D.    CHIEF COMPLAINT: low back pain    HISTORY:  Chris De La Paz is a 58 y.o. female here for initial evaluation of low back and right anterior leg pain (Back - 6, Leg - 7).  The pain in the right thigh is what bothers her most.  The pain has been present for years but has increased over the past few months. The patient describes the pain as sharp.  The pain is worse with walking and improved by nothing. There is intermittent associated numbness and tingling. There is subjective weakness. Prior treatments have included naproxen, percocet, flexeril, but no EDISON or surgery.  The patient denies myelopathic symptoms such as handwriting changes or difficulty with buttons/coins/keys. Denies perineal paresthesias, bowel/bladder dysfunction.    PAST MEDICAL/SURGICAL HISTORY:  Past Medical History:   Diagnosis Date    Hypertension     Stroke 2017     Past Surgical History:   Procedure Laterality Date    TUBAL LIGATION         Medications:   Current Outpatient Medications on File Prior to Visit   Medication Sig Dispense Refill    acetaminophen (TYLENOL) 500 MG tablet Take 1 tablet (500 mg total) by mouth every 6 (six) hours as needed for Pain (and fever). 30 tablet 0    amLODIPine (NORVASC) 5 MG tablet Take 1 tablet by mouth once daily.      benzocaine-menthoL 6-10 mg lozenge Take 1 lozenge by mouth every 2 (two) hours as needed for Pain (sore throat). 18 tablet 0    cephALEXin (KEFLEX) 500 MG capsule Take 1 capsule (500 mg total) by mouth 4 (four) times daily. for 5 days 20 capsule 0    cetirizine (ZYRTEC) 10 MG tablet Take 1 tablet (10 mg total) by mouth daily as needed for Allergies or Rhinitis. 30 tablet 0    erythromycin (ROMYCIN) ophthalmic ointment Place a 1/2 inch ribbon of ointment into the lower eyelid qid x 5 days 1 g 0    fluticasone propionate (FLONASE) 50 mcg/actuation nasal spray 1 spray (50 mcg total) by Each Nostril route 2  (two) times daily. 16 g 0    fluticasone propionate (FLONASE) 50 mcg/actuation nasal spray 1 spray (50 mcg total) by Each Nostril route 2 (two) times daily. 16 g 0    guaiFENesin 100 mg/5 ml (ROBITUSSIN) 100 mg/5 mL syrup Take 5-10 mLs (100-200 mg total) by mouth every 4 (four) hours as needed for Cough or Congestion. 118 mL 0    hydroCHLOROthiazide (HYDRODIURIL) 50 MG tablet Take 50 mg by mouth once daily.      hydroCHLOROthiazide (HYDRODIURIL) 50 MG tablet Take 1 tablet by mouth once daily.      HYDROcodone-acetaminophen (NORCO) 5-325 mg per tablet Take 1 tablet by mouth every 6 (six) hours as needed for Pain. 3 tablet 0    ibuprofen (ADVIL,MOTRIN) 600 MG tablet Take 1 tablet (600 mg total) by mouth every 6 (six) hours as needed for Pain (Take with food as needed for mild-to-moderate pain). 20 tablet 0    levocetirizine (XYZAL) 5 MG tablet Take 1 tablet (5 mg total) by mouth every evening. 30 tablet 0    LIDOcaine (LIDODERM) 5 % Place 1 patch onto the skin once daily. Remove & Discard patch within 12 hours or as directed by MD 15 patch 0    naproxen (NAPROSYN) 500 MG tablet Take 1 tablet (500 mg total) by mouth 2 (two) times daily with meals. 30 tablet 0    ondansetron (ZOFRAN) 4 MG tablet Take 1 tablet (4 mg total) by mouth every 6 (six) hours. 12 tablet 0    sodium chloride (SALINE NASAL) 0.65 % nasal spray 1 spray by Nasal route as needed for Congestion. 30 mL 0    famotidine (PEPCID) 20 MG tablet Take 1 tablet (20 mg total) by mouth once daily. 5 tablet 0    losartan (COZAAR) 50 MG tablet Take 50 mg by mouth.      rosuvastatin (CRESTOR) 20 MG tablet Take 1 tablet (20 mg total) by mouth every evening. 90 tablet 3     No current facility-administered medications on file prior to visit.       Social History:   Social History     Socioeconomic History    Marital status: Single   Tobacco Use    Smoking status: Every Day     Current packs/day: 0.50     Types: Cigarettes    Smokeless tobacco: Never   Substance and  Sexual Activity    Alcohol use: Yes     Comment: socially    Drug use: Yes     Types: Marijuana     Comment: occ    Sexual activity: Yes     Partners: Male     Birth control/protection: None     Social Determinants of Health     Financial Resource Strain: High Risk (8/13/2021)    Overall Financial Resource Strain (CARDIA)     Difficulty of Paying Living Expenses: Hard   Food Insecurity: Food Insecurity Present (8/13/2021)    Hunger Vital Sign     Worried About Running Out of Food in the Last Year: Sometimes true     Ran Out of Food in the Last Year: Sometimes true   Transportation Needs: No Transportation Needs (8/13/2021)    PRAPARE - Transportation     Lack of Transportation (Medical): No     Lack of Transportation (Non-Medical): No   Physical Activity: Insufficiently Active (8/13/2021)    Exercise Vital Sign     Days of Exercise per Week: 4 days     Minutes of Exercise per Session: 30 min   Stress: No Stress Concern Present (8/13/2021)    [employer] [employer] [employer] - Occupational Stress Questionnaire     Feeling of Stress : Only a little   Social Connections: Unknown (8/13/2021)    Social Connection and Isolation Panel [NHANES]     Frequency of Communication with Friends and Family: More than three times a week     Frequency of Social Gatherings with Friends and Family: Once a week     Attends Club or Organization Meetings: Never     Marital Status: Never    Housing Stability: High Risk (8/13/2021)    Housing Stability Vital Sign     Unable to Pay for Housing in the Last Year: Yes     Number of Places Lived in the Last Year: 1     Unstable Housing in the Last Year: No       REVIEW OF SYSTEMS:  Constitution: Negative. Negative for chills, fever and night sweats.   Cardiovascular: Negative for chest pain and syncope.   Respiratory: Negative for cough and shortness of breath.   Gastrointestinal: See HPI. Negative for nausea/vomiting. Negative for abdominal pain.  Genitourinary: See HPI. Negative  "for discoloration or dysuria.  Skin: Negative for dry skin, itching and rash.   Hematologic/Lymphatic: Negative for bleeding problem. Does not bruise/bleed easily.   Musculoskeletal: Negative for falls and muscle weakness.   Neurological: See HPI. No seizures.   Endocrine: Negative for polydipsia, polyphagia and polyuria.   Allergic/Immunologic: Negative for hives and persistent infections.     EXAM:  Ht 5' 7" (1.702 m)   Wt 69.7 kg (153 lb 10.6 oz)   LMP 06/21/2022   BMI 24.07 kg/m²     General: The patient is a 58 y.o. female in no apparent distress, the patient is oriented to person, place and time.  Psych: Normal mood and affect  HEENT: Vision grossly intact, hearing intact to the spoken word.  Lungs: Respirations unlabored.  Gait: Normal station and gait, no difficulty with toe or heel walk.   Skin: Dorsal lumbar skin negative for rashes, lesions, hairy patches and surgical scars. There is mild lumbar tenderness to palpation.  Range of motion: Lumbar range of motion is acceptable.  Spinal Balance: Global saggital and coronal spinal balance acceptable, not significant for scoliosis and kyphosis.  Musculoskeletal: No pain with the range of motion of the bilateral hips. No trochanteric tenderness to palpation.  Vascular: Bilateral lower extremities warm and well perfused, dorsalis pedis pulses 2+ bilaterally.  Neurological: Normal strength and tone in all major motor groups in the bilateral lower extremities. decreased sensation to light touch in the L2-S1 dermatomes int he RLE.  Deep tendon reflexes symmetric 2+ in the bilateral lower extremities.  Negative Babinski bilaterally. Straight leg raise negative bilaterally.    IMAGING:      Today I personally reviewed AP, Lat and Flex/Ex  upright L-spine films that demonstrate moderate DDD from L3-S1.       Body mass index is 24.07 kg/m².    Hemoglobin A1C   Date Value Ref Range Status   11/05/2017 5.3 4.0 - 5.6 % Final     Comment:     According to ADA guidelines, " hemoglobin A1c <7.0% represents  optimal control in non-pregnant diabetic patients. Different  metrics may apply to specific patient populations.   Standards of Medical Care in Diabetes-2016.  For the purpose of screening for the presence of diabetes:  <5.7%     Consistent with the absence of diabetes  5.7-6.4%  Consistent with increasing risk for diabetes   (prediabetes)  >or=6.5%  Consistent with diabetes  Currently, no consensus exists for use of hemoglobin A1c  for diagnosis of diabetes for children.  This Hemoglobin A1c assay has significant interference with fetal   hemoglobin   (HbF). The results are invalid for patients with abnormal amounts of   HbF,   including those with known Hereditary Persistence   of Fetal Hemoglobin. Heterozygous hemoglobin variants (HbAS, HbAC,   HbAD, HbAE, HbA2) do not significantly interfere with this assay;   however, presence of multiple variants in a sample may impact the %   interference.     05/18/2017 5.7 4.5 - 6.2 % Final     Comment:     According to ADA guidelines, hemoglobin A1C <7.0% represents  optimal control in non-pregnant diabetic patients.  Different  metrics may apply to specific populations.   Standards of Medical Care in Diabetes - 2016.  For the purpose of screening for the presence of diabetes:  <5.7%     Consistent with the absence of diabetes  5.7-6.4%  Consistent with increasing risk for diabetes   (prediabetes)  >or=6.5%  Consistent with diabetes  Currently no consensus exists for use of hemoglobin A1C  for diagnosis of diabetes for children.             ASSESSMENT/PLAN:    Chris was seen today for low-back pain and back pain.    Diagnoses and all orders for this visit:    Dorsalgia, unspecified  -     MRI Lumbar Spine Without Contrast; Future    Lumbar radiculopathy  -     gabapentin (NEURONTIN) 100 MG capsule; Take 1-2 capsules (100-200 mg total) by mouth 3 (three) times daily.  -     MRI Lumbar Spine Without Contrast; Future    DDD (degenerative disc  disease), lumbar  -     gabapentin (NEURONTIN) 100 MG capsule; Take 1-2 capsules (100-200 mg total) by mouth 3 (three) times daily.  -     MRI Lumbar Spine Without Contrast; Future    Other orders  -     methylPREDNISolone (MEDROL DOSEPACK) 4 mg tablet; use as directed      Today we discussed at length all of the different treatment options including anti-inflammatories, acetaminophen, rest, ice, heat, physical therapy including strengthening and stretching exercises, home exercises, ROM, aerobic conditioning, aqua therapy, other modalities including ultrasound, massage, and dry needling, epidural steroid injections and finally surgical intervention.  MRI ordered, she will follow up in the clinic for results.

## 2024-02-07 ENCOUNTER — TELEPHONE (OUTPATIENT)
Dept: ORTHOPEDICS | Facility: CLINIC | Age: 59
End: 2024-02-07
Payer: MEDICAID

## 2024-02-07 DIAGNOSIS — M51.36 DDD (DEGENERATIVE DISC DISEASE), LUMBAR: Primary | ICD-10-CM

## 2024-02-07 NOTE — TELEPHONE ENCOUNTER
Patient is aware of x-ray scheduled for 7:30 at the [facility] on 02/08/2024.  Patient stated thank you. Thanks.

## 2024-02-08 ENCOUNTER — HOSPITAL ENCOUNTER (OUTPATIENT)
Dept: RADIOLOGY | Facility: HOSPITAL | Age: 59
Discharge: HOME | End: 2024-02-08
Attending: REGISTERED NURSE
Payer: COMMERCIAL

## 2024-02-08 ENCOUNTER — OFFICE VISIT (OUTPATIENT)
Dept: ORTHOPEDICS | Facility: CLINIC | Age: 59
End: 2024-02-08
Payer: COMMERCIAL

## 2024-02-08 VITALS — BODY MASS INDEX: 24.12 KG/M2 | WEIGHT: 153.69 LBS | HEIGHT: 67 IN

## 2024-02-08 DIAGNOSIS — M54.9 DORSALGIA, UNSPECIFIED: Primary | ICD-10-CM

## 2024-02-08 DIAGNOSIS — M51.36 DDD (DEGENERATIVE DISC DISEASE), LUMBAR: ICD-10-CM

## 2024-02-08 DIAGNOSIS — M54.41 CHRONIC RIGHT-SIDED LOW BACK PAIN WITH RIGHT-SIDED SCIATICA: ICD-10-CM

## 2024-02-08 DIAGNOSIS — G89.29 CHRONIC RIGHT-SIDED LOW BACK PAIN WITH RIGHT-SIDED SCIATICA: ICD-10-CM

## 2024-02-08 DIAGNOSIS — M54.16 LUMBAR RADICULOPATHY: ICD-10-CM

## 2024-02-08 DIAGNOSIS — G89.29 ACUTE EXACERBATION OF CHRONIC LOW BACK PAIN: ICD-10-CM

## 2024-02-08 DIAGNOSIS — M54.50 LUMBAR BACK PAIN: ICD-10-CM

## 2024-02-08 DIAGNOSIS — M54.50 ACUTE EXACERBATION OF CHRONIC LOW BACK PAIN: ICD-10-CM

## 2024-02-08 PROCEDURE — 3008F BODY MASS INDEX DOCD: CPT | Mod: CPTII,S$GLB,, | Performed by: REGISTERED NURSE

## 2024-02-08 PROCEDURE — 99214 OFFICE O/P EST MOD 30 MIN: CPT | Mod: PBBFAC,25 | Performed by: REGISTERED NURSE

## 2024-02-08 PROCEDURE — 72110 X-RAY EXAM L-2 SPINE 4/>VWS: CPT | Mod: 26,,, | Performed by: RADIOLOGY

## 2024-02-08 PROCEDURE — 99999 PR PBB SHADOW E&M-EST. PATIENT-LVL IV: CPT | Mod: PBBFAC,,, | Performed by: REGISTERED NURSE

## 2024-02-08 PROCEDURE — 99204 OFFICE O/P NEW MOD 45 MIN: CPT | Mod: S$GLB,,, | Performed by: REGISTERED NURSE

## 2024-02-08 PROCEDURE — 1159F MED LIST DOCD IN RCRD: CPT | Mod: CPTII,S$GLB,, | Performed by: REGISTERED NURSE

## 2024-02-08 PROCEDURE — 72110 X-RAY EXAM L-2 SPINE 4/>VWS: CPT | Mod: TC

## 2024-02-08 RX ORDER — METHYLPREDNISOLONE 4 MG/1
TABLET ORAL
Qty: 1 EACH | Refills: 0 | Status: SHIPPED | OUTPATIENT
Start: 2024-02-08 | End: 2024-02-29

## 2024-02-08 RX ORDER — HYDROCHLOROTHIAZIDE 50 MG/1
1 TABLET ORAL DAILY
COMMUNITY
Start: 2023-08-25

## 2024-02-08 RX ORDER — AMLODIPINE BESYLATE 5 MG/1
1 TABLET ORAL DAILY
COMMUNITY
Start: 2023-08-24 | End: 2024-08-23

## 2024-02-08 RX ORDER — GABAPENTIN 100 MG/1
100-200 CAPSULE ORAL 3 TIMES DAILY
Qty: 180 CAPSULE | Refills: 5 | Status: SHIPPED | OUTPATIENT
Start: 2024-02-08 | End: 2024-03-04

## 2024-02-08 NOTE — LETTER
February 8, 2024      [id number]  [address]  [facility] [address]  Phone: [phone]       Patient: Chris De La Paz   YOB: 1965  Date of Visit: 02/08/2024    To Whom It May Concern:    Juan De La Paz  was at [facility] on 02/08/2024. The patient may return to work/school on 2/9/24 with no restrictions. If you have any questions or concerns, or if I can be of further assistance, please do not hesitate to contact me.    Sincerely,        CAROLYNE Denis NP

## 2024-02-12 PROBLEM — I63.9 CEREBROVASCULAR ACCIDENT: Status: RESOLVED | Noted: 2021-05-11 | Resolved: 2024-02-12

## 2024-02-14 NOTE — PROGRESS NOTES
"DATE: 3/4/2024  PATIENT: Chris De La Paz    Attending Physician: Zain Arriola M.D.    HISTORY:  Chris De La Paz is a 58 y.o. female who returns to me today for follow up.  She was last seen by me 2/8/2024.  Today she is doing well but notes continued low back and right leg pain. The patient was scheduled for a MRI, but did not complete it prior to this visit.    The Patient denies myelopathic symptoms such as handwriting changes or difficulty with buttons/coins/keys. Denies perineal paresthesias, bowel/bladder dysfunction.    PMH/PSH/FamHx/SocHx:  Unchanged from prior visit    ROS:  REVIEW OF SYSTEMS:  Constitution: Negative. Negative for chills, fever and night sweats.   HENT: Negative for congestion and headaches.    Eyes: Negative for blurred vision, left vision loss and right vision loss.   Cardiovascular: Negative for chest pain and syncope.   Respiratory: Negative for cough and shortness of breath.    Endocrine: Negative for polydipsia, polyphagia and polyuria.   Hematologic/Lymphatic: Negative for bleeding problem. Does not bruise/bleed easily.   Skin: Negative for dry skin, itching and rash.   Musculoskeletal: Negative for falls and muscle weakness.   Gastrointestinal: Negative for abdominal pain and bowel incontinence.   Allergic/Immunologic: Negative for hives and persistent infections.  Genitourinary: Negative for urinary retention/incontinence and nocturia.   Neurological: negative for disturbances in coordination, no myelopathic symptoms such as handwriting changes or difficulty with buttons, coins, keys or small objects. No loss of balance and seizures.   Psychiatric/Behavioral: Negative for depression. The patient does not have insomnia.   Denies perineal paresthesias, bowel or bladder incontinence    EXAM:  Ht 5' 7" (1.702 m)   Wt 73.2 kg (161 lb 7.8 oz)   LMP 06/21/2022   BMI 25.29 kg/m²   Stable.     IMAGING:    Today I personally re- reviewed AP, Lat and Flex/Ex  upright L-spine that " demonstrate moderate DDD from L3-S1.       Body mass index is 25.29 kg/m².    Hemoglobin A1C   Date Value Ref Range Status   11/05/2017 5.3 4.0 - 5.6 % Final     Comment:     According to ADA guidelines, hemoglobin A1c <7.0% represents  optimal control in non-pregnant diabetic patients. Different  metrics may apply to specific patient populations.   Standards of Medical Care in Diabetes-2016.  For the purpose of screening for the presence of diabetes:  <5.7%     Consistent with the absence of diabetes  5.7-6.4%  Consistent with increasing risk for diabetes   (prediabetes)  >or=6.5%  Consistent with diabetes  Currently, no consensus exists for use of hemoglobin A1c  for diagnosis of diabetes for children.  This Hemoglobin A1c assay has significant interference with fetal   hemoglobin   (HbF). The results are invalid for patients with abnormal amounts of   HbF,   including those with known Hereditary Persistence   of Fetal Hemoglobin. Heterozygous hemoglobin variants (HbAS, HbAC,   HbAD, HbAE, HbA2) do not significantly interfere with this assay;   however, presence of multiple variants in a sample may impact the %   interference.     05/18/2017 5.7 4.5 - 6.2 % Final     Comment:     According to ADA guidelines, hemoglobin A1C <7.0% represents  optimal control in non-pregnant diabetic patients.  Different  metrics may apply to specific populations.   Standards of Medical Care in Diabetes - 2016.  For the purpose of screening for the presence of diabetes:  <5.7%     Consistent with the absence of diabetes  5.7-6.4%  Consistent with increasing risk for diabetes   (prediabetes)  >or=6.5%  Consistent with diabetes  Currently no consensus exists for use of hemoglobin A1C  for diagnosis of diabetes for children.           ASSESSMENT/PLAN:    Chris was seen today for low-back pain and back pain.    Diagnoses and all orders for this visit:    Lumbar radiculopathy  -     gabapentin (NEURONTIN) 300 MG capsule; Take 1 capsule  (300 mg total) by mouth 3 (three) times daily.  -     methylPREDNISolone (MEDROL DOSEPACK) 4 mg tablet; use as directed      MRI rescheduled, she will follow up in the clinic for results.

## 2024-03-04 ENCOUNTER — OFFICE VISIT (OUTPATIENT)
Dept: ORTHOPEDICS | Facility: CLINIC | Age: 59
End: 2024-03-04
Payer: COMMERCIAL

## 2024-03-04 ENCOUNTER — TELEPHONE (OUTPATIENT)
Dept: ORTHOPEDICS | Facility: CLINIC | Age: 59
End: 2024-03-04

## 2024-03-04 VITALS — BODY MASS INDEX: 25.35 KG/M2 | HEIGHT: 67 IN | WEIGHT: 161.5 LBS

## 2024-03-04 DIAGNOSIS — M54.16 LUMBAR RADICULOPATHY: Primary | ICD-10-CM

## 2024-03-04 PROCEDURE — 1159F MED LIST DOCD IN RCRD: CPT | Mod: CPTII,S$GLB,, | Performed by: REGISTERED NURSE

## 2024-03-04 PROCEDURE — 99999 PR PBB SHADOW E&M-EST. PATIENT-LVL III: CPT | Mod: PBBFAC,,, | Performed by: REGISTERED NURSE

## 2024-03-04 PROCEDURE — 3008F BODY MASS INDEX DOCD: CPT | Mod: CPTII,S$GLB,, | Performed by: REGISTERED NURSE

## 2024-03-04 PROCEDURE — 99213 OFFICE O/P EST LOW 20 MIN: CPT | Mod: PBBFAC | Performed by: REGISTERED NURSE

## 2024-03-04 PROCEDURE — 1160F RVW MEDS BY RX/DR IN RCRD: CPT | Mod: CPTII,S$GLB,, | Performed by: REGISTERED NURSE

## 2024-03-04 PROCEDURE — 99213 OFFICE O/P EST LOW 20 MIN: CPT | Mod: S$GLB,,, | Performed by: REGISTERED NURSE

## 2024-03-04 RX ORDER — METHYLPREDNISOLONE 4 MG/1
TABLET ORAL
Qty: 1 EACH | Refills: 0 | Status: SHIPPED | OUTPATIENT
Start: 2024-03-04 | End: 2024-03-25

## 2024-03-04 RX ORDER — GABAPENTIN 300 MG/1
300 CAPSULE ORAL 3 TIMES DAILY
Qty: 90 CAPSULE | Refills: 11 | Status: SHIPPED | OUTPATIENT
Start: 2024-03-04 | End: 2025-03-04

## 2024-03-04 NOTE — TELEPHONE ENCOUNTER
Spoke to patient regarding an appointment for an mri. Stated to patient that she will have to contact her insurance company. Stated that her insurance I out of net work. Patient stated that she will give them  a call. Stated thank you. Thanks